# Patient Record
Sex: FEMALE | Race: OTHER | HISPANIC OR LATINO
[De-identification: names, ages, dates, MRNs, and addresses within clinical notes are randomized per-mention and may not be internally consistent; named-entity substitution may affect disease eponyms.]

---

## 2018-03-13 ENCOUNTER — RESULT REVIEW (OUTPATIENT)
Age: 33
End: 2018-03-13

## 2018-04-04 ENCOUNTER — INPATIENT (INPATIENT)
Facility: HOSPITAL | Age: 33
LOS: 2 days | Discharge: HOME | DRG: 639 | End: 2018-04-07
Attending: INTERNAL MEDICINE | Admitting: INTERNAL MEDICINE
Payer: MEDICAID

## 2018-04-04 VITALS
TEMPERATURE: 98 F | HEIGHT: 69 IN | HEART RATE: 115 BPM | SYSTOLIC BLOOD PRESSURE: 133 MMHG | WEIGHT: 95.02 LBS | RESPIRATION RATE: 16 BRPM | OXYGEN SATURATION: 98 % | DIASTOLIC BLOOD PRESSURE: 93 MMHG

## 2018-04-04 DIAGNOSIS — E10.10 TYPE 1 DIABETES MELLITUS WITH KETOACIDOSIS WITHOUT COMA: ICD-10-CM

## 2018-04-04 DIAGNOSIS — Z98.89 OTHER SPECIFIED POSTPROCEDURAL STATES: Chronic | ICD-10-CM

## 2018-04-04 DIAGNOSIS — Z90.49 ACQUIRED ABSENCE OF OTHER SPECIFIED PARTS OF DIGESTIVE TRACT: Chronic | ICD-10-CM

## 2018-04-04 DIAGNOSIS — Z98.51 TUBAL LIGATION STATUS: Chronic | ICD-10-CM

## 2018-04-04 DIAGNOSIS — Z98.49 CATARACT EXTRACTION STATUS, UNSPECIFIED EYE: Chronic | ICD-10-CM

## 2018-04-04 DIAGNOSIS — J45.909 UNSPECIFIED ASTHMA, UNCOMPLICATED: ICD-10-CM

## 2018-04-04 DIAGNOSIS — G62.9 POLYNEUROPATHY, UNSPECIFIED: ICD-10-CM

## 2018-04-04 DIAGNOSIS — Z29.9 ENCOUNTER FOR PROPHYLACTIC MEASURES, UNSPECIFIED: ICD-10-CM

## 2018-04-04 DIAGNOSIS — K52.9 NONINFECTIVE GASTROENTERITIS AND COLITIS, UNSPECIFIED: ICD-10-CM

## 2018-04-04 LAB
ACETONE SERPL-MCNC: ABNORMAL
ACETONE SERPL-MCNC: ABNORMAL
ALBUMIN SERPL ELPH-MCNC: 2.9 G/DL — LOW (ref 3.5–5)
ALBUMIN SERPL ELPH-MCNC: 4 G/DL — SIGNIFICANT CHANGE UP (ref 3.5–5)
ALP SERPL-CCNC: 109 U/L — SIGNIFICANT CHANGE UP (ref 40–120)
ALP SERPL-CCNC: 80 U/L — SIGNIFICANT CHANGE UP (ref 40–120)
ALT FLD-CCNC: 14 U/L DA — SIGNIFICANT CHANGE UP (ref 10–60)
ALT FLD-CCNC: 16 U/L DA — SIGNIFICANT CHANGE UP (ref 10–60)
ANION GAP SERPL CALC-SCNC: 10 MMOL/L — SIGNIFICANT CHANGE UP (ref 5–17)
ANION GAP SERPL CALC-SCNC: 16 MMOL/L — SIGNIFICANT CHANGE UP (ref 5–17)
ANION GAP SERPL CALC-SCNC: 23 MMOL/L — HIGH (ref 5–17)
APPEARANCE UR: CLEAR — SIGNIFICANT CHANGE UP
AST SERPL-CCNC: 10 U/L — SIGNIFICANT CHANGE UP (ref 10–40)
AST SERPL-CCNC: 12 U/L — SIGNIFICANT CHANGE UP (ref 10–40)
BASE EXCESS BLDV CALC-SCNC: -16.5 MMOL/L — LOW (ref -2–2)
BASOPHILS # BLD AUTO: 0 K/UL — SIGNIFICANT CHANGE UP (ref 0–0.2)
BASOPHILS # BLD AUTO: 0 K/UL — SIGNIFICANT CHANGE UP (ref 0–0.2)
BASOPHILS NFR BLD AUTO: 0.4 % — SIGNIFICANT CHANGE UP (ref 0–2)
BASOPHILS NFR BLD AUTO: 0.4 % — SIGNIFICANT CHANGE UP (ref 0–2)
BILIRUB SERPL-MCNC: 0.3 MG/DL — SIGNIFICANT CHANGE UP (ref 0.2–1.2)
BILIRUB SERPL-MCNC: 0.7 MG/DL — SIGNIFICANT CHANGE UP (ref 0.2–1.2)
BILIRUB UR-MCNC: NEGATIVE — SIGNIFICANT CHANGE UP
BUN SERPL-MCNC: 11 MG/DL — SIGNIFICANT CHANGE UP (ref 7–18)
BUN SERPL-MCNC: 14 MG/DL — SIGNIFICANT CHANGE UP (ref 7–18)
BUN SERPL-MCNC: 6 MG/DL — LOW (ref 7–18)
CALCIUM SERPL-MCNC: 6.1 MG/DL — CRITICAL LOW (ref 8.4–10.5)
CALCIUM SERPL-MCNC: 6.7 MG/DL — LOW (ref 8.4–10.5)
CALCIUM SERPL-MCNC: 9.1 MG/DL — SIGNIFICANT CHANGE UP (ref 8.4–10.5)
CHLORIDE SERPL-SCNC: 115 MMOL/L — HIGH (ref 96–108)
CHLORIDE SERPL-SCNC: 116 MMOL/L — HIGH (ref 96–108)
CHLORIDE SERPL-SCNC: 98 MMOL/L — SIGNIFICANT CHANGE UP (ref 96–108)
CHOLEST SERPL-MCNC: 195 MG/DL — SIGNIFICANT CHANGE UP (ref 10–199)
CO2 SERPL-SCNC: 10 MMOL/L — CRITICAL LOW (ref 22–31)
CO2 SERPL-SCNC: 10 MMOL/L — CRITICAL LOW (ref 22–31)
CO2 SERPL-SCNC: 13 MMOL/L — LOW (ref 22–31)
COLOR SPEC: YELLOW — SIGNIFICANT CHANGE UP
CREAT SERPL-MCNC: 0.7 MG/DL — SIGNIFICANT CHANGE UP (ref 0.5–1.3)
CREAT SERPL-MCNC: 0.74 MG/DL — SIGNIFICANT CHANGE UP (ref 0.5–1.3)
CREAT SERPL-MCNC: 1.17 MG/DL — SIGNIFICANT CHANGE UP (ref 0.5–1.3)
DIFF PNL FLD: NEGATIVE — SIGNIFICANT CHANGE UP
EOSINOPHIL # BLD AUTO: 0 K/UL — SIGNIFICANT CHANGE UP (ref 0–0.5)
EOSINOPHIL # BLD AUTO: 0 K/UL — SIGNIFICANT CHANGE UP (ref 0–0.5)
EOSINOPHIL NFR BLD AUTO: 0.1 % — SIGNIFICANT CHANGE UP (ref 0–6)
EOSINOPHIL NFR BLD AUTO: 0.1 % — SIGNIFICANT CHANGE UP (ref 0–6)
GLUCOSE SERPL-MCNC: 223 MG/DL — HIGH (ref 70–99)
GLUCOSE SERPL-MCNC: 262 MG/DL — HIGH (ref 70–99)
GLUCOSE SERPL-MCNC: 440 MG/DL — HIGH (ref 70–99)
GLUCOSE UR QL: 1000 MG/DL
HBA1C BLD-MCNC: 12.4 % — HIGH (ref 4–5.6)
HCG SERPL-ACNC: <1 MIU/ML — SIGNIFICANT CHANGE UP
HCO3 BLDV-SCNC: 11 MMOL/L — LOW (ref 21–29)
HCT VFR BLD CALC: 34.3 % — LOW (ref 34.5–45)
HCT VFR BLD CALC: 44.6 % — SIGNIFICANT CHANGE UP (ref 34.5–45)
HDLC SERPL-MCNC: 59 MG/DL — SIGNIFICANT CHANGE UP (ref 40–125)
HGB BLD-MCNC: 10.5 G/DL — LOW (ref 11.5–15.5)
HGB BLD-MCNC: 13.4 G/DL — SIGNIFICANT CHANGE UP (ref 11.5–15.5)
HOROWITZ INDEX BLDV+IHG-RTO: 21 — SIGNIFICANT CHANGE UP
KETONES UR-MCNC: ABNORMAL
LACTATE SERPL-SCNC: 1.9 MMOL/L — SIGNIFICANT CHANGE UP (ref 0.7–2)
LEUKOCYTE ESTERASE UR-ACNC: NEGATIVE — SIGNIFICANT CHANGE UP
LIPID PNL WITH DIRECT LDL SERPL: 64 MG/DL — SIGNIFICANT CHANGE UP
LYMPHOCYTES # BLD AUTO: 0.7 K/UL — LOW (ref 1–3.3)
LYMPHOCYTES # BLD AUTO: 1.4 K/UL — SIGNIFICANT CHANGE UP (ref 1–3.3)
LYMPHOCYTES # BLD AUTO: 14 % — SIGNIFICANT CHANGE UP (ref 13–44)
LYMPHOCYTES # BLD AUTO: 7.2 % — LOW (ref 13–44)
MAGNESIUM SERPL-MCNC: 1.4 MG/DL — LOW (ref 1.6–2.6)
MCHC RBC-ENTMCNC: 25.5 PG — LOW (ref 27–34)
MCHC RBC-ENTMCNC: 26.1 PG — LOW (ref 27–34)
MCHC RBC-ENTMCNC: 30.1 GM/DL — LOW (ref 32–36)
MCHC RBC-ENTMCNC: 30.6 GM/DL — LOW (ref 32–36)
MCV RBC AUTO: 84.7 FL — SIGNIFICANT CHANGE UP (ref 80–100)
MCV RBC AUTO: 85.2 FL — SIGNIFICANT CHANGE UP (ref 80–100)
MONOCYTES # BLD AUTO: 0.2 K/UL — SIGNIFICANT CHANGE UP (ref 0–0.9)
MONOCYTES # BLD AUTO: 0.4 K/UL — SIGNIFICANT CHANGE UP (ref 0–0.9)
MONOCYTES NFR BLD AUTO: 2.4 % — SIGNIFICANT CHANGE UP (ref 2–14)
MONOCYTES NFR BLD AUTO: 4.5 % — SIGNIFICANT CHANGE UP (ref 2–14)
NEUTROPHILS # BLD AUTO: 8 K/UL — HIGH (ref 1.8–7.4)
NEUTROPHILS # BLD AUTO: 8.1 K/UL — HIGH (ref 1.8–7.4)
NEUTROPHILS NFR BLD AUTO: 83 % — HIGH (ref 43–77)
NEUTROPHILS NFR BLD AUTO: 87.8 % — HIGH (ref 43–77)
NITRITE UR-MCNC: NEGATIVE — SIGNIFICANT CHANGE UP
OSMOLALITY SERPL: 315 MOS/KG — HIGH (ref 275–300)
PCO2 BLDV: 30 MMHG — LOW (ref 35–50)
PH BLDV: 7.18 — CRITICAL LOW (ref 7.35–7.45)
PH UR: 5 — SIGNIFICANT CHANGE UP (ref 5–8)
PHOSPHATE SERPL-MCNC: 1.5 MG/DL — LOW (ref 2.5–4.5)
PHOSPHATE SERPL-MCNC: 2.2 MG/DL — LOW (ref 2.5–4.5)
PLATELET # BLD AUTO: 348 K/UL — SIGNIFICANT CHANGE UP (ref 150–400)
PLATELET # BLD AUTO: 495 K/UL — HIGH (ref 150–400)
PO2 BLDV: SIGNIFICANT CHANGE UP MMHG (ref 25–45)
POTASSIUM SERPL-MCNC: 3.6 MMOL/L — SIGNIFICANT CHANGE UP (ref 3.5–5.3)
POTASSIUM SERPL-MCNC: 4.5 MMOL/L — SIGNIFICANT CHANGE UP (ref 3.5–5.3)
POTASSIUM SERPL-MCNC: 4.6 MMOL/L — SIGNIFICANT CHANGE UP (ref 3.5–5.3)
POTASSIUM SERPL-SCNC: 3.6 MMOL/L — SIGNIFICANT CHANGE UP (ref 3.5–5.3)
POTASSIUM SERPL-SCNC: 4.5 MMOL/L — SIGNIFICANT CHANGE UP (ref 3.5–5.3)
POTASSIUM SERPL-SCNC: 4.6 MMOL/L — SIGNIFICANT CHANGE UP (ref 3.5–5.3)
PROT SERPL-MCNC: 6.7 G/DL — SIGNIFICANT CHANGE UP (ref 6–8.3)
PROT SERPL-MCNC: 9.3 G/DL — HIGH (ref 6–8.3)
PROT UR-MCNC: 30 MG/DL
RBC # BLD: 4.02 M/UL — SIGNIFICANT CHANGE UP (ref 3.8–5.2)
RBC # BLD: 5.26 M/UL — HIGH (ref 3.8–5.2)
RBC # FLD: 13.8 % — SIGNIFICANT CHANGE UP (ref 10.3–14.5)
RBC # FLD: 13.9 % — SIGNIFICANT CHANGE UP (ref 10.3–14.5)
SAO2 % BLDV: 55 % — LOW (ref 67–88)
SODIUM SERPL-SCNC: 131 MMOL/L — LOW (ref 135–145)
SODIUM SERPL-SCNC: 139 MMOL/L — SIGNIFICANT CHANGE UP (ref 135–145)
SODIUM SERPL-SCNC: 141 MMOL/L — SIGNIFICANT CHANGE UP (ref 135–145)
SP GR SPEC: 1.02 — SIGNIFICANT CHANGE UP (ref 1.01–1.02)
TOTAL CHOLESTEROL/HDL RATIO MEASUREMENT: 3.3 RATIO — SIGNIFICANT CHANGE UP (ref 3.3–7.1)
TRIGL SERPL-MCNC: 358 MG/DL — HIGH (ref 10–149)
TROPONIN I SERPL-MCNC: <0.015 NG/ML — SIGNIFICANT CHANGE UP (ref 0–0.04)
TSH SERPL-MCNC: 2.01 UU/ML — SIGNIFICANT CHANGE UP (ref 0.34–4.82)
UROBILINOGEN FLD QL: NEGATIVE — SIGNIFICANT CHANGE UP
WBC # BLD: 9.2 K/UL — SIGNIFICANT CHANGE UP (ref 3.8–10.5)
WBC # BLD: 9.7 K/UL — SIGNIFICANT CHANGE UP (ref 3.8–10.5)
WBC # FLD AUTO: 9.2 K/UL — SIGNIFICANT CHANGE UP (ref 3.8–10.5)
WBC # FLD AUTO: 9.7 K/UL — SIGNIFICANT CHANGE UP (ref 3.8–10.5)

## 2018-04-04 PROCEDURE — 99285 EMERGENCY DEPT VISIT HI MDM: CPT

## 2018-04-04 PROCEDURE — 71045 X-RAY EXAM CHEST 1 VIEW: CPT | Mod: 26

## 2018-04-04 RX ORDER — POTASSIUM CHLORIDE 20 MEQ
10 PACKET (EA) ORAL
Qty: 0 | Refills: 0 | Status: COMPLETED | OUTPATIENT
Start: 2018-04-04 | End: 2018-04-05

## 2018-04-04 RX ORDER — CHLORHEXIDINE GLUCONATE 213 G/1000ML
1 SOLUTION TOPICAL
Qty: 0 | Refills: 0 | Status: DISCONTINUED | OUTPATIENT
Start: 2018-04-04 | End: 2018-04-05

## 2018-04-04 RX ORDER — GABAPENTIN 400 MG/1
1 CAPSULE ORAL
Qty: 0 | Refills: 0 | COMMUNITY

## 2018-04-04 RX ORDER — INSULIN HUMAN 100 [IU]/ML
4 INJECTION, SOLUTION SUBCUTANEOUS
Qty: 50 | Refills: 0 | Status: DISCONTINUED | OUTPATIENT
Start: 2018-04-04 | End: 2018-04-04

## 2018-04-04 RX ORDER — INSULIN HUMAN 100 [IU]/ML
4 INJECTION, SOLUTION SUBCUTANEOUS
Qty: 100 | Refills: 0 | Status: DISCONTINUED | OUTPATIENT
Start: 2018-04-04 | End: 2018-04-05

## 2018-04-04 RX ORDER — ACETAMINOPHEN 500 MG
650 TABLET ORAL EVERY 6 HOURS
Qty: 0 | Refills: 0 | Status: DISCONTINUED | OUTPATIENT
Start: 2018-04-04 | End: 2018-04-07

## 2018-04-04 RX ORDER — PANTOPRAZOLE SODIUM 20 MG/1
40 TABLET, DELAYED RELEASE ORAL DAILY
Qty: 0 | Refills: 0 | Status: DISCONTINUED | OUTPATIENT
Start: 2018-04-04 | End: 2018-04-05

## 2018-04-04 RX ORDER — SODIUM CHLORIDE 9 MG/ML
1000 INJECTION INTRAMUSCULAR; INTRAVENOUS; SUBCUTANEOUS
Qty: 0 | Refills: 0 | Status: DISCONTINUED | OUTPATIENT
Start: 2018-04-04 | End: 2018-04-04

## 2018-04-04 RX ORDER — ATORVASTATIN CALCIUM 80 MG/1
1 TABLET, FILM COATED ORAL
Qty: 0 | Refills: 0 | COMMUNITY

## 2018-04-04 RX ORDER — SODIUM CHLORIDE 9 MG/ML
1000 INJECTION, SOLUTION INTRAVENOUS
Qty: 0 | Refills: 0 | Status: DISCONTINUED | OUTPATIENT
Start: 2018-04-04 | End: 2018-04-05

## 2018-04-04 RX ORDER — SODIUM CHLORIDE 9 MG/ML
1000 INJECTION INTRAMUSCULAR; INTRAVENOUS; SUBCUTANEOUS ONCE
Qty: 0 | Refills: 0 | Status: COMPLETED | OUTPATIENT
Start: 2018-04-04 | End: 2018-04-04

## 2018-04-04 RX ORDER — ONDANSETRON 8 MG/1
4 TABLET, FILM COATED ORAL ONCE
Qty: 0 | Refills: 0 | Status: COMPLETED | OUTPATIENT
Start: 2018-04-04 | End: 2018-04-04

## 2018-04-04 RX ORDER — ONDANSETRON 8 MG/1
4 TABLET, FILM COATED ORAL EVERY 8 HOURS
Qty: 0 | Refills: 0 | Status: DISCONTINUED | OUTPATIENT
Start: 2018-04-04 | End: 2018-04-07

## 2018-04-04 RX ORDER — POTASSIUM PHOSPHATE, MONOBASIC POTASSIUM PHOSPHATE, DIBASIC 236; 224 MG/ML; MG/ML
30 INJECTION, SOLUTION INTRAVENOUS ONCE
Qty: 0 | Refills: 0 | Status: COMPLETED | OUTPATIENT
Start: 2018-04-04 | End: 2018-04-04

## 2018-04-04 RX ORDER — CALCIUM GLUCONATE 100 MG/ML
2 VIAL (ML) INTRAVENOUS ONCE
Qty: 0 | Refills: 0 | Status: COMPLETED | OUTPATIENT
Start: 2018-04-04 | End: 2018-04-04

## 2018-04-04 RX ORDER — INSULIN HUMAN 100 [IU]/ML
4 INJECTION, SOLUTION SUBCUTANEOUS ONCE
Qty: 0 | Refills: 0 | Status: COMPLETED | OUTPATIENT
Start: 2018-04-04 | End: 2018-04-04

## 2018-04-04 RX ORDER — ENOXAPARIN SODIUM 100 MG/ML
40 INJECTION SUBCUTANEOUS DAILY
Qty: 0 | Refills: 0 | Status: DISCONTINUED | OUTPATIENT
Start: 2018-04-04 | End: 2018-04-07

## 2018-04-04 RX ADMIN — CHLORHEXIDINE GLUCONATE 1 APPLICATION(S): 213 SOLUTION TOPICAL at 17:48

## 2018-04-04 RX ADMIN — Medication 200 GRAM(S): at 19:00

## 2018-04-04 RX ADMIN — SODIUM CHLORIDE 4000 MILLILITER(S): 9 INJECTION INTRAMUSCULAR; INTRAVENOUS; SUBCUTANEOUS at 12:32

## 2018-04-04 RX ADMIN — INSULIN HUMAN 4 UNIT(S)/HR: 100 INJECTION, SOLUTION SUBCUTANEOUS at 18:22

## 2018-04-04 RX ADMIN — PANTOPRAZOLE SODIUM 40 MILLIGRAM(S): 20 TABLET, DELAYED RELEASE ORAL at 20:58

## 2018-04-04 RX ADMIN — INSULIN HUMAN 4 UNIT(S)/HR: 100 INJECTION, SOLUTION SUBCUTANEOUS at 23:08

## 2018-04-04 RX ADMIN — INSULIN HUMAN 4 UNIT(S): 100 INJECTION, SOLUTION SUBCUTANEOUS at 14:13

## 2018-04-04 RX ADMIN — SODIUM CHLORIDE 200 MILLILITER(S): 9 INJECTION INTRAMUSCULAR; INTRAVENOUS; SUBCUTANEOUS at 14:49

## 2018-04-04 RX ADMIN — ENOXAPARIN SODIUM 40 MILLIGRAM(S): 100 INJECTION SUBCUTANEOUS at 20:58

## 2018-04-04 RX ADMIN — SODIUM CHLORIDE 4000 MILLILITER(S): 9 INJECTION INTRAMUSCULAR; INTRAVENOUS; SUBCUTANEOUS at 12:59

## 2018-04-04 RX ADMIN — SODIUM CHLORIDE 4000 MILLILITER(S): 9 INJECTION INTRAMUSCULAR; INTRAVENOUS; SUBCUTANEOUS at 17:24

## 2018-04-04 RX ADMIN — INSULIN HUMAN 4 UNIT(S)/HR: 100 INJECTION, SOLUTION SUBCUTANEOUS at 14:41

## 2018-04-04 RX ADMIN — Medication 100 MILLIEQUIVALENT(S): at 23:40

## 2018-04-04 RX ADMIN — SODIUM CHLORIDE 200 MILLILITER(S): 9 INJECTION, SOLUTION INTRAVENOUS at 17:47

## 2018-04-04 RX ADMIN — ONDANSETRON 4 MILLIGRAM(S): 8 TABLET, FILM COATED ORAL at 14:13

## 2018-04-04 RX ADMIN — POTASSIUM PHOSPHATE, MONOBASIC POTASSIUM PHOSPHATE, DIBASIC 85 MILLIMOLE(S): 236; 224 INJECTION, SOLUTION INTRAVENOUS at 22:41

## 2018-04-04 RX ADMIN — SODIUM CHLORIDE 150 MILLILITER(S): 9 INJECTION INTRAMUSCULAR; INTRAVENOUS; SUBCUTANEOUS at 12:59

## 2018-04-04 NOTE — H&P ADULT - PROBLEM SELECTOR PLAN 1
pt was admitted due to DKA AG 23    pt reported 3 days of N/V and diarrhea   pt takes Tresiba 90U daily and HSS at home, she reported not taking medications over the past 2 days and tool 25U of short acting insulin  pt was started on insulin drip at 4 cc/hr  will transfer pt to the ICU pt was admitted due to DKA AG 23    pt reported 3 days of N/V and diarrhea   pt takes Tresiba 90U daily and HSS at home, she reported not taking medications over the past 2 days and tool 25U of short acting insulin  pt was started on insulin drip at 4 cc/hr  will transfer pt to the ICU  monitor labs q/4 hrs  f/u HbA1C level

## 2018-04-04 NOTE — ED PROVIDER NOTE - PMH
Asthma, unspecified asthma severity, unspecified whether complicated, unspecified whether persistent    Depression, unspecified depression type    Diabetes mellitus type 1    GERD (gastroesophageal reflux disease)    HSV-1 infection    HSV-2 infection    Insulin dependent diabetes mellitus    Neuropathy

## 2018-04-04 NOTE — ED PROVIDER NOTE - PROGRESS NOTE DETAILS
Pt with evidence of DKA.  IVF in progress and regular insulin bolus and infusion ordered.    Dr. Mcmullen accepts Pt for ICU admission and he will inform ICU resident.

## 2018-04-04 NOTE — H&P ADULT - NEGATIVE MUSCULOSKELETAL SYMPTOMS
no arthralgia/no muscle weakness/no arthritis/no neck pain/no myalgia/no stiffness/no muscle cramps/no joint swelling

## 2018-04-04 NOTE — H&P ADULT - NEGATIVE SKIN SYMPTOMS
no itching/no brittle nails/no dryness/no change in size/color of mole/no tumor/no pitted nails/no rash/no hair loss

## 2018-04-04 NOTE — H&P ADULT - NEGATIVE ENMT SYMPTOMS
no hearing difficulty/no nasal congestion/no nasal obstruction/no ear pain/no tinnitus/no post-nasal discharge/no nose bleeds/no vertigo/no sinus symptoms/no recurrent cold sores/no nasal discharge/no abnormal taste sensation

## 2018-04-04 NOTE — ED PROVIDER NOTE - OBJECTIVE STATEMENT
31yo female from home, lives with 2 children, PMH Type I DM (x 14 years), asthma, HLD, IBS, gastritis, depression, s/p , tubal ligation, cholecystectomy, bilateral cataracts, BIBA for 3 days of vomiting, diarrhea, abdominal pain.  Patient is somnolent but able to provide some history; additional medical history obtained from her PMD, Dr Salvatore Louis, [phone].  Endorses 3 days of chills, NBNB vomiting, nonbloody diarrhea and generalized abdominal pain x 3 days.  Patient usually takes long acting insulin Tresiba 90U daily and sliding scale humalog but has not taken Tresiba x past 2 days.  Patient saw PMD yesterday, who told her to come to ED.  Currently, also endorses chest pain and SOB. Denies fever, headache, change in vision, sore throat, cough, dysuria, hematuria, back pain, vaginal discharge.  Last sexual encounter unprotected 3 days ago, smoked marijuana 3 days ago. 33yo female from home, lives with 2 children, PMH Type I DM (x 14 years), asthma, HLD, IBS, gastritis, depression, s/p , tubal ligation, cholecystectomy, bilateral cataracts, BIBA for 3 days of vomiting, diarrhea, abdominal pain.  Patient is somnolent but able to provide some history; additional medical history obtained from her PMD, Dr Salvatore Louis, [phone].  Endorses 3 days of chills, NBNB vomiting, nonbloody diarrhea and generalized abdominal pain x 3 days.  Patient usually takes long acting insulin Tresiba 90U daily and sliding scale humalog but has not taken Tresiba x past 2 days, states took 25U short acting insulin yesterday.  Patient saw PMD yesterday, who told her to come to ED.  Currently, also endorses chest pain and SOB. Denies fever, headache, change in vision, sore throat, cough, dysuria, hematuria, back pain, vaginal discharge.  Last sexual encounter unprotected 3 days ago, smoked marijuana 3 days ago. 33yo female from home, lives with 2 children, PMH Type I DM (x 14 years), asthma, HLD, IBS, gastritis, depression, s/p , tubal ligation, cholecystectomy, bilateral cataracts, BIBA for 3 days of vomiting, diarrhea, abdominal pain.  Patient is somnolent but able to provide some history; additional medical history obtained from her PMD, Dr Salvatore Louis, [phone].  Endorses 3 days of chills, NBNB vomiting, nonbloody diarrhea and generalized abdominal pain x 3 days.  Patient usually takes long acting insulin Tresiba 90U daily and sliding scale humalog but has not taken Tresiba x past 2 days, states took 25U short acting insulin yesterday.  Patient saw PMD yesterday, who told her to come to ED.  Currently, also endorses chest pain and SOB. Denies fever, headache, change in vision, sore throat, cough, dysuria, hematuria, back pain, vaginal discharge. Denies sick contacts at home.  Last sexual encounter unprotected 3 days ago, smoked marijuana 3 days ago.

## 2018-04-04 NOTE — H&P ADULT - RS GEN PE MLT RESP DETAILS PC
clear to auscultation bilaterally/no chest wall tenderness/no rhonchi/no wheezes/no rales/respirations non-labored/good air movement/no subcutaneous emphysema/normal/breath sounds equal/airway patent/no intercostal retractions

## 2018-04-04 NOTE — ED PROVIDER NOTE - PSH
H/O colonoscopy    H/O tubal ligation    History of     Insulin pump in place    S/P cataract surgery    S/P cholecystectomy    S/P endoscopy

## 2018-04-04 NOTE — H&P ADULT - ASSESSMENT
31 yo female from home lives with 2 children. PMH Type 1 DM (for the past 14 years), asthma, HLD, IBS, gastritis, depression, s/p , tubal ligation, cholecystectomy, acne (on abx) and bilateral cataracts. Pt was  BIBA for 3 days of vomiting, diarrhea, abdominal pain.  Patient is somnolent but able to provide some history; additional medical history obtained from her PMD, Dr Salvatore Louis, [phone].  Endorses 3 days of chills, NBNB vomiting, nonbloody diarrhea and generalized abdominal pain x 3 days.  Patient usually takes long acting insulin Tresiba 90U daily and sliding scale Humalog but has not taken Tresiba x past 2 days, states took 25U short acting insulin yesterday.  Patient saw PMD yesterday, who told her to come to ED.  Currently, also endorses chest pain and SOB.

## 2018-04-04 NOTE — H&P ADULT - NEGATIVE CARDIOVASCULAR SYMPTOMS
no palpitations/no dyspnea on exertion/no peripheral edema/no orthopnea/no paroxysmal nocturnal dyspnea/no claudication/no chest pain

## 2018-04-04 NOTE — H&P ADULT - GASTROINTESTINAL DETAILS
no rigidity/no distention/no masses palpable/bowel sounds normal/no bruit/no rebound tenderness/soft/nontender/no guarding/no organomegaly/normal

## 2018-04-04 NOTE — ED ADULT NURSE NOTE - OBJECTIVE STATEMENT
pt is a 31 y/o female with c/o abdominal pain with N/V /Diarrhea x 2 days . pt abdomen soft non distended + BS noted. feeling nauseaus .

## 2018-04-04 NOTE — ED PROVIDER NOTE - MEDICAL DECISION MAKING DETAILS
Pt with DM type 1, vomiting and diarrhea without fever, FS BG over 400 on arrival--will check labs, UA, CXR, EKG, give IVF, concern for DKA.

## 2018-04-04 NOTE — ED ADULT TRIAGE NOTE - CHIEF COMPLAINT QUOTE
as per the pt " I had meat and salmon started getting diarrhoea  vomiting for 2 days " pt had h/o DM . LMP 4/21

## 2018-04-04 NOTE — H&P ADULT - NEGATIVE NEUROLOGICAL SYMPTOMS
no weakness/no paresthesias/no vertigo/no loss of sensation/no headache/no hemiparesis/no difficulty walking/no focal seizures/no syncope/no loss of consciousness/no confusion/no generalized seizures/no transient paralysis/no facial palsy/no tremors

## 2018-04-04 NOTE — H&P ADULT - ATTENDING COMMENTS
32 year old female with DM1, asthma, IBS, H/o Gastritis, depresion who p/w abd pain, n/v. noted to be in DKA. she has not been taking insulin for past couple days due to feeling ill.  no s&s of infection noted.   + social issues  and stresses at this time.    Assessment  DKA  dm 1 with neuropathy  complex social issues    Plan  Admit to ICU  IVF  Insulin drip  check a1c  endo eval  monitor and supplement electrolyte

## 2018-04-04 NOTE — H&P ADULT - NEUROLOGICAL DETAILS
superficial reflexes intact/alert and oriented x 3/responds to verbal commands/responds to pain/sensation intact/deep reflexes intact/cranial nerves intact/no spontaneous movement

## 2018-04-04 NOTE — H&P ADULT - HISTORY OF PRESENT ILLNESS
33 yo female from home lives with 2 children. PMH Type 1 DM (for the past 14 years), asthma, HLD, IBS, gastritis, depression, s/p , tubal ligation, cholecystectomy, acne (on abx) and bilateral cataracts. Pt was  BIBA for 3 days of vomiting, diarrhea, abdominal pain.  Patient is somnolent but able to provide some history; additional medical history obtained from her PMD, Dr Salvatore Louis, [phone].  Endorses 3 days of chills, NBNB vomiting, nonbloody diarrhea and generalized abdominal pain x 3 days.  Patient usually takes long acting insulin Tresiba 90U daily and sliding scale Humalog but has not taken Tresiba x past 2 days, states took 25U short acting insulin yesterday.  Patient saw PMD yesterday, who told her to come to ED.  Currently, also endorses chest pain and SOB.     Denies fever, headache, change in vision, sore throat, cough, dysuria, hematuria, back pain, vaginal discharge. Denies sick contacts at home.  Last sexual encounter unprotected 3 days ago, smoked marijuana 3 days ago. 33 yo female from home lives with 2 children. PMH Type 1 DM (for the past 14 years), asthma, HLD, IBS, gastritis, depression, s/p , tubal ligation, cholecystectomy, acne (on abx) and bilateral cataracts. Pt was BIBA for 3 days of vomiting, diarrhea, abdominal pain.  Patient is somnolent but able to provide some history; additional medical history obtained from her PMD, Dr Salvatore Louis, [phone].  Endorses 3 days of chills, NBNB vomiting, nonbloody diarrhea and generalized abdominal pain x 3 days. Patient usually takes long acting insulin Tresiba 90U daily and sliding scale Humalog but has not taken Tresiba x past 2 days, states took 25U short acting insulin yesterday. Patient saw PMD yesterday, who told her to come to ED. Currently, also endorses chest pain from vomiting and SOB.     Denies fever, headache, change in vision, sore throat, cough, dysuria, hematuria, back pain, vaginal discharge. Denies sick contacts at home.  Last sexual encounter unprotected 3 days ago, smoked marijuana 3 days ago. 31 yo female from home lives with 2 children. PMH Type 1 DM (for the past 14 years), asthma, HLD, IBS, gastritis, depression, s/p , tubal ligation, cholecystectomy, acne (on abx) and bilateral cataracts. Pt was BIBA for 3 days of vomiting, diarrhea, abdominal pain.  Patient is somnolent but able to provide some history; additional medical history obtained from her PMD Dr. Louis. Endorses 3 days of chills, NBNB vomiting, nonbloody diarrhea and generalized abdominal pain x 3 days. Patient usually takes long acting insulin Tresiba 90U daily and sliding scale Humalog but has not taken Tresiba x past 2 days, states took 25U short acting insulin yesterday. Patient saw PMD yesterday, who told her to come to ED. Currently, also endorses chest pain from vomiting and SOB.     Denies fever, headache, change in vision, sore throat, cough, dysuria, hematuria, back pain, vaginal discharge. Denies sick contacts at home.  Last sexual encounter unprotected 3 days ago, smoked marijuana 3 days ago.

## 2018-04-04 NOTE — ED ADULT NURSE NOTE - PMH
Diabetes mellitus type 1    GERD (gastroesophageal reflux disease)    HSV-1 infection    HSV-2 infection    Insulin dependent diabetes mellitus    Neuropathy

## 2018-04-04 NOTE — H&P ADULT - PROBLEM SELECTOR PLAN 2
pt presented with nausea, vomiting, diarrhea and chills over the past 3 days  No WBC or fever   c/w IVF   c/w Zofran PRN   f/u stool analysis

## 2018-04-04 NOTE — H&P ADULT - NEGATIVE GASTROINTESTINAL SYMPTOMS
no jaundice/no change in bowel habits/no flatulence/no constipation/no steatorrhea/no melena/no hematochezia/no hiccoughs

## 2018-04-04 NOTE — ED PROVIDER NOTE - ATTENDING CONTRIBUTION TO CARE
Pt with h/o DM type 1 on insulin, c/o vomiting, diarrhea, abdominal discomfort, found to be in DKA, normal mental status on my exam--will admit to ICU on insulin drip.

## 2018-04-04 NOTE — ED PROVIDER NOTE - CARE PLAN
Principal Discharge DX:	Diabetic ketoacidosis without coma associated with type 1 diabetes mellitus  Secondary Diagnosis:	Gastroenteritis

## 2018-04-05 LAB
ACETONE SERPL-MCNC: NEGATIVE — SIGNIFICANT CHANGE UP
ANION GAP SERPL CALC-SCNC: 10 MMOL/L — SIGNIFICANT CHANGE UP (ref 5–17)
ANION GAP SERPL CALC-SCNC: 8 MMOL/L — SIGNIFICANT CHANGE UP (ref 5–17)
ANION GAP SERPL CALC-SCNC: 8 MMOL/L — SIGNIFICANT CHANGE UP (ref 5–17)
ANION GAP SERPL CALC-SCNC: 9 MMOL/L — SIGNIFICANT CHANGE UP (ref 5–17)
B-OH-BUTYR SERPL-SCNC: 9 MMOL/L — HIGH
BASE EXCESS BLDV CALC-SCNC: -11 MMOL/L — LOW (ref -2–2)
BASE EXCESS BLDV CALC-SCNC: -11 MMOL/L — LOW (ref -2–2)
BASE EXCESS BLDV CALC-SCNC: -12.4 MMOL/L — LOW (ref -2–2)
BLOOD GAS COMMENTS, VENOUS: SIGNIFICANT CHANGE UP
BLOOD GAS COMMENTS, VENOUS: SIGNIFICANT CHANGE UP
BUN SERPL-MCNC: 2 MG/DL — LOW (ref 7–18)
BUN SERPL-MCNC: 2 MG/DL — LOW (ref 7–18)
BUN SERPL-MCNC: 3 MG/DL — LOW (ref 7–18)
BUN SERPL-MCNC: 4 MG/DL — LOW (ref 7–18)
CALCIUM SERPL-MCNC: 6.4 MG/DL — CRITICAL LOW (ref 8.4–10.5)
CALCIUM SERPL-MCNC: 6.5 MG/DL — CRITICAL LOW (ref 8.4–10.5)
CALCIUM SERPL-MCNC: 6.7 MG/DL — LOW (ref 8.4–10.5)
CALCIUM SERPL-MCNC: 7.2 MG/DL — LOW (ref 8.4–10.5)
CHLORIDE SERPL-SCNC: 115 MMOL/L — HIGH (ref 96–108)
CHLORIDE SERPL-SCNC: 116 MMOL/L — HIGH (ref 96–108)
CHLORIDE SERPL-SCNC: 118 MMOL/L — HIGH (ref 96–108)
CHLORIDE SERPL-SCNC: 119 MMOL/L — HIGH (ref 96–108)
CO2 SERPL-SCNC: 14 MMOL/L — LOW (ref 22–31)
CO2 SERPL-SCNC: 14 MMOL/L — LOW (ref 22–31)
CO2 SERPL-SCNC: 16 MMOL/L — LOW (ref 22–31)
CO2 SERPL-SCNC: 17 MMOL/L — LOW (ref 22–31)
CREAT SERPL-MCNC: 0.51 MG/DL — SIGNIFICANT CHANGE UP (ref 0.5–1.3)
CREAT SERPL-MCNC: 0.56 MG/DL — SIGNIFICANT CHANGE UP (ref 0.5–1.3)
CREAT SERPL-MCNC: 0.63 MG/DL — SIGNIFICANT CHANGE UP (ref 0.5–1.3)
CREAT SERPL-MCNC: 0.74 MG/DL — SIGNIFICANT CHANGE UP (ref 0.5–1.3)
GLUCOSE SERPL-MCNC: 111 MG/DL — HIGH (ref 70–99)
GLUCOSE SERPL-MCNC: 157 MG/DL — HIGH (ref 70–99)
GLUCOSE SERPL-MCNC: 162 MG/DL — HIGH (ref 70–99)
GLUCOSE SERPL-MCNC: 221 MG/DL — HIGH (ref 70–99)
HCO3 BLDV-SCNC: 13 MMOL/L — LOW (ref 21–29)
HCO3 BLDV-SCNC: 14 MMOL/L — LOW (ref 21–29)
HCO3 BLDV-SCNC: 15 MMOL/L — LOW (ref 21–29)
HCT VFR BLD CALC: 29.8 % — LOW (ref 34.5–45)
HGB BLD-MCNC: 9.1 G/DL — LOW (ref 11.5–15.5)
HOROWITZ INDEX BLDV+IHG-RTO: 21 — SIGNIFICANT CHANGE UP
MAGNESIUM SERPL-MCNC: 1.3 MG/DL — LOW (ref 1.6–2.6)
MAGNESIUM SERPL-MCNC: 1.9 MG/DL — SIGNIFICANT CHANGE UP (ref 1.6–2.6)
MAGNESIUM SERPL-MCNC: 2.3 MG/DL — SIGNIFICANT CHANGE UP (ref 1.6–2.6)
MCHC RBC-ENTMCNC: 25.6 PG — LOW (ref 27–34)
MCHC RBC-ENTMCNC: 30.5 GM/DL — LOW (ref 32–36)
MCV RBC AUTO: 84 FL — SIGNIFICANT CHANGE UP (ref 80–100)
PCO2 BLDV: 29 MMHG — LOW (ref 35–50)
PCO2 BLDV: 32 MMHG — LOW (ref 35–50)
PCO2 BLDV: 33 MMHG — LOW (ref 35–50)
PH BLDV: 7.27 — LOW (ref 7.35–7.45)
PH BLDV: 7.28 — LOW (ref 7.35–7.45)
PH BLDV: 7.28 — LOW (ref 7.35–7.45)
PHOSPHATE SERPL-MCNC: 1.7 MG/DL — LOW (ref 2.5–4.5)
PHOSPHATE SERPL-MCNC: 2.3 MG/DL — LOW (ref 2.5–4.5)
PHOSPHATE SERPL-MCNC: 3.1 MG/DL — SIGNIFICANT CHANGE UP (ref 2.5–4.5)
PLATELET # BLD AUTO: 312 K/UL — SIGNIFICANT CHANGE UP (ref 150–400)
PO2 BLDV: 52 MMHG — HIGH (ref 25–45)
PO2 BLDV: 57 MMHG — HIGH (ref 25–45)
PO2 BLDV: <43 MMHG — SIGNIFICANT CHANGE UP (ref 25–45)
POTASSIUM SERPL-MCNC: 3.5 MMOL/L — SIGNIFICANT CHANGE UP (ref 3.5–5.3)
POTASSIUM SERPL-MCNC: 3.6 MMOL/L — SIGNIFICANT CHANGE UP (ref 3.5–5.3)
POTASSIUM SERPL-MCNC: 3.8 MMOL/L — SIGNIFICANT CHANGE UP (ref 3.5–5.3)
POTASSIUM SERPL-MCNC: 4.1 MMOL/L — SIGNIFICANT CHANGE UP (ref 3.5–5.3)
POTASSIUM SERPL-SCNC: 3.5 MMOL/L — SIGNIFICANT CHANGE UP (ref 3.5–5.3)
POTASSIUM SERPL-SCNC: 3.6 MMOL/L — SIGNIFICANT CHANGE UP (ref 3.5–5.3)
POTASSIUM SERPL-SCNC: 3.8 MMOL/L — SIGNIFICANT CHANGE UP (ref 3.5–5.3)
POTASSIUM SERPL-SCNC: 4.1 MMOL/L — SIGNIFICANT CHANGE UP (ref 3.5–5.3)
RBC # BLD: 3.55 M/UL — LOW (ref 3.8–5.2)
RBC # FLD: 13.9 % — SIGNIFICANT CHANGE UP (ref 10.3–14.5)
SAO2 % BLDV: 65 % — LOW (ref 67–88)
SAO2 % BLDV: 85 % — SIGNIFICANT CHANGE UP (ref 67–88)
SAO2 % BLDV: 89 % — HIGH (ref 67–88)
SODIUM SERPL-SCNC: 140 MMOL/L — SIGNIFICANT CHANGE UP (ref 135–145)
SODIUM SERPL-SCNC: 141 MMOL/L — SIGNIFICANT CHANGE UP (ref 135–145)
SODIUM SERPL-SCNC: 141 MMOL/L — SIGNIFICANT CHANGE UP (ref 135–145)
SODIUM SERPL-SCNC: 142 MMOL/L — SIGNIFICANT CHANGE UP (ref 135–145)
WBC # BLD: 11.6 K/UL — HIGH (ref 3.8–10.5)
WBC # FLD AUTO: 11.6 K/UL — HIGH (ref 3.8–10.5)

## 2018-04-05 RX ORDER — GABAPENTIN 400 MG/1
100 CAPSULE ORAL THREE TIMES A DAY
Qty: 0 | Refills: 0 | Status: DISCONTINUED | OUTPATIENT
Start: 2018-04-05 | End: 2018-04-07

## 2018-04-05 RX ORDER — SODIUM CHLORIDE 9 MG/ML
1000 INJECTION INTRAMUSCULAR; INTRAVENOUS; SUBCUTANEOUS
Qty: 0 | Refills: 0 | Status: DISCONTINUED | OUTPATIENT
Start: 2018-04-05 | End: 2018-04-05

## 2018-04-05 RX ORDER — INSULIN GLARGINE 100 [IU]/ML
30 INJECTION, SOLUTION SUBCUTANEOUS AT BEDTIME
Qty: 0 | Refills: 0 | Status: DISCONTINUED | OUTPATIENT
Start: 2018-04-06 | End: 2018-04-06

## 2018-04-05 RX ORDER — CALCIUM GLUCONATE 100 MG/ML
1 VIAL (ML) INTRAVENOUS ONCE
Qty: 0 | Refills: 0 | Status: COMPLETED | OUTPATIENT
Start: 2018-04-05 | End: 2018-04-05

## 2018-04-05 RX ORDER — POTASSIUM PHOSPHATE, MONOBASIC POTASSIUM PHOSPHATE, DIBASIC 236; 224 MG/ML; MG/ML
30 INJECTION, SOLUTION INTRAVENOUS ONCE
Qty: 0 | Refills: 0 | Status: COMPLETED | OUTPATIENT
Start: 2018-04-05 | End: 2018-04-05

## 2018-04-05 RX ORDER — SODIUM CHLORIDE 9 MG/ML
500 INJECTION INTRAMUSCULAR; INTRAVENOUS; SUBCUTANEOUS ONCE
Qty: 0 | Refills: 0 | Status: COMPLETED | OUTPATIENT
Start: 2018-04-05 | End: 2018-04-05

## 2018-04-05 RX ORDER — CALCIUM GLUCONATE 100 MG/ML
1 VIAL (ML) INTRAVENOUS
Qty: 0 | Refills: 0 | Status: COMPLETED | OUTPATIENT
Start: 2018-04-05 | End: 2018-04-05

## 2018-04-05 RX ORDER — INSULIN GLARGINE 100 [IU]/ML
30 INJECTION, SOLUTION SUBCUTANEOUS ONCE
Qty: 0 | Refills: 0 | Status: COMPLETED | OUTPATIENT
Start: 2018-04-05 | End: 2018-04-05

## 2018-04-05 RX ORDER — INSULIN LISPRO 100/ML
VIAL (ML) SUBCUTANEOUS AT BEDTIME
Qty: 0 | Refills: 0 | Status: DISCONTINUED | OUTPATIENT
Start: 2018-04-05 | End: 2018-04-07

## 2018-04-05 RX ORDER — MAGNESIUM SULFATE 500 MG/ML
1 VIAL (ML) INJECTION
Qty: 0 | Refills: 0 | Status: COMPLETED | OUTPATIENT
Start: 2018-04-05 | End: 2018-04-05

## 2018-04-05 RX ORDER — INSULIN LISPRO 100/ML
VIAL (ML) SUBCUTANEOUS
Qty: 0 | Refills: 0 | Status: DISCONTINUED | OUTPATIENT
Start: 2018-04-05 | End: 2018-04-07

## 2018-04-05 RX ORDER — POTASSIUM PHOSPHATE, MONOBASIC POTASSIUM PHOSPHATE, DIBASIC 236; 224 MG/ML; MG/ML
15 INJECTION, SOLUTION INTRAVENOUS ONCE
Qty: 0 | Refills: 0 | Status: DISCONTINUED | OUTPATIENT
Start: 2018-04-05 | End: 2018-04-05

## 2018-04-05 RX ORDER — POTASSIUM PHOSPHATE, MONOBASIC POTASSIUM PHOSPHATE, DIBASIC 236; 224 MG/ML; MG/ML
15 INJECTION, SOLUTION INTRAVENOUS ONCE
Qty: 0 | Refills: 0 | Status: COMPLETED | OUTPATIENT
Start: 2018-04-05 | End: 2018-04-05

## 2018-04-05 RX ORDER — CALCIUM GLUCONATE 100 MG/ML
1 VIAL (ML) INTRAVENOUS
Qty: 0 | Refills: 0 | Status: DISCONTINUED | OUTPATIENT
Start: 2018-04-05 | End: 2018-04-05

## 2018-04-05 RX ORDER — INSULIN LISPRO 100/ML
4 VIAL (ML) SUBCUTANEOUS
Qty: 0 | Refills: 0 | Status: DISCONTINUED | OUTPATIENT
Start: 2018-04-05 | End: 2018-04-07

## 2018-04-05 RX ADMIN — Medication 200 GRAM(S): at 14:27

## 2018-04-05 RX ADMIN — SODIUM CHLORIDE 100 MILLILITER(S): 9 INJECTION INTRAMUSCULAR; INTRAVENOUS; SUBCUTANEOUS at 17:04

## 2018-04-05 RX ADMIN — SODIUM CHLORIDE 1500 MILLILITER(S): 9 INJECTION INTRAMUSCULAR; INTRAVENOUS; SUBCUTANEOUS at 04:41

## 2018-04-05 RX ADMIN — PANTOPRAZOLE SODIUM 40 MILLIGRAM(S): 20 TABLET, DELAYED RELEASE ORAL at 12:10

## 2018-04-05 RX ADMIN — Medication 100 MILLIEQUIVALENT(S): at 01:37

## 2018-04-05 RX ADMIN — CHLORHEXIDINE GLUCONATE 1 APPLICATION(S): 213 SOLUTION TOPICAL at 05:13

## 2018-04-05 RX ADMIN — ENOXAPARIN SODIUM 40 MILLIGRAM(S): 100 INJECTION SUBCUTANEOUS at 12:11

## 2018-04-05 RX ADMIN — Medication 100 GRAM(S): at 07:28

## 2018-04-05 RX ADMIN — Medication 100 GRAM(S): at 10:07

## 2018-04-05 RX ADMIN — POTASSIUM PHOSPHATE, MONOBASIC POTASSIUM PHOSPHATE, DIBASIC 62.5 MILLIMOLE(S): 236; 224 INJECTION, SOLUTION INTRAVENOUS at 08:34

## 2018-04-05 RX ADMIN — POTASSIUM PHOSPHATE, MONOBASIC POTASSIUM PHOSPHATE, DIBASIC 85 MILLIMOLE(S): 236; 224 INJECTION, SOLUTION INTRAVENOUS at 14:08

## 2018-04-05 RX ADMIN — Medication 100 GRAM(S): at 01:14

## 2018-04-05 RX ADMIN — Medication 100 MILLIEQUIVALENT(S): at 00:35

## 2018-04-05 RX ADMIN — SODIUM CHLORIDE 200 MILLILITER(S): 9 INJECTION, SOLUTION INTRAVENOUS at 11:37

## 2018-04-05 RX ADMIN — Medication 100 GRAM(S): at 08:34

## 2018-04-05 RX ADMIN — CHLORHEXIDINE GLUCONATE 1 APPLICATION(S): 213 SOLUTION TOPICAL at 17:00

## 2018-04-05 RX ADMIN — INSULIN GLARGINE 30 UNIT(S): 100 INJECTION, SOLUTION SUBCUTANEOUS at 16:29

## 2018-04-05 RX ADMIN — Medication 200 GRAM(S): at 13:42

## 2018-04-05 NOTE — PHYSICAL THERAPY INITIAL EVALUATION ADULT - ADDITIONAL COMMENTS
Patient ind with ambulation, however states her balance is off and has had episodes of falls and near falls in the past. Also reports chronic neck, back and bilateral shoulder pain for which she is getting outpatient PT services.

## 2018-04-05 NOTE — PROGRESS NOTE ADULT - PROBLEM SELECTOR PROBLEM 3
Neuropathy Asthma, unspecified asthma severity, unspecified whether complicated, unspecified whether persistent

## 2018-04-05 NOTE — PROGRESS NOTE ADULT - PROBLEM SELECTOR PROBLEM 4
Asthma, unspecified asthma severity, unspecified whether complicated, unspecified whether persistent Prophylactic measure

## 2018-04-05 NOTE — PROGRESS NOTE ADULT - ASSESSMENT
33 yo female from home lives with 2 children. PMH Type 1 DM (for the past 14 years), asthma, HLD, IBS, gastritis, depression, s/p , tubal ligation, cholecystectomy, acne (on abx) and bilateral cataracts admitted to ICU for DKA.

## 2018-04-05 NOTE — PHYSICAL THERAPY INITIAL EVALUATION ADULT - CRITERIA FOR SKILLED THERAPEUTIC INTERVENTIONS
functional limitations in following categories/risk reduction/prevention/predicted duration of therapy intervention/anticipated discharge recommendation/anticipated equipment needs at discharge/rehab potential/impairments found/therapy frequency

## 2018-04-05 NOTE — CONSULT NOTE ADULT - ASSESSMENT
DKA  Patient presented with  with AG 23 with ph 7.18.  Admitted for DKA, reports non compliance.  Patient is still on Insulin drip with anion gap 10 this AM.   Patient is on Trasiba 90U once a day. DKA  Patient presented with  with AG 23 with ph 7.18.  Admitted for DKA, reports non compliance.  Patient is still on Insulin drip with anion gap 10 this AM.   Patient reports taking Trasiba 90U once a day but also suggests non compliance. DKA  Patient presented with  with AG 23 with ph 7.18.  Admitted for DKA, reports non compliance.  Patient reports taking Trasiba 90U once a day but also suggests non compliance.    Patient is still on Insulin drip with anion gap 10 this AM.   clinically improving  rec starting lantus 30 units   advance diet  add humalog 4u ac tid  d/w hs

## 2018-04-05 NOTE — PHYSICAL THERAPY INITIAL EVALUATION ADULT - REFERRAL TO ANOTHER SERVICE NEEDED, PT EVAL
social work/Patient recommended home with home PT however pt currently resides in shelter with two children.  may benefit from  for proper discharge disposition.

## 2018-04-05 NOTE — PHYSICAL THERAPY INITIAL EVALUATION ADULT - ACTIVE RANGE OF MOTION EXAMINATION, REHAB EVAL
albert. upper extremity Active ROM was WNL (within normal limits)/bilateral  lower extremity Active ROM was WFL (within functional limits)

## 2018-04-05 NOTE — PROGRESS NOTE ADULT - PROBLEM SELECTOR PLAN 2
pt presented with nausea, vomiting, diarrhea and chills over the past 3 days also likely from DKA  No WBC or fever   -c/w IVF and Zofran PRN -will resume gabapentin today

## 2018-04-05 NOTE — CONSULT NOTE ADULT - SUBJECTIVE AND OBJECTIVE BOX
31 yo female from home lives with 2 children. PMH Type 1 DM (for the past 14 years), asthma, HLD, IBS, gastritis, depression, s/p , tubal ligation, cholecystectomy, acne (on abx) and bilateral cataracts. Pt was BIBA for 3 days of vomiting, diarrhea, abdominal pain.  Patient is somnolent but able to provide some history; additional medical history obtained from her PMD Dr. Louis. Endorses 3 days of chills, NBNB vomiting, nonbloody diarrhea and generalized abdominal pain x 3 days. Patient usually takes long acting insulin Tresiba 90U daily and sliding scale Humalog but has not taken Tresiba x past 2 days, states took 25U short acting insulin yesterday. Patient saw PMD yesterday, who told her to come to ED. Currently, also endorses chest pain from vomiting and SOB.     Denies fever, headache, change in vision, sore throat, cough, dysuria, hematuria, back pain, vaginal discharge. Denies sick contacts at home.  Last sexual encounter unprotected 3 days ago, smoked marijuana 3 days ago. (2018 14:42)      PAST MEDICAL & SURGICAL HISTORY:  Depression, unspecified depression type  Asthma, unspecified asthma severity, unspecified whether complicated, unspecified whether persistent  HSV-2 infection  HSV-1 infection  GERD (gastroesophageal reflux disease)  Neuropathy  Insulin dependent diabetes mellitus  Diabetes mellitus type 1  S/P endoscopy  H/O colonoscopy  S/P cataract surgery  H/O tubal ligation  History of   S/P cholecystectomy  Insulin pump in place         MEDICATIONS  (STANDING):  chlorhexidine 4% Liquid 1 Application(s) Topical two times a day  dextrose 5% + sodium chloride 0.9%. 1000 milliLiter(s) (200 mL/Hr) IV Continuous <Continuous>  enoxaparin Injectable 40 milliGRAM(s) SubCutaneous daily  insulin Infusion 4 Unit(s)/Hr (4 mL/Hr) IV Continuous <Continuous>  pantoprazole  Injectable 40 milliGRAM(s) IV Push daily    MEDICATIONS  (PRN):  acetaminophen   Tablet. 650 milliGRAM(s) Oral every 6 hours PRN Severe Pain (7 - 10)  ondansetron Injectable 4 milliGRAM(s) IV Push every 8 hours PRN Nausea and/or Vomiting      FAMILY HISTORY:  No pertinent family history in first degree relatives      SOCIAL HISTORY:      REVIEW OF SYSTEMS:  CONSTITUTIONAL: No fever, weight loss, or fatigue  NECK: No pain or stiffness  RESPIRATORY: No cough, wheezing, chills or hemoptysis; No Shortness of Breath  CARDIOVASCULAR: No chest pain, palpitations, passing out, dizziness, or leg swelling  GASTROINTESTINAL: No abdominal pain. No nausea, vomiting,  No diarrhea or constipation.   GENITOURINARY: No dysuria, frequency, hematuria, or incontinence  NEUROLOGICAL: No headaches, memory loss, loss of strength, numbness, or tremors  ENDOCRINE: No heat or cold intolerance;       Vital Signs Last 24 Hrs  T(C): 36.8 (2018 06:00), Max: 37.3 (2018 00:00)  T(F): 98.2 (2018 06:00), Max: 99.1 (2018 00:00)  HR: 93 (2018 11:00) (91 - 129)  BP: 84/64 (2018 11:00) (84/48 - 133/93)  BP(mean): 69 (:00) (55 - 90)  RR: 14 (2018 11:00) (0 - 23)  SpO2: 100% (2018 11:00) (98% - 100%)      Constitutional:    NC/AT:  HEENT:  Neck:  No JVD, bruits or thyromegaly  Respiratory:  Clear without rales or rhonchi  Cardiovascular:  RR without murmur, rub or gallop.  Gastrointestinal: Soft without hepatosplenomegaly.  Extremities: without cyanosis, clubbing or edema.  Neurological:  Oriented   x      . No gross sensory or motor defects.        LABS:                        9.1    11.6  )-----------( 312      ( 2018 06:16 )             29.8     04-05    140  |  116<H>  |  3<L>  ----------------------------<  157<H>  4.1   |  14<L>  |  0.63    Ca    6.7<L>      2018 06:16  Phos  2.3     04-05  Mg     1.3     04-05    TPro  6.7  /  Alb  2.9<L>  /  TBili  0.3  /  DBili  x   /  AST  10  /  ALT  14  /  AlkPhos  80  04-04    CARDIAC MARKERS ( 2018 13:16 )  <0.015 ng/mL / x     / x     / x     / x            Urinalysis Basic - ( 2018 15:21 )    Color: Yellow / Appearance: Clear / S.020 / pH: x  Gluc: x / Ketone: Large  / Bili: Negative / Urobili: Negative   Blood: x / Protein: 30 mg/dL / Nitrite: Negative   Leuk Esterase: Negative / RBC: x / WBC 0-2 /HPF   Sq Epi: x / Non Sq Epi: Few /HPF / Bacteria: x      CAPILLARY BLOOD GLUCOSE      POCT Blood Glucose.: 185 mg/dL (2018 10:01)  POCT Blood Glucose.: 220 mg/dL (2018 09:07)  POCT Blood Glucose.: 206 mg/dL (2018 08:03)  POCT Blood Glucose.: 188 mg/dL (2018 06:55)  POCT Blood Glucose.: 158 mg/dL (2018 05:07)  POCT Blood Glucose.: 167 mg/dL (2018 04:05)  POCT Blood Glucose.: 166 mg/dL (2018 03:04)  POCT Blood Glucose.: 233 mg/dL (2018 01:53)  POCT Blood Glucose.: 241 mg/dL (2018 01:07)  POCT Blood Glucose.: 244 mg/dL (2018 00:01)  POCT Blood Glucose.: 236 mg/dL (2018 23:01)  POCT Blood Glucose.: 275 mg/dL (2018 21:56)  POCT Blood Glucose.: 267 mg/dL (2018 21:00)  POCT Blood Glucose.: 319 mg/dL (2018 20:19)  POCT Blood Glucose.: 227 mg/dL (2018 18:59)  POCT Blood Glucose.: 241 mg/dL (2018 18:03)  POCT Blood Glucose.: 235 mg/dL (2018 17:05)  POCT Blood Glucose.: 302 mg/dL (2018 15:38)  POCT Blood Glucose.: 346 mg/dL (2018 14:37)  POCT Blood Glucose.: 458 mg/dL (2018 13:30)  POCT Blood Glucose.: 464 mg/dL (2018 11:54)      RADIOLOGY & ADDITIONAL STUDIES:

## 2018-04-05 NOTE — CHART NOTE - NSCHARTNOTEFT_GEN_A_CORE
33 yo female from home lives with 2 children. PMH Type 1 DM (for the past 14 years), asthma, HLD, IBS, gastritis, depression, s/p , tubal ligation, cholecystectomy, acne (on abx) and bilateral cataracts admitted to ICU for DKA.     Diabetic ketoacidosis without coma associated with type 1 diabetes mellitus.-   patient presented with  with AG 23 with ph 7.18.  patient was started on insulin drip  and s/p 3l ns bolus and adequate hydration   anion gap closed and started on 30 units lantus and humalog 4 units with meals   advanced diet   patient has no pcp lives in shelter non -compliance with medications    Patient reports taking Trasiba 90U once a day   will follow with ENDO :    Follow with social work     DM Neuropathy  : c/w gabapentin 100 TID     IMPROVE score of 0   will d/c dvt prophylaxis's  out of bed to chair       patient downgraded to   and report given to

## 2018-04-06 ENCOUNTER — TRANSCRIPTION ENCOUNTER (OUTPATIENT)
Age: 33
End: 2018-04-06

## 2018-04-06 LAB
ALBUMIN SERPL ELPH-MCNC: 2.2 G/DL — LOW (ref 3.5–5)
ALP SERPL-CCNC: 65 U/L — SIGNIFICANT CHANGE UP (ref 40–120)
ALT FLD-CCNC: 11 U/L DA — SIGNIFICANT CHANGE UP (ref 10–60)
ANION GAP SERPL CALC-SCNC: 8 MMOL/L — SIGNIFICANT CHANGE UP (ref 5–17)
AST SERPL-CCNC: 13 U/L — SIGNIFICANT CHANGE UP (ref 10–40)
BASOPHILS # BLD AUTO: 0.1 K/UL — SIGNIFICANT CHANGE UP (ref 0–0.2)
BASOPHILS NFR BLD AUTO: 1 % — SIGNIFICANT CHANGE UP (ref 0–2)
BILIRUB SERPL-MCNC: 0.3 MG/DL — SIGNIFICANT CHANGE UP (ref 0.2–1.2)
BUN SERPL-MCNC: 2 MG/DL — LOW (ref 7–18)
CALCIUM SERPL-MCNC: 7.4 MG/DL — LOW (ref 8.4–10.5)
CHLORIDE SERPL-SCNC: 116 MMOL/L — HIGH (ref 96–108)
CO2 SERPL-SCNC: 19 MMOL/L — LOW (ref 22–31)
CREAT SERPL-MCNC: 0.47 MG/DL — LOW (ref 0.5–1.3)
CULTURE RESULTS: SIGNIFICANT CHANGE UP
EOSINOPHIL # BLD AUTO: 0.1 K/UL — SIGNIFICANT CHANGE UP (ref 0–0.5)
EOSINOPHIL NFR BLD AUTO: 1.1 % — SIGNIFICANT CHANGE UP (ref 0–6)
GLUCOSE BLDC GLUCOMTR-MCNC: 126 MG/DL — HIGH (ref 70–99)
GLUCOSE BLDC GLUCOMTR-MCNC: 162 MG/DL — HIGH (ref 70–99)
GLUCOSE BLDC GLUCOMTR-MCNC: 186 MG/DL — HIGH (ref 70–99)
GLUCOSE BLDC GLUCOMTR-MCNC: 78 MG/DL — SIGNIFICANT CHANGE UP (ref 70–99)
GLUCOSE SERPL-MCNC: 83 MG/DL — SIGNIFICANT CHANGE UP (ref 70–99)
HCT VFR BLD CALC: 28.6 % — LOW (ref 34.5–45)
HGB BLD-MCNC: 8.9 G/DL — LOW (ref 11.5–15.5)
LYMPHOCYTES # BLD AUTO: 2.1 K/UL — SIGNIFICANT CHANGE UP (ref 1–3.3)
LYMPHOCYTES # BLD AUTO: 38.6 % — SIGNIFICANT CHANGE UP (ref 13–44)
MAGNESIUM SERPL-MCNC: 1.9 MG/DL — SIGNIFICANT CHANGE UP (ref 1.6–2.6)
MCHC RBC-ENTMCNC: 25.7 PG — LOW (ref 27–34)
MCHC RBC-ENTMCNC: 31.1 GM/DL — LOW (ref 32–36)
MCV RBC AUTO: 82.6 FL — SIGNIFICANT CHANGE UP (ref 80–100)
MONOCYTES # BLD AUTO: 0.4 K/UL — SIGNIFICANT CHANGE UP (ref 0–0.9)
MONOCYTES NFR BLD AUTO: 7.3 % — SIGNIFICANT CHANGE UP (ref 2–14)
NEUTROPHILS # BLD AUTO: 2.8 K/UL — SIGNIFICANT CHANGE UP (ref 1.8–7.4)
NEUTROPHILS NFR BLD AUTO: 52 % — SIGNIFICANT CHANGE UP (ref 43–77)
PHOSPHATE SERPL-MCNC: 2.4 MG/DL — LOW (ref 2.5–4.5)
PLATELET # BLD AUTO: 250 K/UL — SIGNIFICANT CHANGE UP (ref 150–400)
POTASSIUM SERPL-MCNC: 3.2 MMOL/L — LOW (ref 3.5–5.3)
POTASSIUM SERPL-SCNC: 3.2 MMOL/L — LOW (ref 3.5–5.3)
PROT SERPL-MCNC: 5.3 G/DL — LOW (ref 6–8.3)
RBC # BLD: 3.46 M/UL — LOW (ref 3.8–5.2)
RBC # FLD: 13.5 % — SIGNIFICANT CHANGE UP (ref 10.3–14.5)
SODIUM SERPL-SCNC: 143 MMOL/L — SIGNIFICANT CHANGE UP (ref 135–145)
SPECIMEN SOURCE: SIGNIFICANT CHANGE UP
WBC # BLD: 5.3 K/UL — SIGNIFICANT CHANGE UP (ref 3.8–10.5)
WBC # FLD AUTO: 5.3 K/UL — SIGNIFICANT CHANGE UP (ref 3.8–10.5)

## 2018-04-06 PROCEDURE — 99233 SBSQ HOSP IP/OBS HIGH 50: CPT | Mod: GC

## 2018-04-06 RX ORDER — SODIUM CHLORIDE 9 MG/ML
1 INJECTION, SOLUTION INTRAVENOUS ONCE
Qty: 0 | Refills: 0 | Status: DISCONTINUED | OUTPATIENT
Start: 2018-04-06 | End: 2018-04-06

## 2018-04-06 RX ORDER — GABAPENTIN 400 MG/1
1 CAPSULE ORAL
Qty: 0 | Refills: 0 | COMMUNITY
Start: 2018-04-06

## 2018-04-06 RX ORDER — ACETAMINOPHEN 500 MG
2 TABLET ORAL
Qty: 0 | Refills: 0 | COMMUNITY
Start: 2018-04-06

## 2018-04-06 RX ORDER — INSULIN LISPRO 100/ML
0 VIAL (ML) SUBCUTANEOUS
Qty: 0 | Refills: 0 | COMMUNITY

## 2018-04-06 RX ORDER — POTASSIUM CHLORIDE 20 MEQ
40 PACKET (EA) ORAL EVERY 4 HOURS
Qty: 0 | Refills: 0 | Status: COMPLETED | OUTPATIENT
Start: 2018-04-06 | End: 2018-04-06

## 2018-04-06 RX ORDER — SODIUM,POTASSIUM PHOSPHATES 278-250MG
1 POWDER IN PACKET (EA) ORAL
Qty: 0 | Refills: 0 | Status: COMPLETED | OUTPATIENT
Start: 2018-04-06 | End: 2018-04-07

## 2018-04-06 RX ORDER — INSULIN DEGLUDEC 100 U/ML
90 INJECTION, SOLUTION SUBCUTANEOUS
Qty: 0 | Refills: 0 | COMMUNITY

## 2018-04-06 RX ORDER — SODIUM CHLORIDE 9 MG/ML
1000 INJECTION, SOLUTION INTRAVENOUS ONCE
Qty: 0 | Refills: 0 | Status: COMPLETED | OUTPATIENT
Start: 2018-04-06 | End: 2018-04-06

## 2018-04-06 RX ORDER — GABAPENTIN 400 MG/1
1 CAPSULE ORAL
Qty: 0 | Refills: 0 | COMMUNITY

## 2018-04-06 RX ORDER — INSULIN GLARGINE 100 [IU]/ML
24 INJECTION, SOLUTION SUBCUTANEOUS AT BEDTIME
Qty: 0 | Refills: 0 | Status: DISCONTINUED | OUTPATIENT
Start: 2018-04-06 | End: 2018-04-07

## 2018-04-06 RX ADMIN — Medication 40 MILLIEQUIVALENT(S): at 13:25

## 2018-04-06 RX ADMIN — Medication 2: at 17:14

## 2018-04-06 RX ADMIN — ENOXAPARIN SODIUM 40 MILLIGRAM(S): 100 INJECTION SUBCUTANEOUS at 11:19

## 2018-04-06 RX ADMIN — SODIUM CHLORIDE 333.33 MILLILITER(S): 9 INJECTION, SOLUTION INTRAVENOUS at 11:18

## 2018-04-06 RX ADMIN — GABAPENTIN 100 MILLIGRAM(S): 400 CAPSULE ORAL at 13:25

## 2018-04-06 RX ADMIN — Medication 1 TABLET(S): at 12:42

## 2018-04-06 RX ADMIN — INSULIN GLARGINE 24 UNIT(S): 100 INJECTION, SOLUTION SUBCUTANEOUS at 22:37

## 2018-04-06 RX ADMIN — Medication 1 TABLET(S): at 22:37

## 2018-04-06 RX ADMIN — Medication 4 UNIT(S): at 17:13

## 2018-04-06 RX ADMIN — GABAPENTIN 100 MILLIGRAM(S): 400 CAPSULE ORAL at 22:37

## 2018-04-06 RX ADMIN — GABAPENTIN 100 MILLIGRAM(S): 400 CAPSULE ORAL at 06:06

## 2018-04-06 RX ADMIN — Medication 4 UNIT(S): at 12:15

## 2018-04-06 RX ADMIN — Medication 40 MILLIEQUIVALENT(S): at 11:18

## 2018-04-06 RX ADMIN — Medication 1 TABLET(S): at 17:15

## 2018-04-06 RX ADMIN — Medication 4 UNIT(S): at 09:05

## 2018-04-06 NOTE — PROGRESS NOTE ADULT - ATTENDING COMMENTS
Patient seen and examined earlier this morning ; Agree with PGY1 A/P above with editing as needed. d/w PGY1 Dr. Mendenhall    Patient is a 32y old  Female Hx Type I DM, who presents with a chief complaint of Nausea, vomiting and diarrhea found to have DKA. Admitted to ICU s/p Insulin drip and downgraded to the floor last night. sugars now in the lower side down to 60's. patient admits to Non compliance with Insulin; Lives in shelter; Under immense stress with 2 kids aged 14 and 9;     Vitals: reviewed; stable    P/E:  Neuro: AAO x3; No focal deficits  CVS: S1S2 present; regular  Resp: BLAE+, No wheeze or Rhonchi  GI: Soft, BS+, NT  Extr: No edema or calf tenderness    labs: Stable    D/D:  DKA due to Non compliance with Insulin  Psychosocial situational stress    Plan:  Decreased lantus to 24 units and continue with Humalog 4 units with meals  As per patient not eating much here as she has IBD and only eats selected foods  Also her sugar runs higher in 150 to 190 range due to getting served Pasta and Pizza alternating at shelter as per patient  Discussed with Endo Dr. wells; advised Lantus/ Tersiba plus Humalog  I have counseled patient at length for 15 mins on need for sticking to her regimen and prefer to take Lantus and Humalog which she has verbalized understanding; will get Diabetic teaching d/w RN and Nutritional consult prior to discharge.   If sugars remain stable and no low sugars next 24 hrs, D/C Home in AM  Discussed with  Srikanth, patient can return back to shelter; APS to follow.    Discussed with PGY1 Dr. Mendenhall  Discussed with RN
pt seen and examined  gap closed, but pH remains acidic  case d/w Endo- recommend continue insulin drip for now      Assessment  DKA  dm 1 with neuropathy    Plan  IVF  Insulin drip  check a1c  endo f/u to convert, ? with next set of blood work  monitor and supplement electrolyte .   monitor in ICU for now.

## 2018-04-06 NOTE — DISCHARGE NOTE ADULT - PATIENT PORTAL LINK FT
You can access the [facility], offered by [facility], by registering with the following website: [email or web address]

## 2018-04-06 NOTE — PROGRESS NOTE ADULT - ASSESSMENT
DKA  FG 78 this AM with Lantus 30 U yesterday  Recommend changing Lantus to and Humalog to  Continue diabetic diet. DKA  FG 78 this AM with Lantus 30 U yesterday  Recommend changing Lantus to 24 and cont Humalog 4 ac tid  Continue diabetic diet.    fsg ac and hs  d/c planning  d/w hs

## 2018-04-06 NOTE — PROGRESS NOTE ADULT - PROBLEM SELECTOR PROBLEM 1
Diabetic ketoacidosis without coma associated with type 1 diabetes mellitus
Diabetic ketoacidosis without coma associated with type 1 diabetes mellitus

## 2018-04-06 NOTE — DISCHARGE NOTE ADULT - PLAN OF CARE
HbA1c <7.0%, medication compliance Please take insulin as listed above. You will need 25 units at bedtime and 4 units of humalog with each of your 3 meals of the day. Continue to monitor your morning fingerstick glucose values. It is very important that you take all medications in a timely fashion to avoid another episode of diabetic ketoacidosis, as well as reduce your risk for diabetes-related complications, including cardiovascular events, infection, kidney disease, visual changes, and nerve pain / loss of sensation. Please follow up with your primary care doctor in 1-2 weeks with your blood glucose readings so that your insulin dosage can be adjusted accordingly. Please continue with gabapentin as above. Again, it is emphasized that you be compliant with all medications as better control of diabetes can prevent further progression of nerve pain and can prevent loss of sensation. Continue with all medications as above. Follow-up with your primary care doctor as routine. You was found to be in Diabetic Ketoacidosis due to non compliance with insulin. You was treated with Insulin drip in ICU and then downgraded to floors with long and short acting Insulin with very good control of sugars. You was seen by Endocrinologist Dr. Rabia Peralta during hospital stay. You have been counseled at length on Dietary and especially Medication compliance at length and verbalized understanding. Please take insulin as listed above. You will need long acting insulin Lantus 25 units at bedtime and 4 units of short acting Humalog with each of your 3 meals of the day. Continue to monitor your morning fingerstick glucose values. It is very important that you take all medications in a timely fashion to avoid another episode of diabetic ketoacidosis, as well as reduce your risk for diabetes-related complications, including cardiovascular events, infection, kidney disease, visual changes, and nerve pain / loss of sensation. Please follow up with your primary care doctor in 1-2 weeks with your blood glucose readings so that your insulin dosage can be adjusted accordingly. You have told that you have a new Endocrinologist to follow up with. Consider reinsertion of Insulin pump for better control of sugars. Prevent symptoms and complications. Stabilize mood and improve quality of life You admitted to significant Psychosocial stresses. Continue with Prozac as before, Follow up with Psychologist or Psychiatrist for continued care You admitted to significant Psychosocial stresses.  Follow up with Psychologist or Psychiatrist as outpatient You were found to be in Diabetic Ketoacidosis due to non compliance with insulin. You were treated with Insulin drip in ICU and then downgraded to floors with long and short acting Insulin with very good control of sugars. You were seen by Endocrinologist Dr. Rabia Peralta during hospital stay. You have been counseled at length on Dietary and especially Medication compliance at length and verbalized understanding. Please take insulin as listed above. You will need long acting insulin Lantus 25 units at bedtime and 4 units of short acting Humalog with each of your 3 meals of the day. Continue to monitor your morning fingerstick glucose values. It is very important that you take all medications in a timely fashion to avoid another episode of diabetic ketoacidosis, as well as reduce your risk for diabetes-related complications, including cardiovascular events, infection, kidney disease, visual changes, and nerve pain / loss of sensation. Please follow up with your primary care doctor in 1-2 weeks with your blood glucose readings so that your insulin dosage can be adjusted accordingly. You have told that you have a new Endocrinologist to follow up with. Consider reinsertion of Insulin pump for better control of sugars.

## 2018-04-06 NOTE — DISCHARGE NOTE ADULT - CARE PLAN
Principal Discharge DX:	Diabetic ketoacidosis without coma associated with type 1 diabetes mellitus  Secondary Diagnosis:	Gastroenteritis  Secondary Diagnosis:	Neuropathy  Secondary Diagnosis:	Asthma, unspecified asthma severity, unspecified whether complicated, unspecified whether persistent Principal Discharge DX:	Diabetic ketoacidosis without coma associated with type 1 diabetes mellitus  Secondary Diagnosis:	Neuropathy  Secondary Diagnosis:	Asthma, unspecified asthma severity, unspecified whether complicated, unspecified whether persistent Principal Discharge DX:	Diabetic ketoacidosis without coma associated with type 1 diabetes mellitus  Goal:	HbA1c <7.0%, medication compliance  Assessment and plan of treatment:	Please take insulin as listed above. You will need 25 units at bedtime and 4 units of humalog with each of your 3 meals of the day. Continue to monitor your morning fingerstick glucose values. It is very important that you take all medications in a timely fashion to avoid another episode of diabetic ketoacidosis, as well as reduce your risk for diabetes-related complications, including cardiovascular events, infection, kidney disease, visual changes, and nerve pain / loss of sensation. Please follow up with your primary care doctor in 1-2 weeks with your blood glucose readings so that your insulin dosage can be adjusted accordingly.  Secondary Diagnosis:	Neuropathy  Assessment and plan of treatment:	Please continue with gabapentin as above. Again, it is emphasized that you be compliant with all medications as better control of diabetes can prevent further progression of nerve pain and can prevent loss of sensation.  Secondary Diagnosis:	Asthma, unspecified asthma severity, unspecified whether complicated, unspecified whether persistent  Assessment and plan of treatment:	Continue with all medications as above. Follow-up with your primary care doctor as routine. Principal Discharge DX:	Diabetic ketoacidosis without coma associated with type 1 diabetes mellitus  Goal:	HbA1c <7.0%, medication compliance  Assessment and plan of treatment:	You was found to be in Diabetic Ketoacidosis due to non compliance with insulin. You was treated with Insulin drip in ICU and then downgraded to floors with long and short acting Insulin with very good control of sugars. You was seen by Endocrinologist Dr. Rabia Peralta during hospital stay. You have been counseled at length on Dietary and especially Medication compliance at length and verbalized understanding. Please take insulin as listed above. You will need long acting insulin Lantus 25 units at bedtime and 4 units of short acting Humalog with each of your 3 meals of the day. Continue to monitor your morning fingerstick glucose values. It is very important that you take all medications in a timely fashion to avoid another episode of diabetic ketoacidosis, as well as reduce your risk for diabetes-related complications, including cardiovascular events, infection, kidney disease, visual changes, and nerve pain / loss of sensation. Please follow up with your primary care doctor in 1-2 weeks with your blood glucose readings so that your insulin dosage can be adjusted accordingly. You have told that you have a new Endocrinologist to follow up with. Consider reinsertion of Insulin pump for better control of sugars.  Secondary Diagnosis:	Neuropathy  Goal:	Prevent symptoms and complications.  Assessment and plan of treatment:	Please continue with gabapentin as above. Again, it is emphasized that you be compliant with all medications as better control of diabetes can prevent further progression of nerve pain and can prevent loss of sensation.  Secondary Diagnosis:	Asthma, unspecified asthma severity, unspecified whether complicated, unspecified whether persistent  Assessment and plan of treatment:	Continue with all medications as above. Follow-up with your primary care doctor as routine.  Secondary Diagnosis:	Depression  Goal:	Stabilize mood and improve quality of life  Assessment and plan of treatment:	You admitted to significant Psychosocial stresses. Continue with Prozac as before, Follow up with Psychologist or Psychiatrist for continued care Principal Discharge DX:	Diabetic ketoacidosis without coma associated with type 1 diabetes mellitus  Goal:	HbA1c <7.0%, medication compliance  Assessment and plan of treatment:	You was found to be in Diabetic Ketoacidosis due to non compliance with insulin. You was treated with Insulin drip in ICU and then downgraded to floors with long and short acting Insulin with very good control of sugars. You was seen by Endocrinologist Dr. Rabia Peralta during hospital stay. You have been counseled at length on Dietary and especially Medication compliance at length and verbalized understanding. Please take insulin as listed above. You will need long acting insulin Lantus 25 units at bedtime and 4 units of short acting Humalog with each of your 3 meals of the day. Continue to monitor your morning fingerstick glucose values. It is very important that you take all medications in a timely fashion to avoid another episode of diabetic ketoacidosis, as well as reduce your risk for diabetes-related complications, including cardiovascular events, infection, kidney disease, visual changes, and nerve pain / loss of sensation. Please follow up with your primary care doctor in 1-2 weeks with your blood glucose readings so that your insulin dosage can be adjusted accordingly. You have told that you have a new Endocrinologist to follow up with. Consider reinsertion of Insulin pump for better control of sugars.  Secondary Diagnosis:	Neuropathy  Goal:	Prevent symptoms and complications.  Assessment and plan of treatment:	Please continue with gabapentin as above. Again, it is emphasized that you be compliant with all medications as better control of diabetes can prevent further progression of nerve pain and can prevent loss of sensation.  Secondary Diagnosis:	Asthma, unspecified asthma severity, unspecified whether complicated, unspecified whether persistent  Assessment and plan of treatment:	Continue with all medications as above. Follow-up with your primary care doctor as routine.  Secondary Diagnosis:	Depression  Goal:	Stabilize mood and improve quality of life  Assessment and plan of treatment:	You admitted to significant Psychosocial stresses.  Follow up with Psychologist or Psychiatrist as outpatient Principal Discharge DX:	Diabetic ketoacidosis without coma associated with type 1 diabetes mellitus  Goal:	HbA1c <7.0%, medication compliance  Assessment and plan of treatment:	You were found to be in Diabetic Ketoacidosis due to non compliance with insulin. You were treated with Insulin drip in ICU and then downgraded to floors with long and short acting Insulin with very good control of sugars. You were seen by Endocrinologist Dr. Rabia Peralta during hospital stay. You have been counseled at length on Dietary and especially Medication compliance at length and verbalized understanding. Please take insulin as listed above. You will need long acting insulin Lantus 25 units at bedtime and 4 units of short acting Humalog with each of your 3 meals of the day. Continue to monitor your morning fingerstick glucose values. It is very important that you take all medications in a timely fashion to avoid another episode of diabetic ketoacidosis, as well as reduce your risk for diabetes-related complications, including cardiovascular events, infection, kidney disease, visual changes, and nerve pain / loss of sensation. Please follow up with your primary care doctor in 1-2 weeks with your blood glucose readings so that your insulin dosage can be adjusted accordingly. You have told that you have a new Endocrinologist to follow up with. Consider reinsertion of Insulin pump for better control of sugars.  Secondary Diagnosis:	Neuropathy  Goal:	Prevent symptoms and complications.  Assessment and plan of treatment:	Please continue with gabapentin as above. Again, it is emphasized that you be compliant with all medications as better control of diabetes can prevent further progression of nerve pain and can prevent loss of sensation.  Secondary Diagnosis:	Asthma, unspecified asthma severity, unspecified whether complicated, unspecified whether persistent  Assessment and plan of treatment:	Continue with all medications as above. Follow-up with your primary care doctor as routine.  Secondary Diagnosis:	Depression  Goal:	Stabilize mood and improve quality of life  Assessment and plan of treatment:	You admitted to significant Psychosocial stresses.  Follow up with Psychologist or Psychiatrist as outpatient

## 2018-04-06 NOTE — DISCHARGE NOTE ADULT - MEDICATION SUMMARY - MEDICATIONS TO TAKE
I will START or STAY ON the medications listed below when I get home from the hospital:    acetaminophen 325 mg oral tablet  -- 2 tab(s) by mouth every 6 hours, As needed, Severe Pain (7 - 10)  -- Indication: For Pain    gabapentin 100 mg oral capsule  -- 1 cap(s) by mouth 3 times a day  -- Indication: For Pain    HumaLOG Cartridge 100 units/mL injectable solution  -- 4 unit(s) injectable 3 times a day with meals  -- Indication: For Diabetes type 1    Tresiba FlexTouch 200 units/mL subcutaneous solution  -- 25 unit(s) subcutaneous once a day  -- Indication: For Diabetes type 1    Breo Ellipta 200 mcg-25 mcg/inh inhalation powder  -- 1 puff(s) inhaled once a day  -- Indication: For Asthma    Ventolin HFA 90 mcg/inh inhalation aerosol  -- 2 puff(s) inhaled 4 times a day  -- Indication: For Asthma

## 2018-04-06 NOTE — PROGRESS NOTE ADULT - PROBLEM SELECTOR PLAN 1
RESOLVED  c/w lantus 20 units (morning glucose is significantly low  c/w humalog 4 units TID  f/u endocrinologist Dr. Peralta  HbA1c is 12.4, likely medication non-compliance  f/u social work for shelter placement, prescriptions from [facility]
-on 4U /hr insulin drip, anion closed with 10 and bicarb 14  -will overlapped with Lantus today and start diet

## 2018-04-06 NOTE — DISCHARGE NOTE ADULT - SECONDARY DIAGNOSIS.
Gastroenteritis Neuropathy Asthma, unspecified asthma severity, unspecified whether complicated, unspecified whether persistent Depression

## 2018-04-06 NOTE — DISCHARGE NOTE ADULT - MEDICATION SUMMARY - MEDICATIONS TO CHANGE
I will SWITCH the dose or number of times a day I take the medications listed below when I get home from the hospital:    gabapentin 300 mg oral capsule  -- 1 cap(s) by mouth 3 times a day    Tresiba FlexTouch 200 units/mL subcutaneous solution  -- 90 unit(s) subcutaneous once a day    HumaLOG Cartridge 100 units/mL injectable solution    gabapentin enacarbil 300 mg oral tablet, extended release  -- 1 tab(s) by mouth once a day    gabapentin 300 mg oral capsule  -- 1 cap(s) by mouth 3 times a day

## 2018-04-06 NOTE — DISCHARGE NOTE ADULT - MEDICATION SUMMARY - MEDICATIONS TO STOP TAKING
I will STOP taking the medications listed below when I get home from the hospital:    ferrous sulfate 325 mg (65 mg elemental iron) oral tablet  -- 1 tab(s) by mouth 2 times a day    omeprazole 40 mg oral delayed release capsule  -- 1 cap(s) by mouth once a day    dicyclomine 10 mg oral capsule  -- 1 cap(s) by mouth 3 times a day    atorvastatin 20 mg oral tablet  -- 1 tab(s) by mouth once a day    atorvastatin 20 mg oral tablet  -- 1 tab(s) by mouth once a day

## 2018-04-06 NOTE — PROGRESS NOTE ADULT - ASSESSMENT
33 yo female from home lives with 2 children. PMH Type 1 DM (for the past 14 years), asthma, HLD, IBS, gastritis, depression, s/p , tubal ligation, cholecystectomy, acne (on abx) and bilateral cataracts admitted to ICU for DKA, gap closed, and downgraded to general medical floor.

## 2018-04-07 VITALS
DIASTOLIC BLOOD PRESSURE: 65 MMHG | OXYGEN SATURATION: 100 % | RESPIRATION RATE: 16 BRPM | SYSTOLIC BLOOD PRESSURE: 127 MMHG | HEART RATE: 73 BPM | TEMPERATURE: 98 F

## 2018-04-07 LAB
ANION GAP SERPL CALC-SCNC: 10 MMOL/L — SIGNIFICANT CHANGE UP (ref 5–17)
BUN SERPL-MCNC: 9 MG/DL — SIGNIFICANT CHANGE UP (ref 7–18)
CALCIUM SERPL-MCNC: 8 MG/DL — LOW (ref 8.4–10.5)
CHLORIDE SERPL-SCNC: 106 MMOL/L — SIGNIFICANT CHANGE UP (ref 96–108)
CO2 SERPL-SCNC: 22 MMOL/L — SIGNIFICANT CHANGE UP (ref 22–31)
CREAT SERPL-MCNC: 0.58 MG/DL — SIGNIFICANT CHANGE UP (ref 0.5–1.3)
CULTURE RESULTS: SIGNIFICANT CHANGE UP
GLUCOSE BLDC GLUCOMTR-MCNC: 184 MG/DL — HIGH (ref 70–99)
GLUCOSE BLDC GLUCOMTR-MCNC: 270 MG/DL — HIGH (ref 70–99)
GLUCOSE SERPL-MCNC: 270 MG/DL — HIGH (ref 70–99)
POTASSIUM SERPL-MCNC: 3.8 MMOL/L — SIGNIFICANT CHANGE UP (ref 3.5–5.3)
POTASSIUM SERPL-SCNC: 3.8 MMOL/L — SIGNIFICANT CHANGE UP (ref 3.5–5.3)
SODIUM SERPL-SCNC: 138 MMOL/L — SIGNIFICANT CHANGE UP (ref 135–145)
SPECIMEN SOURCE: SIGNIFICANT CHANGE UP

## 2018-04-07 PROCEDURE — 87040 BLOOD CULTURE FOR BACTERIA: CPT

## 2018-04-07 PROCEDURE — 96375 TX/PRO/DX INJ NEW DRUG ADDON: CPT

## 2018-04-07 PROCEDURE — 85027 COMPLETE CBC AUTOMATED: CPT

## 2018-04-07 PROCEDURE — 82009 KETONE BODYS QUAL: CPT

## 2018-04-07 PROCEDURE — 82010 KETONE BODYS QUAN: CPT

## 2018-04-07 PROCEDURE — 84443 ASSAY THYROID STIM HORMONE: CPT

## 2018-04-07 PROCEDURE — 82570 ASSAY OF URINE CREATININE: CPT

## 2018-04-07 PROCEDURE — 80061 LIPID PANEL: CPT

## 2018-04-07 PROCEDURE — 81001 URINALYSIS AUTO W/SCOPE: CPT

## 2018-04-07 PROCEDURE — 87177 OVA AND PARASITES SMEARS: CPT

## 2018-04-07 PROCEDURE — 84484 ASSAY OF TROPONIN QUANT: CPT

## 2018-04-07 PROCEDURE — 83036 HEMOGLOBIN GLYCOSYLATED A1C: CPT

## 2018-04-07 PROCEDURE — 83605 ASSAY OF LACTIC ACID: CPT

## 2018-04-07 PROCEDURE — 84702 CHORIONIC GONADOTROPIN TEST: CPT

## 2018-04-07 PROCEDURE — 87046 STOOL CULTR AEROBIC BACT EA: CPT

## 2018-04-07 PROCEDURE — 83930 ASSAY OF BLOOD OSMOLALITY: CPT

## 2018-04-07 PROCEDURE — 96374 THER/PROPH/DIAG INJ IV PUSH: CPT | Mod: XU

## 2018-04-07 PROCEDURE — 83690 ASSAY OF LIPASE: CPT

## 2018-04-07 PROCEDURE — 82803 BLOOD GASES ANY COMBINATION: CPT

## 2018-04-07 PROCEDURE — 97161 PT EVAL LOW COMPLEX 20 MIN: CPT

## 2018-04-07 PROCEDURE — 84100 ASSAY OF PHOSPHORUS: CPT

## 2018-04-07 PROCEDURE — 93005 ELECTROCARDIOGRAM TRACING: CPT

## 2018-04-07 PROCEDURE — 82962 GLUCOSE BLOOD TEST: CPT

## 2018-04-07 PROCEDURE — 71045 X-RAY EXAM CHEST 1 VIEW: CPT

## 2018-04-07 PROCEDURE — 83735 ASSAY OF MAGNESIUM: CPT

## 2018-04-07 PROCEDURE — 80048 BASIC METABOLIC PNL TOTAL CA: CPT

## 2018-04-07 PROCEDURE — 84156 ASSAY OF PROTEIN URINE: CPT

## 2018-04-07 PROCEDURE — 87045 FECES CULTURE AEROBIC BACT: CPT

## 2018-04-07 PROCEDURE — 80053 COMPREHEN METABOLIC PANEL: CPT

## 2018-04-07 PROCEDURE — 99285 EMERGENCY DEPT VISIT HI MDM: CPT | Mod: 25

## 2018-04-07 RX ORDER — INSULIN LISPRO 100/ML
4 VIAL (ML) SUBCUTANEOUS
Qty: 0 | Refills: 0 | COMMUNITY

## 2018-04-07 RX ORDER — ALBUTEROL 90 UG/1
2 AEROSOL, METERED ORAL
Qty: 0 | Refills: 0 | COMMUNITY

## 2018-04-07 RX ORDER — ATORVASTATIN CALCIUM 80 MG/1
1 TABLET, FILM COATED ORAL
Qty: 0 | Refills: 0 | COMMUNITY

## 2018-04-07 RX ORDER — INSULIN DEGLUDEC 100 U/ML
40 INJECTION, SOLUTION SUBCUTANEOUS
Qty: 0 | Refills: 0 | COMMUNITY
Start: 2018-04-07 | End: 2018-05-06

## 2018-04-07 RX ORDER — ALBUTEROL 90 UG/1
2 AEROSOL, METERED ORAL
Qty: 21.6 | Refills: 0 | OUTPATIENT
Start: 2018-04-07 | End: 2018-05-06

## 2018-04-07 RX ORDER — INSULIN DEGLUDEC 100 U/ML
42 INJECTION, SOLUTION SUBCUTANEOUS
Qty: 0 | Refills: 0 | DISCHARGE
Start: 2018-04-07 | End: 2018-05-06

## 2018-04-07 RX ORDER — INSULIN DEGLUDEC 100 U/ML
25 INJECTION, SOLUTION SUBCUTANEOUS
Qty: 3.75 | Refills: 0 | OUTPATIENT
Start: 2018-04-07 | End: 2018-05-06

## 2018-04-07 RX ORDER — INSULIN DEGLUDEC 100 U/ML
25 INJECTION, SOLUTION SUBCUTANEOUS
Qty: 0 | Refills: 0 | COMMUNITY

## 2018-04-07 RX ORDER — INSULIN LISPRO 100/ML
15 VIAL (ML) SUBCUTANEOUS
Qty: 0 | Refills: 0 | DISCHARGE
Start: 2018-04-07 | End: 2018-05-06

## 2018-04-07 RX ORDER — INSULIN DEGLUDEC 100 U/ML
40 INJECTION, SOLUTION SUBCUTANEOUS
Qty: 0 | Refills: 0 | DISCHARGE
Start: 2018-04-07 | End: 2018-05-06

## 2018-04-07 RX ORDER — FLUTICASONE FUROATE AND VILANTEROL TRIFENATATE 100; 25 UG/1; UG/1
1 POWDER RESPIRATORY (INHALATION)
Qty: 0 | Refills: 0 | COMMUNITY

## 2018-04-07 RX ORDER — FERROUS SULFATE 325(65) MG
1 TABLET ORAL
Qty: 0 | Refills: 0 | COMMUNITY

## 2018-04-07 RX ORDER — OMEPRAZOLE 10 MG/1
1 CAPSULE, DELAYED RELEASE ORAL
Qty: 0 | Refills: 0 | COMMUNITY

## 2018-04-07 RX ORDER — ACETAMINOPHEN 500 MG
2 TABLET ORAL
Qty: 80 | Refills: 0 | OUTPATIENT
Start: 2018-04-07 | End: 2018-04-16

## 2018-04-07 RX ORDER — FLUTICASONE FUROATE AND VILANTEROL TRIFENATATE 100; 25 UG/1; UG/1
1 POWDER RESPIRATORY (INHALATION)
Qty: 6.75 | Refills: 0 | OUTPATIENT
Start: 2018-04-07 | End: 2018-05-06

## 2018-04-07 RX ORDER — GABAPENTIN 400 MG/1
1 CAPSULE ORAL
Qty: 90 | Refills: 0 | OUTPATIENT
Start: 2018-04-07 | End: 2018-05-06

## 2018-04-07 RX ORDER — INSULIN LISPRO 100/ML
4 VIAL (ML) SUBCUTANEOUS
Qty: 3.6 | Refills: 0 | OUTPATIENT
Start: 2018-04-07 | End: 2018-05-06

## 2018-04-07 RX ADMIN — Medication 4 UNIT(S): at 11:53

## 2018-04-07 RX ADMIN — Medication 4 UNIT(S): at 08:22

## 2018-04-07 RX ADMIN — GABAPENTIN 100 MILLIGRAM(S): 400 CAPSULE ORAL at 05:45

## 2018-04-07 RX ADMIN — Medication 6: at 08:21

## 2018-04-07 RX ADMIN — Medication 1 TABLET(S): at 08:23

## 2018-04-07 RX ADMIN — Medication 2: at 11:53

## 2018-04-07 NOTE — PROGRESS NOTE ADULT - SUBJECTIVE AND OBJECTIVE BOX
INTERVAL HPI/OVERNIGHT EVENTS: no major event overnight.     PRESSORS: no pressor needed       Hematal[employer]:  enoxaparin Injectable 40 milliGRAM(s) SubCutaneous daily    Other:  acetaminophen   Tablet. 650 milliGRAM(s) Oral every 6 hours PRN  chlorhexidine 4% Liquid 1 Application(s) Topical two times a day  dextrose 5% + sodium chloride 0.9%. 1000 milliLiter(s) IV Continuous <Continuous>  insulin Infusion 4 Unit(s)/Hr IV Continuous <Continuous>  magnesium sulfate  IVPB 1 Gram(s) IV Intermittent every 1 hour  ondansetron Injectable 4 milliGRAM(s) IV Push every 8 hours PRN  pantoprazole  Injectable 40 milliGRAM(s) IV Push daily  potassium phosphate IVPB 15 milliMole(s) IV Intermittent once    acetaminophen   Tablet. 650 milliGRAM(s) Oral every 6 hours PRN  chlorhexidine 4% Liquid 1 Application(s) Topical two times a day  dextrose 5% + sodium chloride 0.9%. 1000 milliLiter(s) IV Continuous <Continuous>  enoxaparin Injectable 40 milliGRAM(s) SubCutaneous daily  insulin Infusion 4 Unit(s)/Hr IV Continuous <Continuous>  magnesium sulfate  IVPB 1 Gram(s) IV Intermittent every 1 hour  ondansetron Injectable 4 milliGRAM(s) IV Push every 8 hours PRN  pantoprazole  Injectable 40 milliGRAM(s) IV Push daily  potassium phosphate IVPB 15 milliMole(s) IV Intermittent once    Drug Dosing Weight  Height (cm): 175.26 (2018 11:52)  Weight (kg): 47.2 (2018 16:26)  BMI (kg/m2): 15.4 (2018 16:26)  BSA (m2): 1.56 (2018 16:26)    CENTRAL LINE: [ ] YES [ ] NO  LOCATION:   DATE INSERTED:  REMOVE: [ ] YES [ ] NO  EXPLAIN:    GEORGE: [ ] YES [ ] NO    DATE INSERTED:  REMOVE:  [ ] YES [ ] NO  EXPLAIN:    A-LINE:  [ ] YES [ ] NO  LOCATION:   DATE INSERTED:  REMOVE:  [ ] YES [ ] NO  EXPLAIN:    PMH -reviewed admission note, no change since admission    ICU Vital Signs Last 24 Hrs  T(C): 36.8 (2018 06:00), Max: 37.3 (2018 00:00)  T(F): 98.2 (2018 06:00), Max: 99.1 (2018 00:00)  HR: 98 (2018 07:31) (94 - 129)  BP: 93/63 (2018 07:31) (84/48 - 133/93)  BP(mean): 69 (2018 07:31) (55 - 90)  ABP: --  ABP(mean): --  RR: 17 (2018 07:31) (0 - 23)  SpO2: 99% (2018 07:31) (98% - 100%)            - @ 07:01  -  - @ 07:00  --------------------------------------------------------  IN: 5456.1 mL / OUT: 2805 mL / NET: 2651.1 mL            PHYSICAL EXAM:    GENERAL: [x ]NAD, [ x]well-groomed,   HEAD:  [x ]Atraumatic, [x ]Normocephalic  EYES: [x ]EOMI, [ x]PERRLA, [x ]conjunctiva and sclera clear  ENMT: [x ]No tonsillar erythema, exudates, or enlargement; [ ]Moist mucous membranes, [ ]Good dentition, [ ]No lesions  NECK: [x ]Supple, normal appearance, [x ]No JVD; [ ]Normal thyroid; [x ]Trachea midline  NERVOUS SYSTEM:  [ x]Alert & Oriented X3, [x ]Good concentration; [x ]Motor Strength 5/5 B/L upper and lower extremities; [x ]DTRs 2+ intact and symmetric  CHEST/LUNG: [x ]No chest deformity; [x]Normal percussion bilaterally; [x ]No rales, rhonchi, wheezing   HEART: [x ]Regular rate and rhythm; [x ]No murmurs, rubs, or gallops  ABDOMEN: [x ]Soft, Nontender, Nondistended; [ ]Bowel sounds present  EXTREMITIES:  [x ]2+ Peripheral Pulses, [x ]No clubbing, cyanosis, or edema  LYMPH: [x ]No lymphadenopathy noted  SKIN: [x ]No rashes or lesions; [ x]Good capillary refill      LABS:  CBC Full  -  ( 2018 06:16 )  WBC Count : 11.6 K/uL  Hemoglobin : 9.1 g/dL  Hematocrit : 29.8 %  Platelet Count - Automated : 312 K/uL  Mean Cell Volume : 84.0 fl  Mean Cell Hemoglobin : 25.6 pg  Mean Cell Hemoglobin Concentration : 30.5 gm/dL      04-05    140  |  116<H>  |  3<L>  ----------------------------<  157<H>  4.1   |  14<L>  |  0.63    Ca    6.7<L>      2018 06:16  Phos  2.3     04-05  Mg     1.3     04-05    TPro  6.7  /  Alb  2.9<L>  /  TBili  0.3  /  DBili  x   /  AST  10  /  ALT  14  /  AlkPhos  80  04-04      Urinalysis Basic - ( 2018 15:21 )    Color: Yellow / Appearance: Clear / S.020 / pH: x  Gluc: x / Ketone: Large  / Bili: Negative / Urobili: Negative   Blood: x / Protein: 30 mg/dL / Nitrite: Negative   Leuk Esterase: Negative / RBC: x / WBC 0-2 /HPF   Sq Epi: x / Non Sq Epi: Few /HPF / Bacteria: x        [x]GOALS OF CARE DISCUSSION WITH PATIENT/FAMILY/PROXY:    CRITICAL CARE TIME SPENT: 35 minutes
Interval Events:  pt feels well    Allergies    amoxicillin (Rash)    Intolerances      Endocrine/Metabolic Medications:  insulin glargine Injectable (LANTUS) 24 Unit(s) SubCutaneous at bedtime  insulin lispro (HumaLOG) corrective regimen sliding scale   SubCutaneous three times a day before meals  insulin lispro (HumaLOG) corrective regimen sliding scale   SubCutaneous at bedtime  insulin lispro Injectable (HumaLOG) 4 Unit(s) SubCutaneous three times a day before meals      Vital Signs Last 24 Hrs  T(C): 36.2 (07 Apr 2018 05:02), Max: 37 (06 Apr 2018 13:59)  T(F): 97.2 (07 Apr 2018 05:02), Max: 98.6 (06 Apr 2018 13:59)  HR: 69 (07 Apr 2018 05:02) (69 - 92)  BP: 107/73 (07 Apr 2018 05:02) (107/73 - 129/87)  BP(mean): --  RR: 16 (07 Apr 2018 05:02) (16 - 16)  SpO2: 96% (07 Apr 2018 05:02) (96% - 100%)      PHYSICAL EXAM  All physical exam findings normal, except those marked:  General:	Alert, active, cooperative, NAD, well hydrated  .		[] Abnormal:  Neck		Normal: supple, no cervical adenopathy, no palpable thyroid  .		[] Abnormal:  Cardiovascular	Normal: regular rate, normal S1, S2, no murmurs  .		[] Abnormal:  Respiratory	Normal: no chest wall deformity, normal respiratory pattern, CTA B/L  .		[] Abnormal:  Abdominal	Normal: soft, ND, NT, bowel sounds present, no masses, no organomegaly  .		[] Abnormal:  		Normal normal genitalia, testes descended, circumcised/uncircumcised  .		Yady stage:			Breast yady:  .		Menstrual history:  .		[] Abnormal:  Extremities	Normal: FROM x4  .		[] Abnormal:  Skin		Normal: intact and not indurated, no rash, no acanthosis nigricans  .		[] Abnormal:  Neurologic	Normal: grossly intact  .		[] Abnormal:    LABS                              138    |  106    |  9                   Calcium: 8.0   / iCa: x      (04-07 @ 08:03)    ----------------------------<  270       Magnesium: x                                3.8     |  22     |  0.58             Phosphorous: x          CAPILLARY BLOOD GLUCOSE      POCT Blood Glucose.: 184 mg/dL (07 Apr 2018 11:14)  POCT Blood Glucose.: 270 mg/dL (07 Apr 2018 08:03)  POCT Blood Glucose.: 162 mg/dL (06 Apr 2018 21:57)  POCT Blood Glucose.: 186 mg/dL (06 Apr 2018 16:46)        Assesment/plan  Dm- s/p DKA- good control  cont lantus 24   and humalog 4 ac tid  fsg ac and hs  compliance d/w pt
Interval Events: Patient seen and examined at bedside.   Denies chest pain, nausea, vomiting, diarrhea, cheat pain, SOB.      Allergies    amoxicillin (Rash)    Intolerances      Endocrine/Metabolic Medications:  insulin glargine Injectable (LANTUS) 30 Unit(s) SubCutaneous at bedtime  insulin lispro (HumaLOG) corrective regimen sliding scale   SubCutaneous three times a day before meals  insulin lispro (HumaLOG) corrective regimen sliding scale   SubCutaneous at bedtime  insulin lispro Injectable (HumaLOG) 4 Unit(s) SubCutaneous three times a day before meals      Vital Signs Last 24 Hrs  T(C): 36.6 (06 Apr 2018 05:10), Max: 36.8 (05 Apr 2018 15:50)  T(F): 97.9 (06 Apr 2018 05:10), Max: 98.2 (05 Apr 2018 15:50)  HR: 92 (06 Apr 2018 05:10) (89 - 103)  BP: 109/73 (06 Apr 2018 05:10) (84/64 - 138/107)  BP(mean): 74 (05 Apr 2018 21:00) (58 - 115)  RR: 16 (06 Apr 2018 05:10) (14 - 28)  SpO2: 100% (06 Apr 2018 05:10) (98% - 100%)        REVIEW OF SYSTEMS:  CONSTITUTIONAL: No fever, weight loss, or fatigue  NECK: No pain or stiffness  RESPIRATORY: No cough, wheezing, chills or hemoptysis; No Shortness of Breath  CARDIOVASCULAR: No chest pain, palpitations, passing out, dizziness, or leg swelling  GASTROINTESTINAL: No abdominal pain. No nausea, vomiting,  No diarrhea or constipation.   GENITOURINARY: No dysuria, frequency, hematuria, or incontinence  NEUROLOGICAL: No headaches, memory loss, loss of strength, numbness, or tremors  ENDOCRINE: No heat or cold intolerance;       Constitutional:  NC/AT: WNL  HEENT: WNL  Neck:  No JVD, bruits or thyromegaly  Respiratory:  Clear without rales or rhonchi  Cardiovascular:  RR without murmur, rub or gallop.  Gastrointestinal: Soft without hepatosplenomegaly.  Extremities: without cyanosis, clubbing or edema.  Neurological:  Oriented   x  3   No gross sensory or motor defects.      LABS                        8.9    5.3   )-----------( 250      ( 06 Apr 2018 07:57 )             28.6                               143    |  116    |  2                   Calcium: 7.4   / iCa: x      (04-06 @ 07:57)    ----------------------------<  83        Magnesium: 1.9                              3.2     |  19     |  0.47             Phosphorous: 2.4      TPro  5.3    /  Alb  2.2    /  TBili  0.3    /  DBili  x      /  AST  13     /  ALT  11     /  AlkPhos  65     06 Apr 2018 07:57    CAPILLARY BLOOD GLUCOSE      POCT Blood Glucose.: 78 mg/dL (06 Apr 2018 08:27)  POCT Blood Glucose.: 92 mg/dL (06 Apr 2018 02:39)  POCT Blood Glucose.: 66 mg/dL (06 Apr 2018 01:59)  POCT Blood Glucose.: 102 mg/dL (05 Apr 2018 22:24)  POCT Blood Glucose.: 95 mg/dL (05 Apr 2018 19:11)  POCT Blood Glucose.: 99 mg/dL (05 Apr 2018 18:10)  POCT Blood Glucose.: 157 mg/dL (05 Apr 2018 16:15)  POCT Blood Glucose.: 187 mg/dL (05 Apr 2018 15:02)  POCT Blood Glucose.: 559 mg/dL (05 Apr 2018 15:00)  POCT Blood Glucose.: 136 mg/dL (05 Apr 2018 13:59)  POCT Blood Glucose.: 189 mg/dL (05 Apr 2018 11:50)  POCT Blood Glucose.: 185 mg/dL (05 Apr 2018 10:01)        Assesment/plan
PGY 1 Note discussed with supervising resident and primary attending    Patient is a 32y old  Female who presents with a chief complaint of Nausea, vomiting and diarrhea (2018 14:42)      INTERVAL HPI/OVERNIGHT EVENTS: no new complaints    MEDICATIONS  (STANDING):  enoxaparin Injectable 40 milliGRAM(s) SubCutaneous daily  gabapentin 100 milliGRAM(s) Oral three times a day  insulin glargine Injectable (LANTUS) 30 Unit(s) SubCutaneous at bedtime  insulin lispro (HumaLOG) corrective regimen sliding scale   SubCutaneous three times a day before meals  insulin lispro (HumaLOG) corrective regimen sliding scale   SubCutaneous at bedtime  insulin lispro Injectable (HumaLOG) 4 Unit(s) SubCutaneous three times a day before meals  potassium acid phosphate/sodium acid phosphate tablet (K-PHOS No. 2) 1 Tablet(s) Oral four times a day with meals  potassium chloride    Tablet ER 40 milliEquivalent(s) Oral every 4 hours    MEDICATIONS  (PRN):  acetaminophen   Tablet. 650 milliGRAM(s) Oral every 6 hours PRN Severe Pain (7 - 10)  ondansetron Injectable 4 milliGRAM(s) IV Push every 8 hours PRN Nausea and/or Vomiting      __________________________________________________  REVIEW OF SYSTEMS:    CONSTITUTIONAL: No fever,   EYES: no acute visual disturbances  NECK: No pain or stiffness  RESPIRATORY: No cough; No shortness of breath  CARDIOVASCULAR: No chest pain, no palpitations  GASTROINTESTINAL: No pain. No nausea or vomiting; No diarrhea   NEUROLOGICAL: No headache or numbness, no tremors  MUSCULOSKELETAL: No joint pain, no muscle pain  GENITOURINARY: no dysuria, no frequency, no hesitancy  PSYCHIATRY: no depression , no anxiety  ALL OTHER  ROS negative        Vital Signs Last 24 Hrs  T(C): 36.6 (2018 05:10), Max: 36.8 (2018 15:50)  T(F): 97.9 (2018 05:10), Max: 98.2 (2018 15:50)  HR: 92 (2018 05:10) (89 - 103)  BP: 109/73 (2018 05:10) (91/56 - 138/107)  BP(mean): 74 (2018 21:00) (63 - 115)  RR: 16 (2018 05:10) (14 - 28)  SpO2: 100% (2018 05:10) (98% - 100%)    ________________________________________________  PHYSICAL EXAM:  GENERAL: NAD  HEENT:Normocephalic;  conjunctivae and sclerae clear; moist mucous membranes;   NECK : supple  CHEST/LUNG: Clear to auscuitation bilaterally with good air entry   HEART: S1 S2  regular; no murmurs, gallops or rubs  ABDOMEN: Soft, Nontender, Nondistended; Bowel sounds present  EXTREMITIES: no cyanosis; no edema; no calf tenderness  NERVOUS SYSTEM:  Awake and alert; Oriented  to place, person and time     _________________________________________________  LABS:                        8.9    5.3   )-----------( 250      ( 2018 07:57 )             28.6     04-06    143  |  116<H>  |  2<L>  ----------------------------<  83  3.2<L>   |  19<L>  |  0.47<L>    Ca    7.4<L>      2018 07:57  Phos  2.4     04-06  Mg     1.9     -06    TPro  5.3<L>  /  Alb  2.2<L>  /  TBili  0.3  /  DBili  x   /  AST  13  /  ALT  11  /  AlkPhos  65  04-06      Urinalysis Basic - ( 2018 15:21 )    Color: Yellow / Appearance: Clear / S.020 / pH: x  Gluc: x / Ketone: Large  / Bili: Negative / Urobili: Negative   Blood: x / Protein: 30 mg/dL / Nitrite: Negative   Leuk Esterase: Negative / RBC: x / WBC 0-2 /HPF   Sq Epi: x / Non Sq Epi: Few /HPF / Bacteria: x      CAPILLARY BLOOD GLUCOSE      POCT Blood Glucose.: 78 mg/dL (2018 08:27)  POCT Blood Glucose.: 92 mg/dL (2018 02:39)  POCT Blood Glucose.: 66 mg/dL (2018 01:59)  POCT Blood Glucose.: 102 mg/dL (2018 22:24)  POCT Blood Glucose.: 95 mg/dL (2018 19:11)  POCT Blood Glucose.: 99 mg/dL (2018 18:10)  POCT Blood Glucose.: 157 mg/dL (2018 16:15)  POCT Blood Glucose.: 187 mg/dL (2018 15:02)  POCT Blood Glucose.: 559 mg/dL (2018 15:00)  POCT Blood Glucose.: 136 mg/dL (2018 13:59)          Plan of care was discussed with patient and /or primary care giver; all questions and concerns were addressed and care was aligned with patient's wishes.

## 2018-04-09 LAB
CULTURE RESULTS: SIGNIFICANT CHANGE UP
CULTURE RESULTS: SIGNIFICANT CHANGE UP
SPECIMEN SOURCE: SIGNIFICANT CHANGE UP
SPECIMEN SOURCE: SIGNIFICANT CHANGE UP

## 2018-11-12 NOTE — H&P ADULT - PROBLEM SELECTOR PROBLEM 1
Alert and oriented to person, place, time/situation. normal mood and affect. no apparent risk to self or others.
Diabetic ketoacidosis without coma associated with type 1 diabetes mellitus

## 2019-01-01 ENCOUNTER — EMERGENCY (EMERGENCY)
Facility: HOSPITAL | Age: 34
LOS: 1 days | Discharge: HOME | End: 2019-01-01
Attending: EMERGENCY MEDICINE
Payer: MEDICAID

## 2019-01-01 VITALS
TEMPERATURE: 97 F | SYSTOLIC BLOOD PRESSURE: 132 MMHG | DIASTOLIC BLOOD PRESSURE: 91 MMHG | OXYGEN SATURATION: 100 % | RESPIRATION RATE: 16 BRPM | HEART RATE: 118 BPM

## 2019-01-01 VITALS
HEART RATE: 112 BPM | TEMPERATURE: 99 F | OXYGEN SATURATION: 100 % | SYSTOLIC BLOOD PRESSURE: 118 MMHG | RESPIRATION RATE: 16 BRPM | DIASTOLIC BLOOD PRESSURE: 78 MMHG

## 2019-01-01 DIAGNOSIS — Z98.89 OTHER SPECIFIED POSTPROCEDURAL STATES: Chronic | ICD-10-CM

## 2019-01-01 DIAGNOSIS — Z90.49 ACQUIRED ABSENCE OF OTHER SPECIFIED PARTS OF DIGESTIVE TRACT: Chronic | ICD-10-CM

## 2019-01-01 DIAGNOSIS — Z98.51 TUBAL LIGATION STATUS: Chronic | ICD-10-CM

## 2019-01-01 DIAGNOSIS — Z98.49 CATARACT EXTRACTION STATUS, UNSPECIFIED EYE: Chronic | ICD-10-CM

## 2019-01-01 PROBLEM — F32.9 MAJOR DEPRESSIVE DISORDER, SINGLE EPISODE, UNSPECIFIED: Chronic | Status: ACTIVE | Noted: 2018-04-04

## 2019-01-01 PROBLEM — J45.909 UNSPECIFIED ASTHMA, UNCOMPLICATED: Chronic | Status: ACTIVE | Noted: 2018-04-04

## 2019-01-01 LAB
ALBUMIN SERPL ELPH-MCNC: 3.7 G/DL — SIGNIFICANT CHANGE UP (ref 3.5–5)
ALP SERPL-CCNC: 167 U/L — HIGH (ref 40–120)
ALT FLD-CCNC: 36 U/L DA — SIGNIFICANT CHANGE UP (ref 10–60)
ANION GAP SERPL CALC-SCNC: 15 MMOL/L — SIGNIFICANT CHANGE UP (ref 5–17)
APPEARANCE UR: CLEAR — SIGNIFICANT CHANGE UP
AST SERPL-CCNC: 35 U/L — SIGNIFICANT CHANGE UP (ref 10–40)
B-OH-BUTYR SERPL-SCNC: 4.2 MMOL/L — HIGH
BASE EXCESS BLDV CALC-SCNC: -1.2 MMOL/L — SIGNIFICANT CHANGE UP (ref -2–2)
BASOPHILS # BLD AUTO: 0 K/UL — SIGNIFICANT CHANGE UP (ref 0–0.2)
BASOPHILS NFR BLD AUTO: 0.4 % — SIGNIFICANT CHANGE UP (ref 0–2)
BILIRUB SERPL-MCNC: 0.5 MG/DL — SIGNIFICANT CHANGE UP (ref 0.2–1.2)
BILIRUB UR-MCNC: NEGATIVE — SIGNIFICANT CHANGE UP
BUN SERPL-MCNC: 14 MG/DL — SIGNIFICANT CHANGE UP (ref 7–18)
CALCIUM SERPL-MCNC: 8.6 MG/DL — SIGNIFICANT CHANGE UP (ref 8.4–10.5)
CHLORIDE SERPL-SCNC: 98 MMOL/L — SIGNIFICANT CHANGE UP (ref 96–108)
CO2 SERPL-SCNC: 23 MMOL/L — SIGNIFICANT CHANGE UP (ref 22–31)
COLOR SPEC: YELLOW — SIGNIFICANT CHANGE UP
CREAT SERPL-MCNC: 0.65 MG/DL — SIGNIFICANT CHANGE UP (ref 0.5–1.3)
DIFF PNL FLD: ABNORMAL
EOSINOPHIL # BLD AUTO: 0 K/UL — SIGNIFICANT CHANGE UP (ref 0–0.5)
EOSINOPHIL NFR BLD AUTO: 0 % — SIGNIFICANT CHANGE UP (ref 0–6)
GLUCOSE SERPL-MCNC: 165 MG/DL — HIGH (ref 70–99)
GLUCOSE UR QL: 1000 MG/DL
HCG UR QL: NEGATIVE — SIGNIFICANT CHANGE UP
HCO3 BLDV-SCNC: 24 MMOL/L — SIGNIFICANT CHANGE UP (ref 21–29)
HCT VFR BLD CALC: 38.4 % — SIGNIFICANT CHANGE UP (ref 34.5–45)
HGB BLD-MCNC: 12.8 G/DL — SIGNIFICANT CHANGE UP (ref 11.5–15.5)
HOROWITZ INDEX BLDV+IHG-RTO: 21 — SIGNIFICANT CHANGE UP
KETONES UR-MCNC: ABNORMAL
LEUKOCYTE ESTERASE UR-ACNC: ABNORMAL
LIDOCAIN IGE QN: 38 U/L — LOW (ref 73–393)
LYMPHOCYTES # BLD AUTO: 1 K/UL — SIGNIFICANT CHANGE UP (ref 1–3.3)
LYMPHOCYTES # BLD AUTO: 9.3 % — LOW (ref 13–44)
MCHC RBC-ENTMCNC: 29.5 PG — SIGNIFICANT CHANGE UP (ref 27–34)
MCHC RBC-ENTMCNC: 33.4 GM/DL — SIGNIFICANT CHANGE UP (ref 32–36)
MCV RBC AUTO: 88.4 FL — SIGNIFICANT CHANGE UP (ref 80–100)
MONOCYTES # BLD AUTO: 0.5 K/UL — SIGNIFICANT CHANGE UP (ref 0–0.9)
MONOCYTES NFR BLD AUTO: 4.2 % — SIGNIFICANT CHANGE UP (ref 2–14)
NEUTROPHILS # BLD AUTO: 9.6 K/UL — HIGH (ref 1.8–7.4)
NEUTROPHILS NFR BLD AUTO: 86 % — HIGH (ref 43–77)
NITRITE UR-MCNC: NEGATIVE — SIGNIFICANT CHANGE UP
PCO2 BLDV: 44 MMHG — SIGNIFICANT CHANGE UP (ref 35–50)
PH BLDV: 7.35 — SIGNIFICANT CHANGE UP (ref 7.35–7.45)
PH UR: 5 — SIGNIFICANT CHANGE UP (ref 5–8)
PLATELET # BLD AUTO: 357 K/UL — SIGNIFICANT CHANGE UP (ref 150–400)
PO2 BLDV: 37 MMHG — SIGNIFICANT CHANGE UP (ref 25–45)
POTASSIUM SERPL-MCNC: 3.8 MMOL/L — SIGNIFICANT CHANGE UP (ref 3.5–5.3)
POTASSIUM SERPL-SCNC: 3.8 MMOL/L — SIGNIFICANT CHANGE UP (ref 3.5–5.3)
PROT SERPL-MCNC: 7.9 G/DL — SIGNIFICANT CHANGE UP (ref 6–8.3)
PROT UR-MCNC: 100
RBC # BLD: 4.34 M/UL — SIGNIFICANT CHANGE UP (ref 3.8–5.2)
RBC # FLD: 12.5 % — SIGNIFICANT CHANGE UP (ref 10.3–14.5)
SAO2 % BLDV: 64 % — LOW (ref 67–88)
SODIUM SERPL-SCNC: 136 MMOL/L — SIGNIFICANT CHANGE UP (ref 135–145)
SP GR SPEC: 1.02 — SIGNIFICANT CHANGE UP (ref 1.01–1.02)
UROBILINOGEN FLD QL: NEGATIVE — SIGNIFICANT CHANGE UP
WBC # BLD: 11.1 K/UL — HIGH (ref 3.8–10.5)
WBC # FLD AUTO: 11.1 K/UL — HIGH (ref 3.8–10.5)

## 2019-01-01 PROCEDURE — 96374 THER/PROPH/DIAG INJ IV PUSH: CPT

## 2019-01-01 PROCEDURE — 82962 GLUCOSE BLOOD TEST: CPT

## 2019-01-01 PROCEDURE — 82803 BLOOD GASES ANY COMBINATION: CPT

## 2019-01-01 PROCEDURE — 85027 COMPLETE CBC AUTOMATED: CPT

## 2019-01-01 PROCEDURE — 99284 EMERGENCY DEPT VISIT MOD MDM: CPT | Mod: 25

## 2019-01-01 PROCEDURE — 83690 ASSAY OF LIPASE: CPT

## 2019-01-01 PROCEDURE — 82010 KETONE BODYS QUAN: CPT

## 2019-01-01 PROCEDURE — 36415 COLL VENOUS BLD VENIPUNCTURE: CPT

## 2019-01-01 PROCEDURE — 99284 EMERGENCY DEPT VISIT MOD MDM: CPT

## 2019-01-01 PROCEDURE — 80053 COMPREHEN METABOLIC PANEL: CPT

## 2019-01-01 PROCEDURE — 81025 URINE PREGNANCY TEST: CPT

## 2019-01-01 PROCEDURE — 81001 URINALYSIS AUTO W/SCOPE: CPT

## 2019-01-01 RX ORDER — SODIUM CHLORIDE 9 MG/ML
1000 INJECTION INTRAMUSCULAR; INTRAVENOUS; SUBCUTANEOUS ONCE
Qty: 0 | Refills: 0 | Status: COMPLETED | OUTPATIENT
Start: 2019-01-01 | End: 2019-01-01

## 2019-01-01 RX ORDER — ONDANSETRON 8 MG/1
4 TABLET, FILM COATED ORAL ONCE
Qty: 0 | Refills: 0 | Status: COMPLETED | OUTPATIENT
Start: 2019-01-01 | End: 2019-01-01

## 2019-01-01 RX ADMIN — SODIUM CHLORIDE 4000 MILLILITER(S): 9 INJECTION INTRAMUSCULAR; INTRAVENOUS; SUBCUTANEOUS at 14:05

## 2019-01-01 RX ADMIN — ONDANSETRON 4 MILLIGRAM(S): 8 TABLET, FILM COATED ORAL at 14:05

## 2019-01-01 NOTE — ED PROVIDER NOTE - OBJECTIVE STATEMENT
32 y/o F pt with PMHx of DM, compliant with insulin, presents to ED c/o multiple episodes of vomiting since last night. Pt reports that she is unable to keep down solid foods or liquids and feels dehydrated so presented to ED. Pt reports chills but denies fever or abdominal pain. LMP last week.

## 2019-01-01 NOTE — ED ADULT NURSE NOTE - NSIMPLEMENTINTERV_GEN_ALL_ED
Implemented All Universal Safety Interventions:  [place] to call system. Call bell, personal items and telephone within reach. Instruct patient to call for assistance. Room bathroom lighting operational. Non-slip footwear when patient is off stretcher. Physically safe environment: no spills, clutter or unnecessary equipment. Stretcher in lowest position, wheels locked, appropriate side rails in place.

## 2019-01-01 NOTE — ED PROVIDER NOTE - PROGRESS NOTE DETAILS
pt feels better. tolerating po. discussed result and hr. pt shares that she has had unexplained high HR  for several months. saw cardiologist, wore holter from dec 20-24th 2018. has cardiology follow up in 4 days. wants to go home. has no cp or sob or history of thyroid problems. gave copy of results. discussed strict return precautions. pt feels better. tolerating po. discussed result and hr. pt shares that she has had unexplained high HR  for several months. saw cardiologist, wore holter from dec 20-24th 2018. has cardiology follow up in 4 days. wants to go home. has no cp or sob or history of thyroid problems. pt known to be tachycardic. explained implications, offered admission, pt declined, really wants to go home.    gave copy of results. discussed strict return precautions. agrees to return if anything changes, develops trouble breathing or any concerning sx.

## 2019-04-28 ENCOUNTER — INPATIENT (INPATIENT)
Facility: HOSPITAL | Age: 34
LOS: 1 days | Discharge: HOME | DRG: 638 | End: 2019-04-30
Attending: HOSPITALIST | Admitting: HOSPITALIST
Payer: MEDICAID

## 2019-04-28 VITALS
TEMPERATURE: 98 F | RESPIRATION RATE: 16 BRPM | HEART RATE: 122 BPM | OXYGEN SATURATION: 100 % | WEIGHT: 102.96 LBS | HEIGHT: 59 IN | DIASTOLIC BLOOD PRESSURE: 80 MMHG | SYSTOLIC BLOOD PRESSURE: 130 MMHG

## 2019-04-28 DIAGNOSIS — Z98.89 OTHER SPECIFIED POSTPROCEDURAL STATES: Chronic | ICD-10-CM

## 2019-04-28 DIAGNOSIS — E10.11 TYPE 1 DIABETES MELLITUS WITH KETOACIDOSIS WITH COMA: ICD-10-CM

## 2019-04-28 DIAGNOSIS — J45.909 UNSPECIFIED ASTHMA, UNCOMPLICATED: ICD-10-CM

## 2019-04-28 DIAGNOSIS — Z98.49 CATARACT EXTRACTION STATUS, UNSPECIFIED EYE: Chronic | ICD-10-CM

## 2019-04-28 DIAGNOSIS — E11.10 TYPE 2 DIABETES MELLITUS WITH KETOACIDOSIS WITHOUT COMA: ICD-10-CM

## 2019-04-28 DIAGNOSIS — G62.9 POLYNEUROPATHY, UNSPECIFIED: ICD-10-CM

## 2019-04-28 DIAGNOSIS — Z90.49 ACQUIRED ABSENCE OF OTHER SPECIFIED PARTS OF DIGESTIVE TRACT: Chronic | ICD-10-CM

## 2019-04-28 DIAGNOSIS — Z29.9 ENCOUNTER FOR PROPHYLACTIC MEASURES, UNSPECIFIED: ICD-10-CM

## 2019-04-28 DIAGNOSIS — Z98.51 TUBAL LIGATION STATUS: Chronic | ICD-10-CM

## 2019-04-28 LAB
ACETONE SERPL-MCNC: ABNORMAL
ALBUMIN SERPL ELPH-MCNC: 4.2 G/DL — SIGNIFICANT CHANGE UP (ref 3.5–5)
ALP SERPL-CCNC: 106 U/L — SIGNIFICANT CHANGE UP (ref 40–120)
ALT FLD-CCNC: 32 U/L DA — SIGNIFICANT CHANGE UP (ref 10–60)
ANION GAP SERPL CALC-SCNC: 14 MMOL/L — SIGNIFICANT CHANGE UP (ref 5–17)
ANION GAP SERPL CALC-SCNC: 18 MMOL/L — HIGH (ref 5–17)
APPEARANCE UR: CLEAR — SIGNIFICANT CHANGE UP
AST SERPL-CCNC: 19 U/L — SIGNIFICANT CHANGE UP (ref 10–40)
BASE EXCESS BLDV CALC-SCNC: -6.9 MMOL/L — LOW (ref -2–2)
BASE EXCESS BLDV CALC-SCNC: -9.9 MMOL/L — LOW (ref -2–2)
BASOPHILS # BLD AUTO: 0.01 K/UL — SIGNIFICANT CHANGE UP (ref 0–0.2)
BASOPHILS NFR BLD AUTO: 0.1 % — SIGNIFICANT CHANGE UP (ref 0–2)
BILIRUB SERPL-MCNC: 1.1 MG/DL — SIGNIFICANT CHANGE UP (ref 0.2–1.2)
BILIRUB UR-MCNC: NEGATIVE — SIGNIFICANT CHANGE UP
BUN SERPL-MCNC: 13 MG/DL — SIGNIFICANT CHANGE UP (ref 7–18)
BUN SERPL-MCNC: 16 MG/DL — SIGNIFICANT CHANGE UP (ref 7–18)
CALCIUM SERPL-MCNC: 8.1 MG/DL — LOW (ref 8.4–10.5)
CALCIUM SERPL-MCNC: 9.5 MG/DL — SIGNIFICANT CHANGE UP (ref 8.4–10.5)
CHLORIDE SERPL-SCNC: 101 MMOL/L — SIGNIFICANT CHANGE UP (ref 96–108)
CHLORIDE SERPL-SCNC: 108 MMOL/L — SIGNIFICANT CHANGE UP (ref 96–108)
CO2 SERPL-SCNC: 17 MMOL/L — LOW (ref 22–31)
CO2 SERPL-SCNC: 18 MMOL/L — LOW (ref 22–31)
COLOR SPEC: YELLOW — SIGNIFICANT CHANGE UP
CREAT SERPL-MCNC: 0.83 MG/DL — SIGNIFICANT CHANGE UP (ref 0.5–1.3)
CREAT SERPL-MCNC: 1.2 MG/DL — SIGNIFICANT CHANGE UP (ref 0.5–1.3)
DIFF PNL FLD: ABNORMAL
EOSINOPHIL # BLD AUTO: 0 K/UL — SIGNIFICANT CHANGE UP (ref 0–0.5)
EOSINOPHIL NFR BLD AUTO: 0 % — SIGNIFICANT CHANGE UP (ref 0–6)
GLUCOSE BLDC GLUCOMTR-MCNC: 148 MG/DL — HIGH (ref 70–99)
GLUCOSE BLDC GLUCOMTR-MCNC: 225 MG/DL — HIGH (ref 70–99)
GLUCOSE BLDC GLUCOMTR-MCNC: 301 MG/DL — HIGH (ref 70–99)
GLUCOSE BLDC GLUCOMTR-MCNC: 377 MG/DL — HIGH (ref 70–99)
GLUCOSE SERPL-MCNC: 306 MG/DL — HIGH (ref 70–99)
GLUCOSE SERPL-MCNC: 481 MG/DL — CRITICAL HIGH (ref 70–99)
GLUCOSE UR QL: 1000 MG/DL
HCG SERPL-ACNC: <1 MIU/ML — SIGNIFICANT CHANGE UP
HCO3 BLDV-SCNC: 17 MMOL/L — LOW (ref 21–29)
HCO3 BLDV-SCNC: 19 MMOL/L — LOW (ref 21–29)
HCT VFR BLD CALC: 39.5 % — SIGNIFICANT CHANGE UP (ref 34.5–45)
HGB BLD-MCNC: 12.7 G/DL — SIGNIFICANT CHANGE UP (ref 11.5–15.5)
HOROWITZ INDEX BLDV+IHG-RTO: 21 — SIGNIFICANT CHANGE UP
HOROWITZ INDEX BLDV+IHG-RTO: 21 — SIGNIFICANT CHANGE UP
IMM GRANULOCYTES NFR BLD AUTO: 0.2 % — SIGNIFICANT CHANGE UP (ref 0–1.5)
KETONES UR-MCNC: ABNORMAL
LEUKOCYTE ESTERASE UR-ACNC: ABNORMAL
LYMPHOCYTES # BLD AUTO: 0.75 K/UL — LOW (ref 1–3.3)
LYMPHOCYTES # BLD AUTO: 8.1 % — LOW (ref 13–44)
MCHC RBC-ENTMCNC: 28.8 PG — SIGNIFICANT CHANGE UP (ref 27–34)
MCHC RBC-ENTMCNC: 32.2 GM/DL — SIGNIFICANT CHANGE UP (ref 32–36)
MCV RBC AUTO: 89.6 FL — SIGNIFICANT CHANGE UP (ref 80–100)
MONOCYTES # BLD AUTO: 0.16 K/UL — SIGNIFICANT CHANGE UP (ref 0–0.9)
MONOCYTES NFR BLD AUTO: 1.7 % — LOW (ref 2–14)
NEUTROPHILS # BLD AUTO: 8.33 K/UL — HIGH (ref 1.8–7.4)
NEUTROPHILS NFR BLD AUTO: 89.9 % — HIGH (ref 43–77)
NITRITE UR-MCNC: NEGATIVE — SIGNIFICANT CHANGE UP
NRBC # BLD: 0 /100 WBCS — SIGNIFICANT CHANGE UP (ref 0–0)
PCO2 BLDV: 40 MMHG — SIGNIFICANT CHANGE UP (ref 35–50)
PCO2 BLDV: 45 MMHG — SIGNIFICANT CHANGE UP (ref 35–50)
PH BLDV: 7.21 — LOW (ref 7.35–7.45)
PH BLDV: 7.29 — LOW (ref 7.35–7.45)
PH UR: 5 — SIGNIFICANT CHANGE UP (ref 5–8)
PLATELET # BLD AUTO: 318 K/UL — SIGNIFICANT CHANGE UP (ref 150–400)
PO2 BLDV: 23 MMHG — LOW (ref 25–45)
PO2 BLDV: 57 MMHG — HIGH (ref 25–45)
POTASSIUM SERPL-MCNC: 4 MMOL/L — SIGNIFICANT CHANGE UP (ref 3.5–5.3)
POTASSIUM SERPL-MCNC: 4.9 MMOL/L — SIGNIFICANT CHANGE UP (ref 3.5–5.3)
POTASSIUM SERPL-SCNC: 4 MMOL/L — SIGNIFICANT CHANGE UP (ref 3.5–5.3)
POTASSIUM SERPL-SCNC: 4.9 MMOL/L — SIGNIFICANT CHANGE UP (ref 3.5–5.3)
PROT SERPL-MCNC: 8.5 G/DL — HIGH (ref 6–8.3)
PROT UR-MCNC: 30 MG/DL
RBC # BLD: 4.41 M/UL — SIGNIFICANT CHANGE UP (ref 3.8–5.2)
RBC # FLD: 13 % — SIGNIFICANT CHANGE UP (ref 10.3–14.5)
SAO2 % BLDV: 27 % — LOW (ref 67–88)
SAO2 % BLDV: 86 % — SIGNIFICANT CHANGE UP (ref 67–88)
SODIUM SERPL-SCNC: 137 MMOL/L — SIGNIFICANT CHANGE UP (ref 135–145)
SODIUM SERPL-SCNC: 139 MMOL/L — SIGNIFICANT CHANGE UP (ref 135–145)
SP GR SPEC: 1.02 — SIGNIFICANT CHANGE UP (ref 1.01–1.02)
UROBILINOGEN FLD QL: NEGATIVE — SIGNIFICANT CHANGE UP
WBC # BLD: 9.27 K/UL — SIGNIFICANT CHANGE UP (ref 3.8–10.5)
WBC # FLD AUTO: 9.27 K/UL — SIGNIFICANT CHANGE UP (ref 3.8–10.5)

## 2019-04-28 PROCEDURE — 93010 ELECTROCARDIOGRAM REPORT: CPT

## 2019-04-28 PROCEDURE — 71045 X-RAY EXAM CHEST 1 VIEW: CPT | Mod: 26

## 2019-04-28 PROCEDURE — 99291 CRITICAL CARE FIRST HOUR: CPT

## 2019-04-28 PROCEDURE — 99223 1ST HOSP IP/OBS HIGH 75: CPT

## 2019-04-28 PROCEDURE — 74177 CT ABD & PELVIS W/CONTRAST: CPT | Mod: 26

## 2019-04-28 RX ORDER — SODIUM CHLORIDE 9 MG/ML
1000 INJECTION, SOLUTION INTRAVENOUS ONCE
Qty: 0 | Refills: 0 | Status: COMPLETED | OUTPATIENT
Start: 2019-04-28 | End: 2019-04-28

## 2019-04-28 RX ORDER — INSULIN LISPRO 100/ML
VIAL (ML) SUBCUTANEOUS
Qty: 0 | Refills: 0 | Status: DISCONTINUED | OUTPATIENT
Start: 2019-04-28 | End: 2019-04-30

## 2019-04-28 RX ORDER — SODIUM CHLORIDE 9 MG/ML
1000 INJECTION, SOLUTION INTRAVENOUS
Qty: 0 | Refills: 0 | Status: DISCONTINUED | OUTPATIENT
Start: 2019-04-28 | End: 2019-04-30

## 2019-04-28 RX ORDER — DEXTROSE 50 % IN WATER 50 %
12.5 SYRINGE (ML) INTRAVENOUS ONCE
Qty: 0 | Refills: 0 | Status: DISCONTINUED | OUTPATIENT
Start: 2019-04-28 | End: 2019-04-30

## 2019-04-28 RX ORDER — POTASSIUM CHLORIDE 20 MEQ
40 PACKET (EA) ORAL ONCE
Qty: 0 | Refills: 0 | Status: COMPLETED | OUTPATIENT
Start: 2019-04-28 | End: 2019-04-28

## 2019-04-28 RX ORDER — DEXTROSE 50 % IN WATER 50 %
15 SYRINGE (ML) INTRAVENOUS ONCE
Qty: 0 | Refills: 0 | Status: DISCONTINUED | OUTPATIENT
Start: 2019-04-28 | End: 2019-04-30

## 2019-04-28 RX ORDER — DEXTROSE 50 % IN WATER 50 %
25 SYRINGE (ML) INTRAVENOUS ONCE
Qty: 0 | Refills: 0 | Status: DISCONTINUED | OUTPATIENT
Start: 2019-04-28 | End: 2019-04-30

## 2019-04-28 RX ORDER — IPRATROPIUM/ALBUTEROL SULFATE 18-103MCG
3 AEROSOL WITH ADAPTER (GRAM) INHALATION EVERY 6 HOURS
Qty: 0 | Refills: 0 | Status: DISCONTINUED | OUTPATIENT
Start: 2019-04-28 | End: 2019-04-30

## 2019-04-28 RX ORDER — SODIUM CHLORIDE 9 MG/ML
1000 INJECTION, SOLUTION INTRAVENOUS
Qty: 0 | Refills: 0 | Status: DISCONTINUED | OUTPATIENT
Start: 2019-04-28 | End: 2019-04-28

## 2019-04-28 RX ORDER — SODIUM CHLORIDE 9 MG/ML
1000 INJECTION, SOLUTION INTRAVENOUS
Qty: 0 | Refills: 0 | Status: DISCONTINUED | OUTPATIENT
Start: 2019-04-28 | End: 2019-04-29

## 2019-04-28 RX ORDER — ONDANSETRON 8 MG/1
4 TABLET, FILM COATED ORAL EVERY 4 HOURS
Qty: 0 | Refills: 0 | Status: DISCONTINUED | OUTPATIENT
Start: 2019-04-28 | End: 2019-04-30

## 2019-04-28 RX ORDER — GLUCAGON INJECTION, SOLUTION 0.5 MG/.1ML
1 INJECTION, SOLUTION SUBCUTANEOUS ONCE
Qty: 0 | Refills: 0 | Status: DISCONTINUED | OUTPATIENT
Start: 2019-04-28 | End: 2019-04-30

## 2019-04-28 RX ORDER — INSULIN HUMAN 100 [IU]/ML
15 INJECTION, SOLUTION SUBCUTANEOUS ONCE
Qty: 0 | Refills: 0 | Status: COMPLETED | OUTPATIENT
Start: 2019-04-28 | End: 2019-04-28

## 2019-04-28 RX ORDER — PANTOPRAZOLE SODIUM 20 MG/1
40 TABLET, DELAYED RELEASE ORAL DAILY
Qty: 0 | Refills: 0 | Status: DISCONTINUED | OUTPATIENT
Start: 2019-04-28 | End: 2019-04-30

## 2019-04-28 RX ORDER — SODIUM CHLORIDE 9 MG/ML
1000 INJECTION INTRAMUSCULAR; INTRAVENOUS; SUBCUTANEOUS ONCE
Qty: 0 | Refills: 0 | Status: DISCONTINUED | OUTPATIENT
Start: 2019-04-28 | End: 2019-04-28

## 2019-04-28 RX ORDER — CEFTRIAXONE 500 MG/1
1 INJECTION, POWDER, FOR SOLUTION INTRAMUSCULAR; INTRAVENOUS ONCE
Qty: 0 | Refills: 0 | Status: COMPLETED | OUTPATIENT
Start: 2019-04-28 | End: 2019-04-28

## 2019-04-28 RX ORDER — INSULIN GLARGINE 100 [IU]/ML
40 INJECTION, SOLUTION SUBCUTANEOUS AT BEDTIME
Qty: 0 | Refills: 0 | Status: DISCONTINUED | OUTPATIENT
Start: 2019-04-28 | End: 2019-04-28

## 2019-04-28 RX ORDER — SODIUM CHLORIDE 9 MG/ML
2000 INJECTION, SOLUTION INTRAVENOUS ONCE
Qty: 0 | Refills: 0 | Status: COMPLETED | OUTPATIENT
Start: 2019-04-28 | End: 2019-04-28

## 2019-04-28 RX ORDER — INSULIN HUMAN 100 [IU]/ML
5 INJECTION, SOLUTION SUBCUTANEOUS
Qty: 100 | Refills: 0 | Status: DISCONTINUED | OUTPATIENT
Start: 2019-04-28 | End: 2019-04-28

## 2019-04-28 RX ADMIN — ONDANSETRON 4 MILLIGRAM(S): 8 TABLET, FILM COATED ORAL at 15:40

## 2019-04-28 RX ADMIN — ONDANSETRON 4 MILLIGRAM(S): 8 TABLET, FILM COATED ORAL at 22:42

## 2019-04-28 RX ADMIN — SODIUM CHLORIDE 3000 MILLILITER(S): 9 INJECTION, SOLUTION INTRAVENOUS at 16:00

## 2019-04-28 RX ADMIN — CEFTRIAXONE 100 GRAM(S): 500 INJECTION, POWDER, FOR SOLUTION INTRAMUSCULAR; INTRAVENOUS at 16:12

## 2019-04-28 RX ADMIN — SODIUM CHLORIDE 150 MILLILITER(S): 9 INJECTION, SOLUTION INTRAVENOUS at 16:12

## 2019-04-28 RX ADMIN — INSULIN HUMAN 5 UNIT(S)/HR: 100 INJECTION, SOLUTION SUBCUTANEOUS at 16:13

## 2019-04-28 RX ADMIN — PANTOPRAZOLE SODIUM 40 MILLIGRAM(S): 20 TABLET, DELAYED RELEASE ORAL at 16:11

## 2019-04-28 RX ADMIN — SODIUM CHLORIDE 4000 MILLILITER(S): 9 INJECTION, SOLUTION INTRAVENOUS at 14:53

## 2019-04-28 RX ADMIN — Medication 40 MILLIEQUIVALENT(S): at 16:11

## 2019-04-28 RX ADMIN — INSULIN HUMAN 15 UNIT(S): 100 INJECTION, SOLUTION SUBCUTANEOUS at 16:26

## 2019-04-28 NOTE — ED PROVIDER NOTE - CRITICAL CARE PROVIDED
additional history taking/interpretation of diagnostic studies/documentation/direct patient care (not related to procedure)/consultation with other physicians/consult w/ pt's family directly relating to pts condition

## 2019-04-28 NOTE — H&P ADULT - NSHPPHYSICALEXAM_GEN_ALL_CORE
Vital Signs Last 24 Hrs  T(C): 37.6 (28 Apr 2019 21:34), Max: 37.6 (28 Apr 2019 21:34)  T(F): 99.6 (28 Apr 2019 21:34), Max: 99.6 (28 Apr 2019 21:34)  HR: 120 (28 Apr 2019 21:34) (120 - 122)  BP: 95/58 (28 Apr 2019 21:34) (95/58 - 130/80)  RR: 17 (28 Apr 2019 21:34) (16 - 17)  SpO2: 99% (28 Apr 2019 21:34) (99% - 100%)\  .  GENERAL: Well developed, Young female, vomiting resolved   HEENT:  Normocephalic/Atraumatic, reactive light reflex, moist mucous membranes  NECK: Supple, no JVD  RESP: Symmetric movement of the chest, clear to auscultation bilaterally, no wheeze, no rhonchi, no crackles noted  CVS: Tachycardia, S1 and S2 audible, no murmur, rubs or gallops noted  GI: Normal active bowel sounds present, abdomen soft, non tender, scar marks   EXTREMITIES:  No edema, no clubbing, cyanosis  MSK: 4/5 strength bilateral upper and lower extremities, intact sensations to light & crude touch  PSYCH: Normal mood, depress    NEURO: Alert and oriented x 3

## 2019-04-28 NOTE — H&P ADULT - NSICDXPASTSURGICALHX_GEN_ALL_CORE_FT
PAST SURGICAL HISTORY:  H/O colonoscopy     H/O tubal ligation     History of      Insulin pump in place     S/P cataract surgery     S/P cholecystectomy     S/P endoscopy

## 2019-04-28 NOTE — CONSULT NOTE ADULT - PROBLEM SELECTOR PROBLEM 1
Type 1 diabetes mellitus with ketoacidotic coma Diabetes mellitus with ketoacidosis without coma, with long-term current use of insulin

## 2019-04-28 NOTE — CONSULT NOTE ADULT - SUBJECTIVE AND OBJECTIVE BOX
33 yo female from home lives with 2 children. PMH Type 1 DM (for the past 14 years), asthma, HLD, IBS, gastritis, depression, s/p , tubal ligation, cholecystectomy, acne (on abx) and bilateral cataracts. Pt not able to  speak  and actively vomiting . patient says that she was having diarrhea and poor po  intake for last  few days and but says she was complaint with insulin  . patient denies any  shortness of breath  but c/o  diffuse abdominal  pain and chest  pain from vomiting and denies any  other acute complaints {  limited HPI  as patient actively vomiting }    IN the E.D patient vitals are b.p  130/80  and hr-120  and rr - 16  and spo2- 100     MEDICATIONS  (STANDING):  cefTRIAXone   IVPB 1 Gram(s) IV Intermittent Once  insulin glargine Injectable (LANTUS) 40 Unit(s) SubCutaneous at bedtime  insulin Infusion 5 Unit(s)/Hr (5 mL/Hr) IV Continuous <Continuous>  lactated ringers 1000 milliLiter(s) (150 mL/Hr) IV Continuous <Continuous>  pantoprazole  Injectable 40 milliGRAM(s) IV Push daily  potassium chloride    Tablet ER 40 milliEquivalent(s) Oral once    MEDICATIONS  (PRN):  ondansetron Injectable 4 milliGRAM(s) IV Push every 4 hours PRN Nausea and/or Vomiting      Allergies    amoxicillin (Rash)    Intolerances        REVIEW OF SYSTEMS:  CONSTITUTIONAL: No fever, weight loss, or fatigue  RESPIRATORY: No cough, wheezing, chills or hemoptysis; No shortness of breath  CARDIOVASCULAR: No chest pain, palpitations, dizziness, or leg swelling  GASTROINTESTINAL: abdominal  pain+  nausea, vomiting, ++; diarrhea ++  NEUROLOGICAL: No headaches, memory loss, loss of strength, numbness, or tremors  SKIN: No itching, burning, rashes, or lesions     Vital Signs Last 24 Hrs  T(C): 36.7 (2019 12:42), Max: 36.7 (2019 12:42)  T(F): 98 (2019 12:42), Max: 98 (2019 12:42)  HR: 122 (2019 12:42) (122 - 122)  BP: 130/80 (2019 12:42) (130/80 - 130/80)  BP(mean): --  RR: 16 (2019 12:42) (16 - 16)  SpO2: 100% (2019 12:42) (100% - 100%)    PHYSICAL EXAM:  GENERAL: mild distress due to  abdominal  pain   HEAD:  Atraumatic, Normocephalic  EYES: EOMI, PERRLA, conjunctiva and sclera clear  NECK: Supple, No JVD, Normal thyroid  CHEST/LUNG: Clear to percussion bilaterally; No rales, rhonchi, wheezing, or rubs  HEART: Regular rate and rhythm; No murmurs, rubs, or gallops  ABDOMEN: Soft, diffuse tenderness + , Nondistended; Bowel sounds present  NERVOUS SYSTEM:  Alert & Oriented X3, Good concentration; Motor Strength 5/5 B/L   EXTREMITIES:  2+ Peripheral Pulses, No clubbing, cyanosis, or edema  SKIN;    LABS:                        12.7   9.27  )-----------( 318      ( 2019 13:19 )             39.5         137  |  101  |  16  ----------------------------<  481<HH>  4.9   |  18<L>  |  1.20    Ca    9.5      2019 13:19    TPro  8.5<H>  /  Alb  4.2  /  TBili  1.1  /  DBili  x   /  AST  19  /  ALT  32  /  AlkPhos  106        Urinalysis Basic - ( 2019 14:37 )    Color: Yellow / Appearance: Clear / S.020 / pH: x  Gluc: x / Ketone: Large  / Bili: Negative / Urobili: Negative   Blood: x / Protein: 30 mg/dL / Nitrite: Negative   Leuk Esterase: Moderate / RBC: 5-10 /HPF / WBC >50 /HPF   Sq Epi: x / Non Sq Epi: Many /HPF / Bacteria: Many /HPF      CAPILLARY BLOOD GLUCOSE      POCT Blood Glucose.: 417 mg/dL (2019 12:46)      RADIOLOGY & ADDITIONAL TESTS:    Imaging Personally Reviewed:  cxr  :  No acute finding. Clips in the right upper quadrant again   noted.

## 2019-04-28 NOTE — H&P ADULT - PROBLEM SELECTOR PLAN 1
Abdominal pain and Vomiting  Anion gap metabolic acidosis with Glu of 480  Likely due to UTI and dietary non-compliance   S/p Insulin drip and 3 Lit bolus in ED  C/w IV fluids @ 150cc  Ordered Lantus 20 and HSS Abdominal pain and Vomiting  Anion gap metabolic acidosis with Glu of 480  Likely due to UTI and dietary non-compliance   S/p Insulin drip and 3 Lit bolus in ED  C/w IV fluids @ 150cc  Ordered Lantus 20 and HSS  Advance diet to Clears Abdominal pain and Vomiting  Anion gap metabolic acidosis with Glu of 480  Likely due to UTI and dietary non-compliance   S/p Insulin drip and 3 Lit bolus in ED  C/w IV fluids @ 150cc  Ordered Lantus 20 and HSS  Advance diet to Clears  Consulted Dr Peralta

## 2019-04-28 NOTE — ED PROVIDER NOTE - OBJECTIVE STATEMENT
33 y/o F pt with PMHx of DM (type 1.5), asthma, depression, GERD, HSV, and neuropathy presents to ED c/o abd pain, nausea, and vomiting x today. Pt describes pain as constant, moderate and diffused. Pt reports taking her insulin yesterday, but did not take this morning's dose of long acting insulin. Pt takes 40 of long acting insulin daily and 15 at each meal. Patient denies fever, chills, urinary symptoms, or any other complaints.

## 2019-04-28 NOTE — ED PROVIDER NOTE - CLINICAL SUMMARY MEDICAL DECISION MAKING FREE TEXT BOX
33 y/o diabetic F pt with abd pain, nausea, and vomiting. Pt with benign abd exam; likely will not require a CT scan. Will do basic blood including acetone, pH, and determine if pt will require CT. Pt will likely have DKA and will require intensive care.

## 2019-04-28 NOTE — H&P ADULT - NSICDXPASTMEDICALHX_GEN_ALL_CORE_FT
PAST MEDICAL HISTORY:  Asthma, unspecified asthma severity, unspecified whether complicated, unspecified whether persistent     Depression, unspecified depression type     Diabetes mellitus type 1     GERD (gastroesophageal reflux disease)     HSV-1 infection     HSV-2 infection     Insulin dependent diabetes mellitus     Neuropathy

## 2019-04-28 NOTE — H&P ADULT - ASSESSMENT
34 Female, from shelter having PMH of Type 1 DM (for the past 15 years), Asthma, HLD, IBS, Gastritis, Depression, s/p , tubal ligation, cholecystectomy, acne and bilateral cataracts came to hospital for diffuse abdominal pain and diarrhea for past one day. Admitted to medicine for DKA and UTI.

## 2019-04-28 NOTE — CONSULT NOTE ADULT - ASSESSMENT
33 yo female from home lives with 2 children. PMH Type 1 DM (for the past 14 years), asthma, HLD, IBS, gastritis, depression, s/p , tubal ligation, cholecystectomy, acne (on abx) and bilateral cataracts. Pt not able to  speak  and actively vomiting . patient says that she was having diarrhea and poor po  intake for last  few days and but says she was complaint with insulin  . patient denies any  shortness of breath  but c/o  diffuse abdominal  pain and chest  pain from vomiting and denies any  other acute complaints {  limited HPI  as patient actively vomiting }    IN the E.D patient vitals are b.p  130/80  and hr-120  and rr - 16  and spo2- 100

## 2019-04-28 NOTE — H&P ADULT - HISTORY OF PRESENT ILLNESS
34 Female, from shelter having PMH of Type 1 DM (for the past 15 years), Asthma, HLD, IBS, Gastritis, Depression, s/p , tubal ligation, cholecystectomy, acne and bilateral cataracts came to hospital for diffuse abdominal pain and diarrhea for past one day.  Symptoms were associated with NBNB vomiting and unable to eat anything. Pt states she was last admitted in ICU four months ago for similar reason and chest pain. She seems complaint with her insulin regimen (40 Lantus and 15 pre-meals) but has not been able to eat properly because she lives in shelter. Last saw her PMD few days ago and her HbA1c was 10. One of her kid is pre-diabetic. No chest pain, shortness of breath, problems with urine or bowel.    SH: Lives in shelter, Has two kids. Smokes weed and drinks alcohol socially.     ED Course: Tachycardic and hypotensive to 95/58. Labs showed Anion gap metabolic acidosis, small acetone. UA was infected and CT abd showed cystitis and hepatic steatosis. Pt was started on Insulin drip and 2 lit bolus. Anion gap was closed and ICU downgraded the patient. 34 Female, from shelter having PMH of Type 1 DM (for the past 15 years), Asthma, HLD, IBS, Gastritis, Depression, s/p , tubal ligation, cholecystectomy, acne and bilateral cataracts came to hospital for diffuse abdominal pain and diarrhea for past one day.  Symptoms were associated with NBNB vomiting and unable to eat anything. Pt states she was last admitted in ICU four months ago for similar reason and chest pain. She seems complaint with her insulin regimen (40 Lantus and 15 pre-meals) but has not been able to eat properly because she lives in shelter. Last saw her PMD few days ago and her HbA1c was 10. One of her kid is pre-diabetic. No chest pain, shortness of breath, problems with urine or bowel.    SH: , lives in shelter, Has two kids. Smokes weed and drinks alcohol socially.  FH: Diabetes in mother.     ED Course: Tachycardic and hypotensive to 95/58. Labs showed Anion gap metabolic acidosis, small acetone. UA was infected and CT abd showed cystitis and hepatic steatosis. Pt was started on Insulin drip and 2 lit bolus. Anion gap was closed and ICU downgraded the patient. 34 Female, from shelter having PMH of Type 1 DM (for the past 15 years), Asthma, HLD, IBS, Gastritis, Depression, s/p , tubal ligation, cholecystectomy, acne and bilateral cataracts came to hospital for diffuse abdominal pain and diarrhea for past one day.  Symptoms were associated with NBNB vomiting and unable to eat anything. Pt states she was last admitted in ICU four months ago for similar reason and chest pain. She seems complaint with her insulin regimen (40 Lantus and 15 pre-meals) but has not been able to eat properly because she lives in shelter. Last saw her PMD few days ago and her HbA1c was 10. One of her kid is pre-diabetic. No chest pain, shortness of breath, problems with urine or bowel.    SH: , lives in shelter, Has two kids. Smokes weed and drinks alcohol socially.  FH: Diabetes in mother.     ED Course: Tachycardic and hypotensive to 95/58. Labs showed Anion gap metabolic acidosis, small acetone. UA was infected and CT abd showed cystitis and hepatic steatosis. Pt was started on Insulin drip and 3 lit bolus was given. Anion gap was closed and ICU downgraded the patient. EKG showed sinus tachycardia.

## 2019-04-28 NOTE — H&P ADULT - ATTENDING COMMENTS
ICU Vital Signs Last 24 Hrs  T(C): 37.6 (28 Apr 2019 21:34), Max: 37.6 (28 Apr 2019 21:34)  T(F): 99.6 (28 Apr 2019 21:34), Max: 99.6 (28 Apr 2019 21:34)  HR: 120 (28 Apr 2019 21:34) (120 - 122)  BP: 95/58 (28 Apr 2019 21:34) (95/58 - 130/80)  BP(mean): --  ABP: --  ABP(mean): --  RR: 17 (28 Apr 2019 21:34) (16 - 17)  SpO2: 99% (28 Apr 2019 21:34) (99% - 100%) Patient seen and examined ; case was discussed with the admitting resident    ROS: as in the HPI; all other ROS negative    SH and family history as above    Vital Signs Last 24 Hrs  T(C): 37 (2019 00:47), Max: 37.6 (2019 21:34)  T(F): 98.6 (2019 00:47), Max: 99.6 (2019 21:34)  HR: 111 (2019 00:47) (111 - 122)  BP: 107/65 (2019 00:47) (95/58 - 130/80)  BP(mean): --  RR: 18 (2019 00:47) (16 - 18)  SpO2: 100% (2019 00:47) (99% - 100%)    GEN: NAD  HEENT- normocephalic; mouth dry   CVS- S1S2+, tachycardia   LUNGS- clear to auscultation; no wheezing  ABD: Soft , nontender, nondistended, Bowel sounds are present  EXTREMITY: no calf tenderness, no cyanosis, no edema  NEURO: AAOx3; non focal neurologic exam; cranial nerves grossly intact  PSYCH: normal affect and behavior  VASCULAR: ++ distal peripheral pulses  SKIN: warm and dry.       Labs Reviewed:                         12.7   9.27  )-----------( 318      ( 2019 13:19 )             39.5     04-29    136  |  107  |  12  ----------------------------<  315<H>  4.8   |  20<L>  |  0.69    Ca    7.8<L>      2019 01:42    TPro  8.5<H>  /  Alb  4.2  /  TBili  1.1  /  DBili  x   /  AST  19  /  ALT  32  /  AlkPhos  106  04-28    CARDIAC MARKERS ( 2019 01:42 )  <0.015 ng/mL / x     / 99 U/L / x     / 1.2 ng/mL      Urinalysis Basic - ( 2019 14:37 )    Color: Yellow / Appearance: Clear / S.020 / pH: x  Gluc: x / Ketone: Large  / Bili: Negative / Urobili: Negative   Blood: x / Protein: 30 mg/dL / Nitrite: Negative   Leuk Esterase: Moderate / RBC: 5-10 /HPF / WBC >50 /HPF   Sq Epi: x / Non Sq Epi: Many /HPF / Bacteria: Many /HPF        BNP:   MEDICATIONS  (STANDING):  ALBUTerol/ipratropium for Nebulization 3 milliLiter(s) Nebulizer every 6 hours  cefTRIAXone   IVPB 1 Gram(s) IV Intermittent every 24 hours  dextrose 50% Injectable 12.5 Gram(s) IV Push once  dextrose 50% Injectable 25 Gram(s) IV Push once  dextrose 50% Injectable 25 Gram(s) IV Push once  insulin glargine Injectable (LANTUS) 20 Unit(s) SubCutaneous at bedtime  insulin lispro (HumaLOG) corrective regimen sliding scale   SubCutaneous Before meals and at bedtime  lactated ringers. 1000 milliLiter(s) (150 mL/Hr) IV Continuous <Continuous>  pantoprazole  Injectable 40 milliGRAM(s) IV Push daily    MEDICATIONS  (PRN):  dextrose 40% Gel 15 Gram(s) Oral once PRN Blood Glucose LESS THAN 70 milliGRAM(s)/deciLiter  glucagon  Injectable 1 milliGRAM(s) IntraMuscular once PRN Glucose <70 milliGRAM(s)/deciLiter  ondansetron Injectable 4 milliGRAM(s) IV Push every 4 hours PRN Nausea and/or Vomiting    EKG Reviewed    35 y/o F with DM1 admitted with DKA.     1. DKA- gap closed improvement of acidosis and patient was able to tolerate clear liquids although is still having nausea. Presumably triggered by UTI. Continue lantus, monitor SSI requirements. Diabetic education, will request endcrinology consult in AM   2. UTI- complicated cystitis in patient with DM1 and DKA  3. Hypotension- 2/2 hypovolemia, osmotic diuresis and GI losses   4. Dysphagia- esophageal with sensation of solids getting stuck in throat after vomiting, likely esophagitis from vomiting, ppi trial, CLD to advance as tolerated, consider gi consult if persistent or worsening.   5. Acidosis- 2/2 DKA     Plan of care discussed with patient ;  all questions and concerns were addressed.

## 2019-04-28 NOTE — CONSULT NOTE ADULT - ATTENDING COMMENTS
35 yo female from home lives with 2 children. PMH Type 1 DM (for the past 14 years), asthma, HLD, IBS, gastritis, depression, s/p , tubal ligation, cholecystectomy, acne (on abx) and bilateral cataracts. Pt not able to  speak  and actively vomiting . patient says that she was having diarrhea and poor po  intake for last  few days and but says she was complaint with insulin  . patient denies any  shortness of breath  but c/o  diffuse abdominal  pain and chest  pain from vomiting and denies any  other acute complaints

## 2019-04-28 NOTE — ED ADULT NURSE NOTE - OBJECTIVE STATEMENT
Pt came in for vomiting green emesis, diarrhea, back pain and abdominal pain since yesterday. Pt is diabetic with .

## 2019-04-28 NOTE — H&P ADULT - PROBLEM SELECTOR PLAN 6
Improve VTE score: 1 (No need of chem ppx)  [ ] Previous VTE                                               3  [ ] Thrombophilia                                             2  [ ] Lower limb paralysis                                   2    [ ] Current Cancer                                           2   [x] Immobilization > 24 hrs                              1  [ ] ICU/CCU stay > 24 hours                            1  [ ] Age > 60                                                       1

## 2019-04-29 DIAGNOSIS — J45.909 UNSPECIFIED ASTHMA, UNCOMPLICATED: ICD-10-CM

## 2019-04-29 DIAGNOSIS — N39.0 URINARY TRACT INFECTION, SITE NOT SPECIFIED: ICD-10-CM

## 2019-04-29 DIAGNOSIS — K21.9 GASTRO-ESOPHAGEAL REFLUX DISEASE WITHOUT ESOPHAGITIS: ICD-10-CM

## 2019-04-29 DIAGNOSIS — I95.9 HYPOTENSION, UNSPECIFIED: ICD-10-CM

## 2019-04-29 DIAGNOSIS — E13.10 OTHER SPECIFIED DIABETES MELLITUS WITH KETOACIDOSIS WITHOUT COMA: ICD-10-CM

## 2019-04-29 DIAGNOSIS — R71.0 PRECIPITOUS DROP IN HEMATOCRIT: ICD-10-CM

## 2019-04-29 LAB
ALBUMIN SERPL ELPH-MCNC: 2.7 G/DL — LOW (ref 3.5–5)
ALP SERPL-CCNC: 72 U/L — SIGNIFICANT CHANGE UP (ref 40–120)
ALT FLD-CCNC: 18 U/L DA — SIGNIFICANT CHANGE UP (ref 10–60)
ANION GAP SERPL CALC-SCNC: 8 MMOL/L — SIGNIFICANT CHANGE UP (ref 5–17)
ANION GAP SERPL CALC-SCNC: 9 MMOL/L — SIGNIFICANT CHANGE UP (ref 5–17)
AST SERPL-CCNC: 14 U/L — SIGNIFICANT CHANGE UP (ref 10–40)
BASOPHILS # BLD AUTO: 0.03 K/UL — SIGNIFICANT CHANGE UP (ref 0–0.2)
BASOPHILS NFR BLD AUTO: 0.3 % — SIGNIFICANT CHANGE UP (ref 0–2)
BILIRUB SERPL-MCNC: 0.9 MG/DL — SIGNIFICANT CHANGE UP (ref 0.2–1.2)
BUN SERPL-MCNC: 11 MG/DL — SIGNIFICANT CHANGE UP (ref 7–18)
BUN SERPL-MCNC: 12 MG/DL — SIGNIFICANT CHANGE UP (ref 7–18)
CALCIUM SERPL-MCNC: 7.5 MG/DL — LOW (ref 8.4–10.5)
CALCIUM SERPL-MCNC: 7.8 MG/DL — LOW (ref 8.4–10.5)
CHLORIDE SERPL-SCNC: 106 MMOL/L — SIGNIFICANT CHANGE UP (ref 96–108)
CHLORIDE SERPL-SCNC: 107 MMOL/L — SIGNIFICANT CHANGE UP (ref 96–108)
CHOLEST SERPL-MCNC: 93 MG/DL — SIGNIFICANT CHANGE UP (ref 10–199)
CK MB BLD-MCNC: 1.2 % — SIGNIFICANT CHANGE UP (ref 0–3.5)
CK MB CFR SERPL CALC: 1.2 NG/ML — SIGNIFICANT CHANGE UP (ref 0–3.6)
CK SERPL-CCNC: 99 U/L — SIGNIFICANT CHANGE UP (ref 21–215)
CO2 SERPL-SCNC: 20 MMOL/L — LOW (ref 22–31)
CO2 SERPL-SCNC: 24 MMOL/L — SIGNIFICANT CHANGE UP (ref 22–31)
CREAT SERPL-MCNC: 0.68 MG/DL — SIGNIFICANT CHANGE UP (ref 0.5–1.3)
CREAT SERPL-MCNC: 0.69 MG/DL — SIGNIFICANT CHANGE UP (ref 0.5–1.3)
CULTURE RESULTS: SIGNIFICANT CHANGE UP
EOSINOPHIL # BLD AUTO: 0.02 K/UL — SIGNIFICANT CHANGE UP (ref 0–0.5)
EOSINOPHIL NFR BLD AUTO: 0.2 % — SIGNIFICANT CHANGE UP (ref 0–6)
FLU A RESULT: SIGNIFICANT CHANGE UP
FLU A RESULT: SIGNIFICANT CHANGE UP
FLUAV AG NPH QL: SIGNIFICANT CHANGE UP
FLUBV AG NPH QL: SIGNIFICANT CHANGE UP
FOLATE SERPL-MCNC: 8.2 NG/ML — SIGNIFICANT CHANGE UP
GLUCOSE BLDC GLUCOMTR-MCNC: 260 MG/DL — HIGH (ref 70–99)
GLUCOSE BLDC GLUCOMTR-MCNC: 270 MG/DL — HIGH (ref 70–99)
GLUCOSE BLDC GLUCOMTR-MCNC: 355 MG/DL — HIGH (ref 70–99)
GLUCOSE SERPL-MCNC: 278 MG/DL — HIGH (ref 70–99)
GLUCOSE SERPL-MCNC: 315 MG/DL — HIGH (ref 70–99)
HBA1C BLD-MCNC: 9.4 % — HIGH (ref 4–5.6)
HCT VFR BLD CALC: 29.4 % — LOW (ref 34.5–45)
HDLC SERPL-MCNC: 41 MG/DL — LOW
HGB BLD-MCNC: 9.8 G/DL — LOW (ref 11.5–15.5)
IMM GRANULOCYTES NFR BLD AUTO: 0.5 % — SIGNIFICANT CHANGE UP (ref 0–1.5)
LIPID PNL WITH DIRECT LDL SERPL: 26 MG/DL — SIGNIFICANT CHANGE UP
LYMPHOCYTES # BLD AUTO: 29.3 % — SIGNIFICANT CHANGE UP (ref 13–44)
LYMPHOCYTES # BLD AUTO: 3.05 K/UL — SIGNIFICANT CHANGE UP (ref 1–3.3)
MAGNESIUM SERPL-MCNC: 1.6 MG/DL — SIGNIFICANT CHANGE UP (ref 1.6–2.6)
MCHC RBC-ENTMCNC: 30 PG — SIGNIFICANT CHANGE UP (ref 27–34)
MCHC RBC-ENTMCNC: 33.3 GM/DL — SIGNIFICANT CHANGE UP (ref 32–36)
MCV RBC AUTO: 89.9 FL — SIGNIFICANT CHANGE UP (ref 80–100)
MONOCYTES # BLD AUTO: 0.78 K/UL — SIGNIFICANT CHANGE UP (ref 0–0.9)
MONOCYTES NFR BLD AUTO: 7.5 % — SIGNIFICANT CHANGE UP (ref 2–14)
NEUTROPHILS # BLD AUTO: 6.48 K/UL — SIGNIFICANT CHANGE UP (ref 1.8–7.4)
NEUTROPHILS NFR BLD AUTO: 62.2 % — SIGNIFICANT CHANGE UP (ref 43–77)
NRBC # BLD: 0 /100 WBCS — SIGNIFICANT CHANGE UP (ref 0–0)
PCP SPEC-MCNC: SIGNIFICANT CHANGE UP
PHOSPHATE SERPL-MCNC: 1.7 MG/DL — LOW (ref 2.5–4.5)
PLATELET # BLD AUTO: 235 K/UL — SIGNIFICANT CHANGE UP (ref 150–400)
POTASSIUM SERPL-MCNC: 4.1 MMOL/L — SIGNIFICANT CHANGE UP (ref 3.5–5.3)
POTASSIUM SERPL-MCNC: 4.8 MMOL/L — SIGNIFICANT CHANGE UP (ref 3.5–5.3)
POTASSIUM SERPL-SCNC: 4.1 MMOL/L — SIGNIFICANT CHANGE UP (ref 3.5–5.3)
POTASSIUM SERPL-SCNC: 4.8 MMOL/L — SIGNIFICANT CHANGE UP (ref 3.5–5.3)
PROT SERPL-MCNC: 5.5 G/DL — LOW (ref 6–8.3)
RBC # BLD: 3.27 M/UL — LOW (ref 3.8–5.2)
RBC # FLD: 13.2 % — SIGNIFICANT CHANGE UP (ref 10.3–14.5)
RSV RESULT: SIGNIFICANT CHANGE UP
RSV RNA RESP QL NAA+PROBE: SIGNIFICANT CHANGE UP
SODIUM SERPL-SCNC: 136 MMOL/L — SIGNIFICANT CHANGE UP (ref 135–145)
SODIUM SERPL-SCNC: 138 MMOL/L — SIGNIFICANT CHANGE UP (ref 135–145)
SPECIMEN SOURCE: SIGNIFICANT CHANGE UP
T4 AB SER-ACNC: 4.1 UG/DL — LOW (ref 4.6–12)
TOTAL CHOLESTEROL/HDL RATIO MEASUREMENT: 2.3 RATIO — LOW (ref 3.3–7.1)
TRIGL SERPL-MCNC: 131 MG/DL — SIGNIFICANT CHANGE UP (ref 10–149)
TROPONIN I SERPL-MCNC: <0.015 NG/ML — SIGNIFICANT CHANGE UP (ref 0–0.04)
TSH SERPL-MCNC: 1.65 UU/ML — SIGNIFICANT CHANGE UP (ref 0.34–4.82)
VIT B12 SERPL-MCNC: 360 PG/ML — SIGNIFICANT CHANGE UP (ref 232–1245)
WBC # BLD: 10.41 K/UL — SIGNIFICANT CHANGE UP (ref 3.8–10.5)
WBC # FLD AUTO: 10.41 K/UL — SIGNIFICANT CHANGE UP (ref 3.8–10.5)

## 2019-04-29 PROCEDURE — 99233 SBSQ HOSP IP/OBS HIGH 50: CPT | Mod: GC

## 2019-04-29 RX ORDER — POTASSIUM PHOSPHATE, MONOBASIC POTASSIUM PHOSPHATE, DIBASIC 236; 224 MG/ML; MG/ML
15 INJECTION, SOLUTION INTRAVENOUS ONCE
Qty: 0 | Refills: 0 | Status: COMPLETED | OUTPATIENT
Start: 2019-04-29 | End: 2019-04-29

## 2019-04-29 RX ORDER — CEFTRIAXONE 500 MG/1
1 INJECTION, POWDER, FOR SOLUTION INTRAMUSCULAR; INTRAVENOUS EVERY 24 HOURS
Qty: 0 | Refills: 0 | Status: DISCONTINUED | OUTPATIENT
Start: 2019-04-29 | End: 2019-04-30

## 2019-04-29 RX ORDER — INSULIN GLARGINE 100 [IU]/ML
20 INJECTION, SOLUTION SUBCUTANEOUS AT BEDTIME
Qty: 0 | Refills: 0 | Status: DISCONTINUED | OUTPATIENT
Start: 2019-04-29 | End: 2019-04-30

## 2019-04-29 RX ORDER — INFLUENZA VIRUS VACCINE 15; 15; 15; 15 UG/.5ML; UG/.5ML; UG/.5ML; UG/.5ML
0.5 SUSPENSION INTRAMUSCULAR ONCE
Qty: 0 | Refills: 0 | Status: DISCONTINUED | OUTPATIENT
Start: 2019-04-29 | End: 2019-04-30

## 2019-04-29 RX ORDER — SIMETHICONE 80 MG/1
80 TABLET, CHEWABLE ORAL THREE TIMES A DAY
Qty: 0 | Refills: 0 | Status: DISCONTINUED | OUTPATIENT
Start: 2019-04-29 | End: 2019-04-30

## 2019-04-29 RX ORDER — INSULIN GLARGINE 100 [IU]/ML
20 INJECTION, SOLUTION SUBCUTANEOUS AT BEDTIME
Qty: 0 | Refills: 0 | Status: DISCONTINUED | OUTPATIENT
Start: 2019-04-29 | End: 2019-04-29

## 2019-04-29 RX ORDER — INSULIN GLARGINE 100 [IU]/ML
40 INJECTION, SOLUTION SUBCUTANEOUS AT BEDTIME
Qty: 0 | Refills: 0 | Status: DISCONTINUED | OUTPATIENT
Start: 2019-04-29 | End: 2019-04-29

## 2019-04-29 RX ORDER — INSULIN LISPRO 100/ML
6 VIAL (ML) SUBCUTANEOUS
Qty: 0 | Refills: 0 | Status: DISCONTINUED | OUTPATIENT
Start: 2019-04-29 | End: 2019-04-30

## 2019-04-29 RX ORDER — SODIUM CHLORIDE 9 MG/ML
1000 INJECTION INTRAMUSCULAR; INTRAVENOUS; SUBCUTANEOUS ONCE
Qty: 0 | Refills: 0 | Status: COMPLETED | OUTPATIENT
Start: 2019-04-29 | End: 2019-04-29

## 2019-04-29 RX ORDER — BENZOCAINE AND MENTHOL 5; 1 G/100ML; G/100ML
1 LIQUID ORAL
Qty: 0 | Refills: 0 | Status: DISCONTINUED | OUTPATIENT
Start: 2019-04-29 | End: 2019-04-30

## 2019-04-29 RX ORDER — LIDOCAINE 4 G/100G
5 CREAM TOPICAL ONCE
Qty: 0 | Refills: 0 | Status: COMPLETED | OUTPATIENT
Start: 2019-04-29 | End: 2019-04-29

## 2019-04-29 RX ADMIN — LIDOCAINE 5 MILLILITER(S): 4 CREAM TOPICAL at 14:59

## 2019-04-29 RX ADMIN — SODIUM CHLORIDE 2000 MILLILITER(S): 9 INJECTION INTRAMUSCULAR; INTRAVENOUS; SUBCUTANEOUS at 00:59

## 2019-04-29 RX ADMIN — SIMETHICONE 80 MILLIGRAM(S): 80 TABLET, CHEWABLE ORAL at 17:59

## 2019-04-29 RX ADMIN — INSULIN GLARGINE 20 UNIT(S): 100 INJECTION, SOLUTION SUBCUTANEOUS at 21:53

## 2019-04-29 RX ADMIN — Medication 6 UNIT(S): at 12:10

## 2019-04-29 RX ADMIN — PANTOPRAZOLE SODIUM 40 MILLIGRAM(S): 20 TABLET, DELAYED RELEASE ORAL at 12:09

## 2019-04-29 RX ADMIN — Medication 1: at 21:53

## 2019-04-29 RX ADMIN — CEFTRIAXONE 100 GRAM(S): 500 INJECTION, POWDER, FOR SOLUTION INTRAMUSCULAR; INTRAVENOUS at 17:58

## 2019-04-29 RX ADMIN — INSULIN GLARGINE 20 UNIT(S): 100 INJECTION, SOLUTION SUBCUTANEOUS at 01:55

## 2019-04-29 RX ADMIN — POTASSIUM PHOSPHATE, MONOBASIC POTASSIUM PHOSPHATE, DIBASIC 62.5 MILLIMOLE(S): 236; 224 INJECTION, SOLUTION INTRAVENOUS at 14:01

## 2019-04-29 RX ADMIN — Medication 2: at 12:10

## 2019-04-29 RX ADMIN — Medication 3: at 08:12

## 2019-04-29 NOTE — CONSULT NOTE ADULT - ASSESSMENT
34 Female, from shelter having PMH of Type 1 DM (for the past 15 years), Asthma, HLD, IBS, Gastritis, Depression, s/p , tubal ligation, cholecystectomy, acne and bilateral cataracts came to hospital for diffuse abdominal pain and diarrhea for past one day. Found to be in DKA.  Pt admits to compliance with insulin however eats predominantly carbs. No hypoglycemia on basal bolus tx.

## 2019-04-29 NOTE — CONSULT NOTE ADULT - SUBJECTIVE AND OBJECTIVE BOX
Patient is a 34y old  Female who presents with a chief complaint of Vomiting and abdominal pain (2019 23:33)      HPI:  34 Female, from shelter having PMH of Type 1 DM (for the past 15 years), Asthma, HLD, IBS, Gastritis, Depression, s/p , tubal ligation, cholecystectomy, acne and bilateral cataracts came to hospital for diffuse abdominal pain and diarrhea for past one day.  Symptoms were associated with NBNB vomiting and unable to eat anything. Pt states she was last admitted in ICU four months ago for similar reason and chest pain. She seems complaint with her insulin regimen (40 Lantus and 15 pre-meals) but has not been able to eat properly because she lives in shelter. Last saw her PMD few days ago and her HbA1c was 10. One of her kid is pre-diabetic. No chest pain, shortness of breath, problems with urine or bowel. Pt admits to compliance with insulin however eats predominantly carbs. No hypoglycemia on basal bolus tx.    SH: , lives in shelter, Has two kids. Smokes weed and drinks alcohol socially.  FH: Diabetes in mother.     ED Course: Tachycardic and hypotensive to 95/58. Labs showed Anion gap metabolic acidosis, small acetone. UA was infected and CT abd showed cystitis and hepatic steatosis. Pt was started on Insulin drip and 3 lit bolus was given. Anion gap was closed and ICU downgraded the patient. EKG showed sinus tachycardia. (2019 23:33)      PAST MEDICAL & SURGICAL HISTORY:  Depression, unspecified depression type  Asthma, unspecified asthma severity, unspecified whether complicated, unspecified whether persistent  HSV-2 infection  HSV-1 infection  GERD (gastroesophageal reflux disease)  Neuropathy  Insulin dependent diabetes mellitus  Diabetes mellitus type 1  S/P endoscopy  H/O colonoscopy  S/P cataract surgery  H/O tubal ligation  History of   S/P cholecystectomy  Insulin pump in place         MEDICATIONS  (STANDING):  ALBUTerol/ipratropium for Nebulization 3 milliLiter(s) Nebulizer every 6 hours  cefTRIAXone   IVPB 1 Gram(s) IV Intermittent every 24 hours  dextrose 50% Injectable 12.5 Gram(s) IV Push once  dextrose 50% Injectable 25 Gram(s) IV Push once  dextrose 50% Injectable 25 Gram(s) IV Push once  influenza   Vaccine 0.5 milliLiter(s) IntraMuscular once  insulin glargine Injectable (LANTUS) 20 Unit(s) SubCutaneous at bedtime  insulin lispro (HumaLOG) corrective regimen sliding scale   SubCutaneous Before meals and at bedtime  lactated ringers. 1000 milliLiter(s) (150 mL/Hr) IV Continuous <Continuous>  pantoprazole  Injectable 40 milliGRAM(s) IV Push daily    MEDICATIONS  (PRN):  benzocaine 15 mG/menthol 3.6 mG (Sugar-Free) Lozenge 1 Lozenge Oral every 3 hours PRN Sore Throat  dextrose 40% Gel 15 Gram(s) Oral once PRN Blood Glucose LESS THAN 70 milliGRAM(s)/deciLiter  glucagon  Injectable 1 milliGRAM(s) IntraMuscular once PRN Glucose <70 milliGRAM(s)/deciLiter  ondansetron Injectable 4 milliGRAM(s) IV Push every 4 hours PRN Nausea and/or Vomiting      FAMILY HISTORY:      SOCIAL HISTORY:      REVIEW OF SYSTEMS:  CONSTITUTIONAL: No fever, weight loss, or fatigue  EYES: No eye pain, visual disturbances, or discharge  ENT:  No difficulty hearing, tinnitus, vertigo; No sinus or throat pain  NECK: No pain or stiffness  RESPIRATORY: No cough, wheezing, chills or hemoptysis; No Shortness of Breath  CARDIOVASCULAR: No chest pain, palpitations, passing out, dizziness, or leg swelling  GASTROINTESTINAL: No abdominal or epigastric pain. No nausea, vomiting, or hematemesis; No diarrhea or constipation. No melena or hematochezia.  GENITOURINARY: No dysuria, frequency, hematuria, or incontinence  NEUROLOGICAL: No headaches, memory loss, loss of strength, numbness, or tremors  SKIN: No itching, burning, rashes, or lesions   LYMPH Nodes: No enlarged glands  ENDOCRINE: No heat or cold intolerance; No hair loss  MUSCULOSKELETAL: No joint pain or swelling; No muscle, back, or extremity pain  PSYCHIATRIC: No depression, anxiety, mood swings, or difficulty sleeping  HEME/LYMPH: No easy bruising, or bleeding gums  ALLERGY AND IMMUNOLOGIC: No hives or eczema	        Vital Signs Last 24 Hrs  T(C): 37.1 (2019 09:57), Max: 37.6 (2019 21:34)  T(F): 98.8 (2019 09:57), Max: 99.6 (2019 21:34)  HR: 99 (2019 09:57) (99 - 122)  BP: 95/63 (2019 09:57) (95/58 - 130/80)  BP(mean): --  RR: 16 (2019 09:57) (16 - 18)  SpO2: 98% (2019 09:57) (98% - 100%)      Constitutional:      HEENT: nad    Neck:  No JVD, bruits or thyromegaly    Respiratory:  Clear without rales or rhonchi    Cardiovascular:  RR without murmur, rub or gallop.    Gastrointestinal: Soft without hepatosplenomegaly.    Extremities: without cyanosis, clubbing or edema.    Neurological:  Oriented   x  3    . No gross sensory or motor defects.        LABS:                        9.8    10.41 )-----------( 235      ( 2019 08:25 )             29.4         138  |  106  |  11  ----------------------------<  278<H>  4.1   |  24  |  0.68    Ca    7.5<L>      2019 08:25  Phos  1.7       Mg     1.6         TPro  5.5<L>  /  Alb  2.7<L>  /  TBili  0.9  /  DBili  x   /  AST  14  /  ALT  18  /  AlkPhos  72      CARDIAC MARKERS ( 2019 01:42 )  <0.015 ng/mL / x     / 99 U/L / x     / 1.2 ng/mL        Urinalysis Basic - ( 2019 14:37 )    Color: Yellow / Appearance: Clear / S.020 / pH: x  Gluc: x / Ketone: Large  / Bili: Negative / Urobili: Negative   Blood: x / Protein: 30 mg/dL / Nitrite: Negative   Leuk Esterase: Moderate / RBC: 5-10 /HPF / WBC >50 /HPF   Sq Epi: x / Non Sq Epi: Many /HPF / Bacteria: Many /HPF                CAPILLARY BLOOD GLUCOSE      POCT Blood Glucose.: 260 mg/dL (2019 08:05)  POCT Blood Glucose.: 270 mg/dL (2019 03:38)  POCT Blood Glucose.: 355 mg/dL (2019 01:47)  POCT Blood Glucose.: 148 mg/dL (2019 21:21)  POCT Blood Glucose.: 225 mg/dL (2019 18:55)  POCT Blood Glucose.: 301 mg/dL (2019 17:57)  POCT Blood Glucose.: 377 mg/dL (2019 17:03)  POCT Blood Glucose.: 417 mg/dL (2019 12:46)      RADIOLOGY & ADDITIONAL STUDIES:

## 2019-04-29 NOTE — PROGRESS NOTE ADULT - ATTENDING COMMENTS
Patient was seen and examined by myself. Case was discussed with house staff in details. I have reviewed and agree with the plan as outlined above with edits where appropriate.    Vital Signs Last 24 Hrs  T(C): 36.5 (29 Apr 2019 14:21), Max: 37.6 (28 Apr 2019 21:34)  T(F): 97.7 (29 Apr 2019 14:21), Max: 99.6 (28 Apr 2019 21:34)  HR: 102 (29 Apr 2019 14:21) (99 - 120)  BP: 135/90 (29 Apr 2019 14:21) (95/58 - 135/90)  BP(mean): --  RR: 18 (29 Apr 2019 14:21) (16 - 18)  SpO2: 100% (29 Apr 2019 14:21) (98% - 100%)                          9.8    10.41 )-----------( 235      ( 29 Apr 2019 08:25 )             29.4       04-29    138  |  106  |  11  ----------------------------<  278<H>  4.1   |  24  |  0.68    Ca    7.5<L>      29 Apr 2019 08:25  Phos  1.7     04-29  Mg     1.6     04-29    TPro  5.5<L>  /  Alb  2.7<L>  /  TBili  0.9  /  DBili  x   /  AST  14  /  ALT  18  /  AlkPhos  72  04-29    33 y/o F with   1. DKA  2. Uncontrolled Type 1 DM  3. Electrolyte derangement with hypophosphatemia and hypomagnesemia  4. Anemia   5. Hypoalbuminemia    Plan as outlined above  Continue insulin; dose adjustments to optimize control  Replace electrolytes.  Encourage PO intake.

## 2019-04-29 NOTE — PROGRESS NOTE ADULT - SUBJECTIVE AND OBJECTIVE BOX
DERIC DARIUSZ   [id number]   34y/Female    Patient is a 34y old  Female who presents with a chief complaint of Vomiting and abdominal pain (2019 10:31)                                                                                     INTERVAL HPI/OVERNIGHT EVENTS:      Patient was seen and examined at bedside. feels better, able to tolerate clears, feels like a lump in her throat. denies vomiting, nausea is better            T(C): 37.1 (19 @ 09:57), Max: 37.6 (19 @ 21:34)  HR: 99 (19 @ 09:57) (99 - 122)  BP: 95/63 (19 @ 09:57) (95/58 - 130/80)  RR: 16 (19 @ 09:57) (16 - 18)  SpO2: 98% (19 @ 09:57) (98% - 100%)  Wt(kg): --  I&O's Summary        REVIEW OF SYSTEMS:  No fever,   No cough, SOB  No chest pain, palpitations  No Abd pain, nausea, vomiting, No diarrhea or constipation  Lump in her throat                                                                                                    PHYSICAL EXAM    GENERAL: Well developed, Young female, no acute distress at this time  HEENT:  Normocephalic/Atraumatic, reactive light reflex, moist mucous membranes  NECK: Supple, no JVD  RESP: Symmetric movement of the chest, clear to auscultation bilaterally, no wheeze, no rhonchi, no crackles noted  CVS: Tachycardia, S1 and S2 audible, no murmur, rubs or gallops noted  GI: Normal active bowel sounds present, NTND  EXTREMITIES:  No edema, no clubbing, cyanosis  MSK: 4/5 strength bilateral upper and lower extremities, sensations intact  PSYCH: Normal mood  NEURO: Alert and oriented x 3                                                                                                           LABS:                        9.8    10.41 )-----------( 235      ( 2019 08:25 )             29.4         138  |  106  |  11  ----------------------------<  278<H>  4.1   |  24  |  0.68    Ca    7.5<L>      2019 08:25  Phos  1.7       Mg     1.6         TPro  5.5<L>  /  Alb  2.7<L>  /  TBili  0.9  /  DBili  x   /  AST  14  /  ALT  18  /  AlkPhos  72  04-      Urinalysis Basic - ( 2019 14:37 )    Color: Yellow / Appearance: Clear / S.020 / pH: x  Gluc: x / Ketone: Large  / Bili: Negative / Urobili: Negative   Blood: x / Protein: 30 mg/dL / Nitrite: Negative   Leuk Esterase: Moderate / RBC: 5-10 /HPF / WBC >50 /HPF   Sq Epi: x / Non Sq Epi: Many /HPF / Bacteria: Many /HPF      CAPILLARY BLOOD GLUCOSE      POCT Blood Glucose.: 218 mg/dL (2019 11:34)  POCT Blood Glucose.: 260 mg/dL (2019 08:05)  POCT Blood Glucose.: 270 mg/dL (2019 03:38)  POCT Blood Glucose.: 355 mg/dL (2019 01:47)  POCT Blood Glucose.: 148 mg/dL (2019 21:21)  POCT Blood Glucose.: 225 mg/dL (2019 18:55)  POCT Blood Glucose.: 301 mg/dL (2019 17:57)  POCT Blood Glucose.: 377 mg/dL (2019 17:03)  POCT Blood Glucose.: 417 mg/dL (2019 12:46)          Urinalysis Basic - ( 2019 14:37 )    Color: Yellow / Appearance: Clear / S.020 / pH: x  Gluc: x / Ketone: Large  / Bili: Negative / Urobili: Negative   Blood: x / Protein: 30 mg/dL / Nitrite: Negative   Leuk Esterase: Moderate / RBC: 5-10 /HPF / WBC >50 /HPF   Sq Epi: x / Non Sq Epi: Many /HPF / Bacteria: Many /HPF      CARDIAC MARKERS ( 2019 01:42 )  <0.015 ng/mL / x     / 99 U/L / x     / 1.2 ng/mL        Radiology:      EKG:

## 2019-04-29 NOTE — CONSULT NOTE ADULT - PROBLEM SELECTOR PROBLEM 2
Asthma, unspecified asthma severity, unspecified whether complicated, unspecified whether persistent
UTI (urinary tract infection)

## 2019-04-29 NOTE — CONSULT NOTE ADULT - PROBLEM SELECTOR RECOMMENDATION 9
s/p dka  change lantus to 40 units qhs  add humalog 6 ac tid  advance diet as tolerated  hba1c level  fsg ac and hs  d/w hs
-  h/o  type 1 dm   -patient takes triesba 40  units at  night and humalog 5 units TID   - high  anion gap  metabolic acidosis , delta ratio of 1 and pure anion gap  -18   -s/p  I liter LR bolus in E.D and c/w 2 liter lr bolus and lr @ 150  ml/hr   -s/p  insulin 15 units regular i.v push  stat and patient was supposed to  be placed on insulin drip  in the E.D   -monitor bmp  q4 hours and fingerstick q1 if on insulin drip   -when patient off drip  and tolerating diet will give lantus 40 units at  bedtime

## 2019-04-30 ENCOUNTER — TRANSCRIPTION ENCOUNTER (OUTPATIENT)
Age: 34
End: 2019-04-30

## 2019-04-30 VITALS
RESPIRATION RATE: 18 BRPM | HEART RATE: 95 BPM | SYSTOLIC BLOOD PRESSURE: 118 MMHG | OXYGEN SATURATION: 100 % | TEMPERATURE: 99 F | DIASTOLIC BLOOD PRESSURE: 80 MMHG

## 2019-04-30 LAB
ANION GAP SERPL CALC-SCNC: 5 MMOL/L — SIGNIFICANT CHANGE UP (ref 5–17)
BUN SERPL-MCNC: 6 MG/DL — LOW (ref 7–18)
CALCIUM SERPL-MCNC: 7.6 MG/DL — LOW (ref 8.4–10.5)
CHLORIDE SERPL-SCNC: 107 MMOL/L — SIGNIFICANT CHANGE UP (ref 96–108)
CO2 SERPL-SCNC: 30 MMOL/L — SIGNIFICANT CHANGE UP (ref 22–31)
CREAT SERPL-MCNC: 0.49 MG/DL — LOW (ref 0.5–1.3)
FERRITIN SERPL-MCNC: 27 NG/ML — SIGNIFICANT CHANGE UP (ref 15–150)
GLUCOSE SERPL-MCNC: 71 MG/DL — SIGNIFICANT CHANGE UP (ref 70–99)
HCT VFR BLD CALC: 30.6 % — LOW (ref 34.5–45)
HGB BLD-MCNC: 10.1 G/DL — LOW (ref 11.5–15.5)
IRON SATN MFR SERPL: 15 % — SIGNIFICANT CHANGE UP (ref 15–50)
IRON SATN MFR SERPL: 46 UG/DL — SIGNIFICANT CHANGE UP (ref 40–150)
MAGNESIUM SERPL-MCNC: 1.8 MG/DL — SIGNIFICANT CHANGE UP (ref 1.6–2.6)
MCHC RBC-ENTMCNC: 29 PG — SIGNIFICANT CHANGE UP (ref 27–34)
MCHC RBC-ENTMCNC: 33 GM/DL — SIGNIFICANT CHANGE UP (ref 32–36)
MCV RBC AUTO: 87.9 FL — SIGNIFICANT CHANGE UP (ref 80–100)
NRBC # BLD: 0 /100 WBCS — SIGNIFICANT CHANGE UP (ref 0–0)
PHOSPHATE SERPL-MCNC: 3.1 MG/DL — SIGNIFICANT CHANGE UP (ref 2.5–4.5)
PLATELET # BLD AUTO: 227 K/UL — SIGNIFICANT CHANGE UP (ref 150–400)
POTASSIUM SERPL-MCNC: 3.6 MMOL/L — SIGNIFICANT CHANGE UP (ref 3.5–5.3)
POTASSIUM SERPL-SCNC: 3.6 MMOL/L — SIGNIFICANT CHANGE UP (ref 3.5–5.3)
RBC # BLD: 3.48 M/UL — LOW (ref 3.8–5.2)
RBC # FLD: 12.9 % — SIGNIFICANT CHANGE UP (ref 10.3–14.5)
SODIUM SERPL-SCNC: 142 MMOL/L — SIGNIFICANT CHANGE UP (ref 135–145)
TIBC SERPL-MCNC: 299 UG/DL — SIGNIFICANT CHANGE UP (ref 250–450)
TRANSFERRIN SERPL-MCNC: 238 MG/DL — SIGNIFICANT CHANGE UP (ref 200–360)
UIBC SERPL-MCNC: 253 UG/DL — SIGNIFICANT CHANGE UP (ref 110–370)
WBC # BLD: 5.79 K/UL — SIGNIFICANT CHANGE UP (ref 3.8–10.5)
WBC # FLD AUTO: 5.79 K/UL — SIGNIFICANT CHANGE UP (ref 3.8–10.5)

## 2019-04-30 PROCEDURE — 87631 RESP VIRUS 3-5 TARGETS: CPT

## 2019-04-30 PROCEDURE — 80053 COMPREHEN METABOLIC PANEL: CPT

## 2019-04-30 PROCEDURE — 96375 TX/PRO/DX INJ NEW DRUG ADDON: CPT

## 2019-04-30 PROCEDURE — 82746 ASSAY OF FOLIC ACID SERUM: CPT

## 2019-04-30 PROCEDURE — 82803 BLOOD GASES ANY COMBINATION: CPT

## 2019-04-30 PROCEDURE — 84484 ASSAY OF TROPONIN QUANT: CPT

## 2019-04-30 PROCEDURE — 87086 URINE CULTURE/COLONY COUNT: CPT

## 2019-04-30 PROCEDURE — 93005 ELECTROCARDIOGRAM TRACING: CPT

## 2019-04-30 PROCEDURE — 96374 THER/PROPH/DIAG INJ IV PUSH: CPT | Mod: XU

## 2019-04-30 PROCEDURE — 99291 CRITICAL CARE FIRST HOUR: CPT | Mod: 25

## 2019-04-30 PROCEDURE — 84702 CHORIONIC GONADOTROPIN TEST: CPT

## 2019-04-30 PROCEDURE — 83550 IRON BINDING TEST: CPT

## 2019-04-30 PROCEDURE — 82728 ASSAY OF FERRITIN: CPT

## 2019-04-30 PROCEDURE — 96372 THER/PROPH/DIAG INJ SC/IM: CPT | Mod: XU

## 2019-04-30 PROCEDURE — 80048 BASIC METABOLIC PNL TOTAL CA: CPT

## 2019-04-30 PROCEDURE — 82607 VITAMIN B-12: CPT

## 2019-04-30 PROCEDURE — 81001 URINALYSIS AUTO W/SCOPE: CPT

## 2019-04-30 PROCEDURE — 84436 ASSAY OF TOTAL THYROXINE: CPT

## 2019-04-30 PROCEDURE — 87040 BLOOD CULTURE FOR BACTERIA: CPT

## 2019-04-30 PROCEDURE — 82550 ASSAY OF CK (CPK): CPT

## 2019-04-30 PROCEDURE — 83036 HEMOGLOBIN GLYCOSYLATED A1C: CPT

## 2019-04-30 PROCEDURE — 82009 KETONE BODYS QUAL: CPT

## 2019-04-30 PROCEDURE — 36415 COLL VENOUS BLD VENIPUNCTURE: CPT

## 2019-04-30 PROCEDURE — 83540 ASSAY OF IRON: CPT

## 2019-04-30 PROCEDURE — 84100 ASSAY OF PHOSPHORUS: CPT

## 2019-04-30 PROCEDURE — 82962 GLUCOSE BLOOD TEST: CPT

## 2019-04-30 PROCEDURE — 84466 ASSAY OF TRANSFERRIN: CPT

## 2019-04-30 PROCEDURE — 80061 LIPID PANEL: CPT

## 2019-04-30 PROCEDURE — 71045 X-RAY EXAM CHEST 1 VIEW: CPT

## 2019-04-30 PROCEDURE — 84443 ASSAY THYROID STIM HORMONE: CPT

## 2019-04-30 PROCEDURE — 82553 CREATINE MB FRACTION: CPT

## 2019-04-30 PROCEDURE — 85027 COMPLETE CBC AUTOMATED: CPT

## 2019-04-30 PROCEDURE — 83735 ASSAY OF MAGNESIUM: CPT

## 2019-04-30 PROCEDURE — 80307 DRUG TEST PRSMV CHEM ANLYZR: CPT

## 2019-04-30 PROCEDURE — 74177 CT ABD & PELVIS W/CONTRAST: CPT

## 2019-04-30 RX ORDER — PANTOPRAZOLE SODIUM 20 MG/1
40 TABLET, DELAYED RELEASE ORAL
Qty: 0 | Refills: 0 | Status: DISCONTINUED | OUTPATIENT
Start: 2019-04-30 | End: 2019-04-30

## 2019-04-30 RX ADMIN — Medication 6 UNIT(S): at 11:51

## 2019-04-30 RX ADMIN — Medication 2: at 11:51

## 2019-04-30 NOTE — DIETITIAN INITIAL EVALUATION ADULT. - PERTINENT LABORATORY DATA
04-30 Na142 mmol/L Glu 71 mg/dL K+ 3.6 mmol/L Cr  0.49 mg/dL<L> BUN 6 mg/dL<L> 04-30 Phos 3.1 mg/dL 04-29 Alb 2.7 g/dL<L> 04-29 [id number] 9.4 %<H> 04-29 Chol 93 mg/dL LDL 26 mg/dL HDL 41 mg/dL<L> Trig 131 mg/dL

## 2019-04-30 NOTE — DISCHARGE NOTE NURSING/CASE MANAGEMENT/SOCIAL WORK - [email or web address]_GEN_ALL_CORE
You can access the [facility] Portal, offered by [facility], by registering with the following website: [email or web address]

## 2019-04-30 NOTE — DISCHARGE NOTE PROVIDER - HOSPITAL COURSE
34 Female, from shelter having PMH of Type 1 DM (for the past 15 years), Asthma, HLD, IBS, Gastritis, Depression, s/p , tubal ligation, cholecystectomy, acne and bilateral cataracts came to hospital for diffuse abdominal pain and diarrhea for past one day.  patient was admitted for acute DKA, likely 2/2 to UTI and dehydration, patient claimed complaince with her insulin, She took 40U lantus in AM and Humalog 15 U TID. Her Ag closed and acidosis resolved after aggressive fluid hydration and IV insulin. She was seen by endocrinologist Dr Peralta. A1C was 9.4    She was given rocephin for 3 days for cystitis, however she stayed afebrile and had no leucocytosis. UCx was contaminated        She will be discharged on the same dose 40 lantus and 15 U TID humalog, with close follow up with endocrinologist as an out patient    Patient is stable for discharge back to her shelter. Plan of care was d/w Dr Lo 34 Female, from shelter having PMH of Type 1 DM (for the past 15 years), Asthma, HLD, IBS, Gastritis, Depression, s/p , tubal ligation, cholecystectomy, acne and bilateral cataracts came to hospital for diffuse abdominal pain and diarrhea for past one day.  patient was admitted for acute DKA, likely 2/2 to UTI and dehydration, patient claimed complaince with her insulin, She took 40U lantus in AM and Humalog 15 U TID. Her Ag closed and acidosis resolved after aggressive fluid hydration and IV insulin. She was seen by endocrinologist Dr Peralta. A1C was 9.4    She was given rocephin for 3 days for cystitis, however she stayed afebrile and had no leucocytosis. UCx was contaminated        She will be discharged on the same dose 40 lantus and 15 U TID humalog, with close follow up with endocrinologist as an out patient    Patient is stable for discharge back to her shelter. Plan of care was d/w Dr Lo.    The hospital course of this patient was uncomplicated and the patient was discharged in stable medical condition.

## 2019-04-30 NOTE — PROGRESS NOTE ADULT - PROBLEM SELECTOR PLAN 4
BP is low however patient is asymptomatic from it  could be baseline BP in a young female
BP is better today

## 2019-04-30 NOTE — DIETITIAN INITIAL EVALUATION ADULT. - OTHER INFO
Nutrition consult requested for education & assessment. Patient currently living in shelter. Visited pt. alert, report eating "okay" & eating 2 meals mostly meals served at shelter such as pasta/pizza & eating snacks between meals(mixed nuts)? also has IBS & unable to tolerate  certain foods? stated  Lbs >3months & presently not on diabetic pump takes insulin injections (40 units Lantus & 15 units pre-meals), aware of consistent carbohydrate counting however due present circumstances unable to prepare meals as she should & looking into referral to neighborhood pantry, followed by . Pt. accepted nutrition education & copies on My Plate Planner with handout on type 1 diabetes mellitus nutrition therapy, reviewed & reinforced. In the am pt. diet advance to solids, consumed 40% of breakfast meal & tolerating, provided food preferences, encourage po intake, followed by Endo/team, skin intake, d/w RN.

## 2019-04-30 NOTE — PROGRESS NOTE ADULT - PROBLEM SELECTOR PLAN 3
CT abd showed cystitis  Started Rocephin  F/u Blood Cx and Urine Cx
CT abd showed cystitis  Started Rocephin day 3, UCx: contamination  Will consider discontinuing Abx after today as she does not have fever/leucocytosis

## 2019-04-30 NOTE — DIETITIAN INITIAL EVALUATION ADULT. - PROBLEM SELECTOR PLAN 1
Abdominal pain and Vomiting  Anion gap metabolic acidosis with Glu of 480  Likely due to UTI and dietary non-compliance   S/p Insulin drip and 3 Lit bolus in ED  C/w IV fluids @ 150cc  Ordered Lantus 20 and HSS  Advance diet to [facility]  Consulted Dr Peralta

## 2019-04-30 NOTE — DIETITIAN INITIAL EVALUATION ADULT. - NS AS NUTRI INTERV ED CONTENT
Nutrition relationship to health/disease/Priority modifications/Survival information/Recommended modifications

## 2019-04-30 NOTE — PROGRESS NOTE ADULT - ASSESSMENT
34 Female, from shelter having PMH of Type 1 DM (for the past 15 years), Asthma, HLD, IBS, Gastritis, Depression, s/p , tubal ligation, cholecystectomy, acne and bilateral cataracts came to hospital for diffuse abdominal pain and diarrhea for past one day.   Admitted to medicine for DKA and UTI.
34 Female, from shelter having PMH of Type 1 DM (for the past 15 years), Asthma, HLD, IBS, Gastritis, Depression, s/p , tubal ligation, cholecystectomy, acne and bilateral cataracts came to hospital for diffuse abdominal pain and diarrhea for past one day. Admitted to medicine for DKA and UTI.

## 2019-04-30 NOTE — PROGRESS NOTE ADULT - SUBJECTIVE AND OBJECTIVE BOX
Interval Events:      Allergies    amoxicillin (Rash)    Intolerances      Endocrine/Metabolic Medications:  dextrose 40% Gel 15 Gram(s) Oral once PRN  dextrose 50% Injectable 12.5 Gram(s) IV Push once  dextrose 50% Injectable 25 Gram(s) IV Push once  dextrose 50% Injectable 25 Gram(s) IV Push once  glucagon  Injectable 1 milliGRAM(s) IntraMuscular once PRN  insulin glargine Injectable (LANTUS) 20 Unit(s) SubCutaneous at bedtime  insulin lispro (HumaLOG) corrective regimen sliding scale   SubCutaneous Before meals and at bedtime  insulin lispro Injectable (HumaLOG) 6 Unit(s) SubCutaneous three times a day before meals      Vital Signs Last 24 Hrs  T(C): 36.5 (30 Apr 2019 04:47), Max: 37.6 (29 Apr 2019 20:44)  T(F): 97.7 (30 Apr 2019 04:47), Max: 99.6 (29 Apr 2019 20:44)  HR: 86 (30 Apr 2019 04:47) (86 - 102)  BP: 118/79 (30 Apr 2019 04:47) (116/75 - 135/90)  BP(mean): --  RR: 16 (30 Apr 2019 04:47) (16 - 18)  SpO2: 99% (30 Apr 2019 04:47) (99% - 100%)      PHYSICAL EXAM  All physical exam findings normal, except those marked:  General:	Alert, active, cooperative, NAD, well hydrated  .		[] Abnormal:  Neck		Normal: supple, no cervical adenopathy, no palpable thyroid  .		[] Abnormal:  Cardiovascular	Normal: regular rate, normal S1, S2, no murmurs  .		[] Abnormal:  Respiratory	Normal: no chest wall deformity, normal respiratory pattern, CTA B/L  .		[] Abnormal:  Abdominal	Normal: soft, ND, NT, bowel sounds present, no masses, no organomegaly  .		[] Abnormal:  		Normal normal genitalia, testes descended, circumcised/uncircumcised  .		Yady stage:			Breast yady:  .		Menstrual history:  .		[] Abnormal:  Extremities	Normal: FROM x4  .		[] Abnormal:  Skin		Normal: intact and not indurated, no rash, no acanthosis nigricans  .		[] Abnormal:  Neurologic	Normal: grossly intact  .		[] Abnormal:    LABS                        10.1   5.79  )-----------( 227      ( 30 Apr 2019 06:29 )             30.6                               142    |  107    |  6                   Calcium: 7.6   / iCa: x      (04-30 @ 06:29)    ----------------------------<  71        Magnesium: 1.8                              3.6     |  30     |  0.49             Phosphorous: 3.1        CAPILLARY BLOOD GLUCOSE      POCT Blood Glucose.: 81 mg/dL (30 Apr 2019 07:54)  POCT Blood Glucose.: 170 mg/dL (29 Apr 2019 20:56)  POCT Blood Glucose.: 105 mg/dL (29 Apr 2019 16:35)  POCT Blood Glucose.: 218 mg/dL (29 Apr 2019 11:34)        Assesment/plan Interval Events:  pt in nad    Allergies    amoxicillin (Rash)    Intolerances      Endocrine/Metabolic Medications:  dextrose 40% Gel 15 Gram(s) Oral once PRN  dextrose 50% Injectable 12.5 Gram(s) IV Push once  dextrose 50% Injectable 25 Gram(s) IV Push once  dextrose 50% Injectable 25 Gram(s) IV Push once  glucagon  Injectable 1 milliGRAM(s) IntraMuscular once PRN  insulin glargine Injectable (LANTUS) 20 Unit(s) SubCutaneous at bedtime  insulin lispro (HumaLOG) corrective regimen sliding scale   SubCutaneous Before meals and at bedtime  insulin lispro Injectable (HumaLOG) 6 Unit(s) SubCutaneous three times a day before meals      Vital Signs Last 24 Hrs  T(C): 36.5 (30 Apr 2019 04:47), Max: 37.6 (29 Apr 2019 20:44)  T(F): 97.7 (30 Apr 2019 04:47), Max: 99.6 (29 Apr 2019 20:44)  HR: 86 (30 Apr 2019 04:47) (86 - 102)  BP: 118/79 (30 Apr 2019 04:47) (116/75 - 135/90)  BP(mean): --  RR: 16 (30 Apr 2019 04:47) (16 - 18)  SpO2: 99% (30 Apr 2019 04:47) (99% - 100%)      PHYSICAL EXAM  All physical exam findings normal, except those marked:  General:	Alert, active, cooperative, NAD, well hydrated  .		[] Abnormal:  Neck		Normal: supple, no cervical adenopathy, no palpable thyroid  .		[] Abnormal:  Cardiovascular	Normal: regular rate, normal S1, S2, no murmurs  .		[] Abnormal:  Respiratory	Normal: no chest wall deformity, normal respiratory pattern, CTA B/L  .		[] Abnormal:  Abdominal	Normal: soft, ND, NT, bowel sounds present, no masses, no organomegaly  .		[] Abnormal:  		Normal normal genitalia, testes descended, circumcised/uncircumcised  .		Yady stage:			Breast yady:  .		Menstrual history:  .		[] Abnormal:  Extremities	Normal: FROM x4  .		[] Abnormal:  Skin		Normal: intact and not indurated, no rash, no acanthosis nigricans  .		[] Abnormal:  Neurologic	Normal: grossly intact  .		[] Abnormal:    LABS                        10.1   5.79  )-----------( 227      ( 30 Apr 2019 06:29 )             30.6                               142    |  107    |  6                   Calcium: 7.6   / iCa: x      (04-30 @ 06:29)    ----------------------------<  71        Magnesium: 1.8                              3.6     |  30     |  0.49             Phosphorous: 3.1        CAPILLARY BLOOD GLUCOSE      POCT Blood Glucose.: 81 mg/dL (30 Apr 2019 07:54)  POCT Blood Glucose.: 170 mg/dL (29 Apr 2019 20:56)  POCT Blood Glucose.: 105 mg/dL (29 Apr 2019 16:35)  POCT Blood Glucose.: 218 mg/dL (29 Apr 2019 11:34)        Assesment/plan  Dm- better controlled  cont current insulin  increase insulin as diet advances  fsg ac and hs  d/w hs

## 2019-04-30 NOTE — PROGRESS NOTE ADULT - PROBLEM SELECTOR PLAN 1
DKA in the setting of non compliance to diet and dehydration  home dose is 40U Lantus and Humalog 15U TID  however she was given 20 U Lantus yesterday night and AM BS was In 80's. She also has a very little requirement for pre-meal humalog  A1C: 9.4  Endo f/up. DC IVF
DKA in the setting of non compliance to diet and dehydration  She says she does not drink enough water and gets only carb rich foods at her shelter, claims compliance with insulin, 40U Lantus and Humalog 15U TID  Now, s/p insulin drip and 3L Bolus, Anion gap is closed and Bicarb is 24.  Able to tolerate diet, advanced to full liquids  Will add 6U Humalog TID as d/w Dr Peralta and Lantus 40U QHS, starting tonight  A1C results pending  nutritional consult and social work carole (patient lives in a shelter)  will c/w IVF for another 1 day

## 2019-04-30 NOTE — DIETITIAN INITIAL EVALUATION ADULT. - MD RECOMMEND
po supplement/Add 1 can Glucerna Shake 1 can (220 Kcal, Protein 10 grams) daily & MVI/minerals to consistent carbohydrate w/evening snack, as medically feasible

## 2019-04-30 NOTE — DISCHARGE NOTE PROVIDER - NSDCCPCAREPLAN_GEN_ALL_CORE_FT
PRINCIPAL DISCHARGE DIAGNOSIS  Diagnosis: Type 1 diabetes mellitus with ketoacidotic coma  Assessment and Plan of Treatment: you came in with abdominal discomfort, nausea and vomiting and were found to have diabetic keto acidosis. your acidosis got better with IV hydration and insulin. You are feeling much better now and able to tolerate a regular diet. Your HbA1C is 9.4.  You can take 15 U of short acting insulin tonight prior to your dinner. Take 30 U lantus tomorrow morning and watch your fingersticks and may then go up to 40 U of lantus the day after if fingersticks remain elevated. may continue with 15 u humalog thrice daily prior to meals. continue with carbohydrate consistent diet. drink upto 8 glasses of water a day. Please follow up with endocrinologist as an out patient      SECONDARY DISCHARGE DIAGNOSES  Diagnosis: Acute cystitis  Assessment and Plan of Treatment: you came in with cystitis however you have not had fever or elevated WBC count. you fiished 3 days of IV antibiotics, no more antibiotics are required

## 2019-04-30 NOTE — DIETITIAN INITIAL EVALUATION ADULT. - FACTORS AFF FOOD INTAKE
other (specify)/weakness, type 1 diabetes, asthma, depression, GERD, neuropathy/pain/persistent nausea/vomiting/difficulty with food procurement/preparation vomiting/other (specify)/difficulty with food procurement/preparation/persistent nausea/weakness, DKA, type 1 diabetes, asthma, depression, GERD, IBS, neuropathy/pain

## 2019-04-30 NOTE — PROGRESS NOTE ADULT - PROBLEM SELECTOR PLAN 2
?dilutional as patient was dehydrated at admission. Will trend H/H  check anemia profile tomorrow with AM Labs  normocytic normochromic on CBC
Anemia profile WN, baseline Hb should be around 9-10

## 2019-04-30 NOTE — DISCHARGE NOTE PROVIDER - CARE PROVIDER_API CALL
Rabia Peralta)  EndocrinologyMetabDiabetes  [address] [place] [address]  Phone: [phone]  Fax: [phone]  Follow Up Time:

## 2019-04-30 NOTE — PROGRESS NOTE ADULT - SUBJECTIVE AND OBJECTIVE BOX
DERIC SHELTONADOS   [id number]   34y/Female    Patient is a 34y old  Female who presents with a chief complaint of Vomiting and abdominal pain (29 Apr 2019 10:31)                                                                                     INTERVAL HPI/OVERNIGHT EVENTS:      Patient was seen and examined at bedside. feels better, tolerated full liquids, diet advanced to regular, no N/V. Feeling of lump in her throat is better        Vital Signs Last 24 Hrs  T(C): 36.5 (30 Apr 2019 04:47), Max: 37.6 (29 Apr 2019 20:44)  T(F): 97.7 (30 Apr 2019 04:47), Max: 99.6 (29 Apr 2019 20:44)  HR: 86 (30 Apr 2019 04:47) (86 - 102)  BP: 118/79 (30 Apr 2019 04:47) (116/75 - 135/90)  BP(mean): --  RR: 16 (30 Apr 2019 04:47) (16 - 18)  SpO2: 99% (30 Apr 2019 04:47) (99% - 100%)        REVIEW OF SYSTEMS:  No fever,   No cough, SOB  No chest pain, palpitations  No Abd pain, nausea, vomiting, No diarrhea or constipation                                                                                                      PHYSICAL EXAM    GENERAL: Well developed, Young female, NAD  HEENT:  Normocephalic/Atraumatic, reactive light reflex, moist mucous membranes  NECK: Supple, no JVD  RESP: Symmetric movement of the chest, clear to auscultation bilaterally, no wheeze, no rhonchi, no crackles noted  CVS: S1S2+  GI: Normal active bowel sounds present, NTND  EXTREMITIES:  No edema, no clubbing, cyanosis  MSK: 4/5 strength bilateral upper and lower extremities, sensations intact  PSYCH: Normal mood  NEURO: Alert and oriented x 3                                                                                                           LABS:                                   10.1   5.79  )-----------( 227      ( 30 Apr 2019 06:29 )             30.6   04-30    142  |  107  |  6<L>  ----------------------------<  71  3.6   |  30  |  0.49<L>    Ca    7.6<L>      30 Apr 2019 06:29  Phos  3.1     04-30  Mg     1.8     04-30    TPro  5.5<L>  /  Alb  2.7<L>  /  TBili  0.9  /  DBili  x   /  AST  14  /  ALT  18  /  AlkPhos  72  04-29    CAPILLARY BLOOD GLUCOSE      POCT Blood Glucose.: 81 mg/dL (30 Apr 2019 07:54)  POCT Blood Glucose.: 170 mg/dL (29 Apr 2019 20:56)  POCT Blood Glucose.: 105 mg/dL (29 Apr 2019 16:35)  POCT Blood Glucose.: 218 mg/dL (29 Apr 2019 11:34)

## 2019-04-30 NOTE — DIETITIAN INITIAL EVALUATION ADULT. - NUTRITION INTERVENTION

## 2019-12-01 ENCOUNTER — OUTPATIENT (OUTPATIENT)
Dept: OUTPATIENT SERVICES | Facility: HOSPITAL | Age: 34
LOS: 1 days | End: 2019-12-01
Payer: MEDICAID

## 2019-12-01 DIAGNOSIS — Z90.49 ACQUIRED ABSENCE OF OTHER SPECIFIED PARTS OF DIGESTIVE TRACT: Chronic | ICD-10-CM

## 2019-12-01 DIAGNOSIS — Z98.89 OTHER SPECIFIED POSTPROCEDURAL STATES: Chronic | ICD-10-CM

## 2019-12-01 DIAGNOSIS — Z98.49 CATARACT EXTRACTION STATUS, UNSPECIFIED EYE: Chronic | ICD-10-CM

## 2019-12-01 DIAGNOSIS — Z98.51 TUBAL LIGATION STATUS: Chronic | ICD-10-CM

## 2019-12-01 PROCEDURE — G9005: CPT

## 2019-12-29 ENCOUNTER — INPATIENT (INPATIENT)
Facility: HOSPITAL | Age: 34
LOS: 1 days | Discharge: OTHER INSTITUTION | DRG: 391 | End: 2019-12-31
Attending: STUDENT IN AN ORGANIZED HEALTH CARE EDUCATION/TRAINING PROGRAM | Admitting: STUDENT IN AN ORGANIZED HEALTH CARE EDUCATION/TRAINING PROGRAM
Payer: MEDICAID

## 2019-12-29 VITALS
HEIGHT: 57 IN | RESPIRATION RATE: 16 BRPM | TEMPERATURE: 98 F | HEART RATE: 131 BPM | SYSTOLIC BLOOD PRESSURE: 106 MMHG | DIASTOLIC BLOOD PRESSURE: 73 MMHG | WEIGHT: 98.11 LBS | OXYGEN SATURATION: 100 %

## 2019-12-29 DIAGNOSIS — E10.10 TYPE 1 DIABETES MELLITUS WITH KETOACIDOSIS WITHOUT COMA: ICD-10-CM

## 2019-12-29 DIAGNOSIS — Z98.89 OTHER SPECIFIED POSTPROCEDURAL STATES: Chronic | ICD-10-CM

## 2019-12-29 DIAGNOSIS — Z90.49 ACQUIRED ABSENCE OF OTHER SPECIFIED PARTS OF DIGESTIVE TRACT: Chronic | ICD-10-CM

## 2019-12-29 DIAGNOSIS — Z98.49 CATARACT EXTRACTION STATUS, UNSPECIFIED EYE: Chronic | ICD-10-CM

## 2019-12-29 DIAGNOSIS — Z98.51 TUBAL LIGATION STATUS: Chronic | ICD-10-CM

## 2019-12-29 LAB
ALBUMIN SERPL ELPH-MCNC: 4.6 G/DL — SIGNIFICANT CHANGE UP (ref 3.5–5)
ALP SERPL-CCNC: 140 U/L — HIGH (ref 40–120)
ALT FLD-CCNC: 28 U/L DA — SIGNIFICANT CHANGE UP (ref 10–60)
ANION GAP SERPL CALC-SCNC: 10 MMOL/L — SIGNIFICANT CHANGE UP (ref 5–17)
ANION GAP SERPL CALC-SCNC: 12 MMOL/L — SIGNIFICANT CHANGE UP (ref 5–17)
ANION GAP SERPL CALC-SCNC: 26 MMOL/L — HIGH (ref 5–17)
ANION GAP SERPL CALC-SCNC: 7 MMOL/L — SIGNIFICANT CHANGE UP (ref 5–17)
ANION GAP SERPL CALC-SCNC: 8 MMOL/L — SIGNIFICANT CHANGE UP (ref 5–17)
APPEARANCE UR: CLEAR — SIGNIFICANT CHANGE UP
AST SERPL-CCNC: 23 U/L — SIGNIFICANT CHANGE UP (ref 10–40)
B-OH-BUTYR SERPL-SCNC: 9 MMOL/L — HIGH
BASE EXCESS BLDV CALC-SCNC: -21.9 MMOL/L — LOW (ref -2–2)
BASOPHILS # BLD AUTO: 0 K/UL — SIGNIFICANT CHANGE UP (ref 0–0.2)
BASOPHILS # BLD AUTO: 0.02 K/UL — SIGNIFICANT CHANGE UP (ref 0–0.2)
BASOPHILS NFR BLD AUTO: 0 % — SIGNIFICANT CHANGE UP (ref 0–2)
BASOPHILS NFR BLD AUTO: 0.1 % — SIGNIFICANT CHANGE UP (ref 0–2)
BILIRUB SERPL-MCNC: 0.5 MG/DL — SIGNIFICANT CHANGE UP (ref 0.2–1.2)
BILIRUB UR-MCNC: NEGATIVE — SIGNIFICANT CHANGE UP
BLOOD GAS COMMENTS, VENOUS: SIGNIFICANT CHANGE UP
BUN SERPL-MCNC: 12 MG/DL — SIGNIFICANT CHANGE UP (ref 7–18)
BUN SERPL-MCNC: 13 MG/DL — SIGNIFICANT CHANGE UP (ref 7–18)
BUN SERPL-MCNC: 17 MG/DL — SIGNIFICANT CHANGE UP (ref 7–18)
BUN SERPL-MCNC: 5 MG/DL — LOW (ref 7–18)
BUN SERPL-MCNC: 9 MG/DL — SIGNIFICANT CHANGE UP (ref 7–18)
CALCIUM SERPL-MCNC: 7.4 MG/DL — LOW (ref 8.4–10.5)
CALCIUM SERPL-MCNC: 7.8 MG/DL — LOW (ref 8.4–10.5)
CALCIUM SERPL-MCNC: 8.3 MG/DL — LOW (ref 8.4–10.5)
CALCIUM SERPL-MCNC: 8.6 MG/DL — SIGNIFICANT CHANGE UP (ref 8.4–10.5)
CALCIUM SERPL-MCNC: 9.2 MG/DL — SIGNIFICANT CHANGE UP (ref 8.4–10.5)
CHLORIDE SERPL-SCNC: 105 MMOL/L — SIGNIFICANT CHANGE UP (ref 96–108)
CHLORIDE SERPL-SCNC: 115 MMOL/L — HIGH (ref 96–108)
CHLORIDE SERPL-SCNC: 115 MMOL/L — HIGH (ref 96–108)
CHLORIDE SERPL-SCNC: 118 MMOL/L — HIGH (ref 96–108)
CHLORIDE SERPL-SCNC: 118 MMOL/L — HIGH (ref 96–108)
CO2 SERPL-SCNC: 15 MMOL/L — LOW (ref 22–31)
CO2 SERPL-SCNC: 18 MMOL/L — LOW (ref 22–31)
CO2 SERPL-SCNC: 21 MMOL/L — LOW (ref 22–31)
CO2 SERPL-SCNC: 21 MMOL/L — LOW (ref 22–31)
CO2 SERPL-SCNC: 8 MMOL/L — CRITICAL LOW (ref 22–31)
COLOR SPEC: YELLOW — SIGNIFICANT CHANGE UP
CREAT SERPL-MCNC: 0.73 MG/DL — SIGNIFICANT CHANGE UP (ref 0.5–1.3)
CREAT SERPL-MCNC: 0.83 MG/DL — SIGNIFICANT CHANGE UP (ref 0.5–1.3)
CREAT SERPL-MCNC: 0.95 MG/DL — SIGNIFICANT CHANGE UP (ref 0.5–1.3)
CREAT SERPL-MCNC: 1.01 MG/DL — SIGNIFICANT CHANGE UP (ref 0.5–1.3)
CREAT SERPL-MCNC: 1.39 MG/DL — HIGH (ref 0.5–1.3)
DIFF PNL FLD: ABNORMAL
EOSINOPHIL # BLD AUTO: 0 K/UL — SIGNIFICANT CHANGE UP (ref 0–0.5)
EOSINOPHIL # BLD AUTO: 0 K/UL — SIGNIFICANT CHANGE UP (ref 0–0.5)
EOSINOPHIL NFR BLD AUTO: 0 % — SIGNIFICANT CHANGE UP (ref 0–6)
EOSINOPHIL NFR BLD AUTO: 0 % — SIGNIFICANT CHANGE UP (ref 0–6)
FLU A RESULT: SIGNIFICANT CHANGE UP
FLU A RESULT: SIGNIFICANT CHANGE UP
FLUAV AG NPH QL: SIGNIFICANT CHANGE UP
FLUBV AG NPH QL: SIGNIFICANT CHANGE UP
GLUCOSE BLDC GLUCOMTR-MCNC: 130 MG/DL — HIGH (ref 70–99)
GLUCOSE BLDC GLUCOMTR-MCNC: 142 MG/DL — HIGH (ref 70–99)
GLUCOSE BLDC GLUCOMTR-MCNC: 142 MG/DL — HIGH (ref 70–99)
GLUCOSE BLDC GLUCOMTR-MCNC: 160 MG/DL — HIGH (ref 70–99)
GLUCOSE BLDC GLUCOMTR-MCNC: 162 MG/DL — HIGH (ref 70–99)
GLUCOSE BLDC GLUCOMTR-MCNC: 175 MG/DL — HIGH (ref 70–99)
GLUCOSE BLDC GLUCOMTR-MCNC: 180 MG/DL — HIGH (ref 70–99)
GLUCOSE BLDC GLUCOMTR-MCNC: 181 MG/DL — HIGH (ref 70–99)
GLUCOSE BLDC GLUCOMTR-MCNC: 183 MG/DL — HIGH (ref 70–99)
GLUCOSE BLDC GLUCOMTR-MCNC: 192 MG/DL — HIGH (ref 70–99)
GLUCOSE BLDC GLUCOMTR-MCNC: 198 MG/DL — HIGH (ref 70–99)
GLUCOSE BLDC GLUCOMTR-MCNC: 200 MG/DL — HIGH (ref 70–99)
GLUCOSE BLDC GLUCOMTR-MCNC: 204 MG/DL — HIGH (ref 70–99)
GLUCOSE BLDC GLUCOMTR-MCNC: 207 MG/DL — HIGH (ref 70–99)
GLUCOSE BLDC GLUCOMTR-MCNC: 217 MG/DL — HIGH (ref 70–99)
GLUCOSE BLDC GLUCOMTR-MCNC: 222 MG/DL — HIGH (ref 70–99)
GLUCOSE BLDC GLUCOMTR-MCNC: 227 MG/DL — HIGH (ref 70–99)
GLUCOSE BLDC GLUCOMTR-MCNC: 230 MG/DL — HIGH (ref 70–99)
GLUCOSE SERPL-MCNC: 125 MG/DL — HIGH (ref 70–99)
GLUCOSE SERPL-MCNC: 173 MG/DL — HIGH (ref 70–99)
GLUCOSE SERPL-MCNC: 186 MG/DL — HIGH (ref 70–99)
GLUCOSE SERPL-MCNC: 230 MG/DL — HIGH (ref 70–99)
GLUCOSE SERPL-MCNC: 412 MG/DL — HIGH (ref 70–99)
GLUCOSE UR QL: 1000 MG/DL
HBA1C BLD-MCNC: 11.1 % — HIGH (ref 4–5.6)
HCG SERPL-ACNC: <1 MIU/ML — SIGNIFICANT CHANGE UP
HCO3 BLDV-SCNC: 7 MMOL/L — LOW (ref 21–29)
HCT VFR BLD CALC: 35.3 % — SIGNIFICANT CHANGE UP (ref 34.5–45)
HCT VFR BLD CALC: 42.9 % — SIGNIFICANT CHANGE UP (ref 34.5–45)
HGB BLD-MCNC: 11.8 G/DL — SIGNIFICANT CHANGE UP (ref 11.5–15.5)
HGB BLD-MCNC: 14 G/DL — SIGNIFICANT CHANGE UP (ref 11.5–15.5)
HOROWITZ INDEX BLDV+IHG-RTO: 21 — SIGNIFICANT CHANGE UP
IMM GRANULOCYTES NFR BLD AUTO: 0.8 % — SIGNIFICANT CHANGE UP (ref 0–1.5)
KETONES UR-MCNC: ABNORMAL
LEUKOCYTE ESTERASE UR-ACNC: NEGATIVE — SIGNIFICANT CHANGE UP
LIDOCAIN IGE QN: 89 U/L — SIGNIFICANT CHANGE UP (ref 73–393)
LYMPHOCYTES # BLD AUTO: 1.02 K/UL — SIGNIFICANT CHANGE UP (ref 1–3.3)
LYMPHOCYTES # BLD AUTO: 1.46 K/UL — SIGNIFICANT CHANGE UP (ref 1–3.3)
LYMPHOCYTES # BLD AUTO: 10 % — LOW (ref 13–44)
LYMPHOCYTES # BLD AUTO: 5.1 % — LOW (ref 13–44)
MAGNESIUM SERPL-MCNC: 1.7 MG/DL — SIGNIFICANT CHANGE UP (ref 1.6–2.6)
MAGNESIUM SERPL-MCNC: 2.1 MG/DL — SIGNIFICANT CHANGE UP (ref 1.6–2.6)
MCHC RBC-ENTMCNC: 29 PG — SIGNIFICANT CHANGE UP (ref 27–34)
MCHC RBC-ENTMCNC: 29.1 PG — SIGNIFICANT CHANGE UP (ref 27–34)
MCHC RBC-ENTMCNC: 32.6 GM/DL — SIGNIFICANT CHANGE UP (ref 32–36)
MCHC RBC-ENTMCNC: 33.4 GM/DL — SIGNIFICANT CHANGE UP (ref 32–36)
MCV RBC AUTO: 86.7 FL — SIGNIFICANT CHANGE UP (ref 80–100)
MCV RBC AUTO: 89.2 FL — SIGNIFICANT CHANGE UP (ref 80–100)
MONOCYTES # BLD AUTO: 0.58 K/UL — SIGNIFICANT CHANGE UP (ref 0–0.9)
MONOCYTES # BLD AUTO: 1.47 K/UL — HIGH (ref 0–0.9)
MONOCYTES NFR BLD AUTO: 4 % — SIGNIFICANT CHANGE UP (ref 2–14)
MONOCYTES NFR BLD AUTO: 7.3 % — SIGNIFICANT CHANGE UP (ref 2–14)
NEUTROPHILS # BLD AUTO: 12.53 K/UL — HIGH (ref 1.8–7.4)
NEUTROPHILS # BLD AUTO: 17.44 K/UL — HIGH (ref 1.8–7.4)
NEUTROPHILS NFR BLD AUTO: 86 % — HIGH (ref 43–77)
NEUTROPHILS NFR BLD AUTO: 86.7 % — HIGH (ref 43–77)
NITRITE UR-MCNC: NEGATIVE — SIGNIFICANT CHANGE UP
NRBC # BLD: 0 /100 WBCS — SIGNIFICANT CHANGE UP (ref 0–0)
PCO2 BLDV: 21 MMHG — LOW (ref 35–50)
PH BLDV: 7.12 — CRITICAL LOW (ref 7.35–7.45)
PH UR: 5 — SIGNIFICANT CHANGE UP (ref 5–8)
PHOSPHATE SERPL-MCNC: 1 MG/DL — CRITICAL LOW (ref 2.5–4.5)
PHOSPHATE SERPL-MCNC: 1.6 MG/DL — LOW (ref 2.5–4.5)
PHOSPHATE SERPL-MCNC: 1.7 MG/DL — LOW (ref 2.5–4.5)
PLATELET # BLD AUTO: 387 K/UL — SIGNIFICANT CHANGE UP (ref 150–400)
PLATELET # BLD AUTO: 458 K/UL — HIGH (ref 150–400)
PO2 BLDV: 41 MMHG — SIGNIFICANT CHANGE UP (ref 25–45)
POTASSIUM SERPL-MCNC: 3.6 MMOL/L — SIGNIFICANT CHANGE UP (ref 3.5–5.3)
POTASSIUM SERPL-MCNC: 3.9 MMOL/L — SIGNIFICANT CHANGE UP (ref 3.5–5.3)
POTASSIUM SERPL-MCNC: 3.9 MMOL/L — SIGNIFICANT CHANGE UP (ref 3.5–5.3)
POTASSIUM SERPL-MCNC: 4 MMOL/L — SIGNIFICANT CHANGE UP (ref 3.5–5.3)
POTASSIUM SERPL-MCNC: 4 MMOL/L — SIGNIFICANT CHANGE UP (ref 3.5–5.3)
POTASSIUM SERPL-SCNC: 3.6 MMOL/L — SIGNIFICANT CHANGE UP (ref 3.5–5.3)
POTASSIUM SERPL-SCNC: 3.9 MMOL/L — SIGNIFICANT CHANGE UP (ref 3.5–5.3)
POTASSIUM SERPL-SCNC: 3.9 MMOL/L — SIGNIFICANT CHANGE UP (ref 3.5–5.3)
POTASSIUM SERPL-SCNC: 4 MMOL/L — SIGNIFICANT CHANGE UP (ref 3.5–5.3)
POTASSIUM SERPL-SCNC: 4 MMOL/L — SIGNIFICANT CHANGE UP (ref 3.5–5.3)
PROT SERPL-MCNC: 9.5 G/DL — HIGH (ref 6–8.3)
PROT UR-MCNC: 100
RBC # BLD: 4.07 M/UL — SIGNIFICANT CHANGE UP (ref 3.8–5.2)
RBC # BLD: 4.81 M/UL — SIGNIFICANT CHANGE UP (ref 3.8–5.2)
RBC # FLD: 14.3 % — SIGNIFICANT CHANGE UP (ref 10.3–14.5)
RBC # FLD: 14.4 % — SIGNIFICANT CHANGE UP (ref 10.3–14.5)
RSV RESULT: SIGNIFICANT CHANGE UP
RSV RNA RESP QL NAA+PROBE: SIGNIFICANT CHANGE UP
SAO2 % BLDV: 65 % — LOW (ref 67–88)
SODIUM SERPL-SCNC: 139 MMOL/L — SIGNIFICANT CHANGE UP (ref 135–145)
SODIUM SERPL-SCNC: 142 MMOL/L — SIGNIFICANT CHANGE UP (ref 135–145)
SODIUM SERPL-SCNC: 143 MMOL/L — SIGNIFICANT CHANGE UP (ref 135–145)
SODIUM SERPL-SCNC: 146 MMOL/L — HIGH (ref 135–145)
SODIUM SERPL-SCNC: 147 MMOL/L — HIGH (ref 135–145)
SP GR SPEC: 1.02 — SIGNIFICANT CHANGE UP (ref 1.01–1.02)
UROBILINOGEN FLD QL: NEGATIVE — SIGNIFICANT CHANGE UP
WBC # BLD: 14.57 K/UL — HIGH (ref 3.8–10.5)
WBC # BLD: 20.11 K/UL — HIGH (ref 3.8–10.5)
WBC # FLD AUTO: 14.57 K/UL — HIGH (ref 3.8–10.5)
WBC # FLD AUTO: 20.11 K/UL — HIGH (ref 3.8–10.5)

## 2019-12-29 PROCEDURE — 71045 X-RAY EXAM CHEST 1 VIEW: CPT | Mod: 26

## 2019-12-29 PROCEDURE — 99285 EMERGENCY DEPT VISIT HI MDM: CPT

## 2019-12-29 PROCEDURE — 99222 1ST HOSP IP/OBS MODERATE 55: CPT | Mod: GC

## 2019-12-29 RX ORDER — POTASSIUM PHOSPHATE, MONOBASIC POTASSIUM PHOSPHATE, DIBASIC 236; 224 MG/ML; MG/ML
30 INJECTION, SOLUTION INTRAVENOUS ONCE
Refills: 0 | Status: COMPLETED | OUTPATIENT
Start: 2019-12-29 | End: 2019-12-29

## 2019-12-29 RX ORDER — SODIUM CHLORIDE 9 MG/ML
1000 INJECTION, SOLUTION INTRAVENOUS
Refills: 0 | Status: DISCONTINUED | OUTPATIENT
Start: 2019-12-29 | End: 2019-12-29

## 2019-12-29 RX ORDER — PANTOPRAZOLE SODIUM 20 MG/1
40 TABLET, DELAYED RELEASE ORAL
Refills: 0 | Status: DISCONTINUED | OUTPATIENT
Start: 2019-12-29 | End: 2019-12-31

## 2019-12-29 RX ORDER — PANTOPRAZOLE SODIUM 20 MG/1
40 TABLET, DELAYED RELEASE ORAL ONCE
Refills: 0 | Status: COMPLETED | OUTPATIENT
Start: 2019-12-29 | End: 2019-12-29

## 2019-12-29 RX ORDER — ONDANSETRON 8 MG/1
4 TABLET, FILM COATED ORAL EVERY 6 HOURS
Refills: 0 | Status: DISCONTINUED | OUTPATIENT
Start: 2019-12-29 | End: 2019-12-31

## 2019-12-29 RX ORDER — ACETAMINOPHEN 500 MG
650 TABLET ORAL EVERY 6 HOURS
Refills: 0 | Status: DISCONTINUED | OUTPATIENT
Start: 2019-12-29 | End: 2019-12-31

## 2019-12-29 RX ORDER — POTASSIUM PHOSPHATE, MONOBASIC POTASSIUM PHOSPHATE, DIBASIC 236; 224 MG/ML; MG/ML
15 INJECTION, SOLUTION INTRAVENOUS ONCE
Refills: 0 | Status: COMPLETED | OUTPATIENT
Start: 2019-12-29 | End: 2019-12-29

## 2019-12-29 RX ORDER — INSULIN HUMAN 100 [IU]/ML
4.5 INJECTION, SOLUTION SUBCUTANEOUS
Qty: 100 | Refills: 0 | Status: DISCONTINUED | OUTPATIENT
Start: 2019-12-29 | End: 2019-12-30

## 2019-12-29 RX ORDER — ONDANSETRON 8 MG/1
4 TABLET, FILM COATED ORAL ONCE
Refills: 0 | Status: COMPLETED | OUTPATIENT
Start: 2019-12-29 | End: 2019-12-29

## 2019-12-29 RX ORDER — HEPARIN SODIUM 5000 [USP'U]/ML
5000 INJECTION INTRAVENOUS; SUBCUTANEOUS EVERY 8 HOURS
Refills: 0 | Status: DISCONTINUED | OUTPATIENT
Start: 2019-12-29 | End: 2019-12-31

## 2019-12-29 RX ORDER — INSULIN HUMAN 100 [IU]/ML
4.5 INJECTION, SOLUTION SUBCUTANEOUS ONCE
Refills: 0 | Status: COMPLETED | OUTPATIENT
Start: 2019-12-29 | End: 2019-12-29

## 2019-12-29 RX ORDER — SODIUM CHLORIDE 9 MG/ML
2000 INJECTION INTRAMUSCULAR; INTRAVENOUS; SUBCUTANEOUS ONCE
Refills: 0 | Status: COMPLETED | OUTPATIENT
Start: 2019-12-29 | End: 2019-12-29

## 2019-12-29 RX ORDER — INFLUENZA VIRUS VACCINE 15; 15; 15; 15 UG/.5ML; UG/.5ML; UG/.5ML; UG/.5ML
0.5 SUSPENSION INTRAMUSCULAR ONCE
Refills: 0 | Status: COMPLETED | OUTPATIENT
Start: 2019-12-29 | End: 2019-12-29

## 2019-12-29 RX ORDER — CHLORHEXIDINE GLUCONATE 213 G/1000ML
1 SOLUTION TOPICAL
Refills: 0 | Status: DISCONTINUED | OUTPATIENT
Start: 2019-12-29 | End: 2019-12-31

## 2019-12-29 RX ORDER — DEXTROSE MONOHYDRATE, SODIUM CHLORIDE, AND POTASSIUM CHLORIDE 50; .745; 4.5 G/1000ML; G/1000ML; G/1000ML
1000 INJECTION, SOLUTION INTRAVENOUS
Refills: 0 | Status: DISCONTINUED | OUTPATIENT
Start: 2019-12-29 | End: 2019-12-30

## 2019-12-29 RX ADMIN — INSULIN HUMAN 4.5 UNIT(S): 100 INJECTION, SOLUTION SUBCUTANEOUS at 03:48

## 2019-12-29 RX ADMIN — Medication 30 MILLILITER(S): at 06:13

## 2019-12-29 RX ADMIN — DEXTROSE MONOHYDRATE, SODIUM CHLORIDE, AND POTASSIUM CHLORIDE 150 MILLILITER(S): 50; .745; 4.5 INJECTION, SOLUTION INTRAVENOUS at 10:38

## 2019-12-29 RX ADMIN — PANTOPRAZOLE SODIUM 40 MILLIGRAM(S): 20 TABLET, DELAYED RELEASE ORAL at 06:13

## 2019-12-29 RX ADMIN — HEPARIN SODIUM 5000 UNIT(S): 5000 INJECTION INTRAVENOUS; SUBCUTANEOUS at 15:00

## 2019-12-29 RX ADMIN — ONDANSETRON 4 MILLIGRAM(S): 8 TABLET, FILM COATED ORAL at 08:38

## 2019-12-29 RX ADMIN — POTASSIUM PHOSPHATE, MONOBASIC POTASSIUM PHOSPHATE, DIBASIC 83.33 MILLIMOLE(S): 236; 224 INJECTION, SOLUTION INTRAVENOUS at 20:05

## 2019-12-29 RX ADMIN — DEXTROSE MONOHYDRATE, SODIUM CHLORIDE, AND POTASSIUM CHLORIDE 150 MILLILITER(S): 50; .745; 4.5 INJECTION, SOLUTION INTRAVENOUS at 17:22

## 2019-12-29 RX ADMIN — Medication 30 MILLILITER(S): at 15:45

## 2019-12-29 RX ADMIN — SODIUM CHLORIDE 2000 MILLILITER(S): 9 INJECTION INTRAMUSCULAR; INTRAVENOUS; SUBCUTANEOUS at 02:07

## 2019-12-29 RX ADMIN — PANTOPRAZOLE SODIUM 40 MILLIGRAM(S): 20 TABLET, DELAYED RELEASE ORAL at 08:37

## 2019-12-29 RX ADMIN — CHLORHEXIDINE GLUCONATE 1 APPLICATION(S): 213 SOLUTION TOPICAL at 06:14

## 2019-12-29 RX ADMIN — DEXTROSE MONOHYDRATE, SODIUM CHLORIDE, AND POTASSIUM CHLORIDE 150 MILLILITER(S): 50; .745; 4.5 INJECTION, SOLUTION INTRAVENOUS at 23:28

## 2019-12-29 RX ADMIN — SODIUM CHLORIDE 150 MILLILITER(S): 9 INJECTION, SOLUTION INTRAVENOUS at 08:38

## 2019-12-29 RX ADMIN — HEPARIN SODIUM 5000 UNIT(S): 5000 INJECTION INTRAVENOUS; SUBCUTANEOUS at 21:06

## 2019-12-29 RX ADMIN — INSULIN HUMAN 4.5 UNIT(S)/HR: 100 INJECTION, SOLUTION SUBCUTANEOUS at 03:49

## 2019-12-29 RX ADMIN — SODIUM CHLORIDE 150 MILLILITER(S): 9 INJECTION, SOLUTION INTRAVENOUS at 05:10

## 2019-12-29 RX ADMIN — ONDANSETRON 4 MILLIGRAM(S): 8 TABLET, FILM COATED ORAL at 02:07

## 2019-12-29 RX ADMIN — POTASSIUM PHOSPHATE, MONOBASIC POTASSIUM PHOSPHATE, DIBASIC 62.5 MILLIMOLE(S): 236; 224 INJECTION, SOLUTION INTRAVENOUS at 10:20

## 2019-12-29 RX ADMIN — HEPARIN SODIUM 5000 UNIT(S): 5000 INJECTION INTRAVENOUS; SUBCUTANEOUS at 06:13

## 2019-12-29 NOTE — PROGRESS NOTE ADULT - SUBJECTIVE AND OBJECTIVE BOX
INTERVAL HPI/OVERNIGHT EVENTS:   patient seen and examined at bedside.  admitted with AG of 26 with DKA likely secondary to infection.  F/u blood cultures and stool studies as she is having diarrhea.  Will hold off on antibiotics for now.  On insulin drip.  Aggressive IV hydration with LR with serum potassium in fluid.  BMP Q 4 hourly    PRESSORS: [ ] YES [ ] NO  WHICH:    ANTIBIOTICS:                  DATE STARTED:  ANTIBIOTICS:                  DATE STARTED:  ANTIBIOTICS:                  DATE STARTED:    Antimicrobial:    Cardiovascular:    Pulmonary:    Hematalogic:  heparin  Injectable 5000 Unit(s) SubCutaneous every 8 hours    Other:  acetaminophen    Suspension .. 650 milliGRAM(s) Oral every 6 hours PRN  aluminum hydroxide/magnesium hydroxide/simethicone Suspension 30 milliLiter(s) Oral every 6 hours PRN  chlorhexidine 4% Liquid 1 Application(s) Topical <User Schedule>  influenza   Vaccine 0.5 milliLiter(s) IntraMuscular once  insulin regular Infusion 4.5 Unit(s)/Hr IV Continuous <Continuous>  lactated ringers 1000 milliLiter(s) IV Continuous <Continuous>  ondansetron Injectable 4 milliGRAM(s) IV Push every 6 hours PRN  pantoprazole    Tablet 40 milliGRAM(s) Oral before breakfast    acetaminophen    Suspension .. 650 milliGRAM(s) Oral every 6 hours PRN  aluminum hydroxide/magnesium hydroxide/simethicone Suspension 30 milliLiter(s) Oral every 6 hours PRN  chlorhexidine 4% Liquid 1 Application(s) Topical <User Schedule>  heparin  Injectable 5000 Unit(s) SubCutaneous every 8 hours  influenza   Vaccine 0.5 milliLiter(s) IntraMuscular once  insulin regular Infusion 4.5 Unit(s)/Hr IV Continuous <Continuous>  lactated ringers 1000 milliLiter(s) IV Continuous <Continuous>  ondansetron Injectable 4 milliGRAM(s) IV Push every 6 hours PRN  pantoprazole    Tablet 40 milliGRAM(s) Oral before breakfast    Drug Dosing Weight  Height (cm): 142.2 (29 Dec 2019 05:00)  Weight (kg): 43.6 (29 Dec 2019 05:00)  BMI (kg/m2): 21.6 (29 Dec 2019 05:00)  BSA (m2): 1.3 (29 Dec 2019 05:00)    CENTRAL LINE: [ ] YES [ ] NO  LOCATION:         GEORGE: [ ] YES [ ] NO          A-LINE:  [ ] YES [ ] NO  LOCATION:             ICU Vital Signs Last 24 Hrs  T(C): 36.2 (29 Dec 2019 05:00), Max: 36.7 (29 Dec 2019 00:57)  T(F): 97.2 (29 Dec 2019 05:00), Max: 98 (29 Dec 2019 00:57)  HR: 113 (29 Dec 2019 06:00) (113 - 131)  BP: 98/65 (29 Dec 2019 06:00) (98/65 - 107/71)  BP(mean): 72 (29 Dec 2019 06:00) (72 - 80)  ABP: --  ABP(mean): --  RR: 18 (29 Dec 2019 06:00) (15 - 18)  SpO2: 100% (29 Dec 2019 06:00) (100% - 100%)          Physical Exam: · Constitutional	Well-developed, well nourished  	· Eyes	conjuctivae clear  	· ENMT	No oral lesions; no gross abnormalities  	· Neck	No bruits; no thyromegaly or nodules   	· Back	No deformity or limitation of movement   	· Respiratory	Breath Sounds equal & clear to percussion & auscultation, no accessory muscle use  	· Cardiovascular	Regular rate & rhythm, normal S1, S2; no murmurs, gallops or rubs; no S3, S4  	· Gastrointestinal	Soft,  no hepatosplenomegaly, normal bowel sounds  	                              tenderness over epigastric and substernal area   	· Extremities	No cyanosis, clubbing or edema   	· Neurological	Alert & oriented; no sensory, motor or coordination deficits, normal reflexes  · Musculoskeletal	No joint pain, swelling or deformity; no limitation of movement        LABS:  CBC Full  -  ( 29 Dec 2019 02:15 )  WBC Count : 20.11 K/uL  RBC Count : 4.81 M/uL  Hemoglobin : 14.0 g/dL  Hematocrit : 42.9 %  Platelet Count - Automated : 458 K/uL  Mean Cell Volume : 89.2 fl  Mean Cell Hemoglobin : 29.1 pg  Mean Cell Hemoglobin Concentration : 32.6 gm/dL  Auto Neutrophil # : 17.44 K/uL  Auto Lymphocyte # : 1.02 K/uL  Auto Monocyte # : 1.47 K/uL  Auto Eosinophil # : 0.00 K/uL  Auto Basophil # : 0.02 K/uL  Auto Neutrophil % : 86.7 %  Auto Lymphocyte % : 5.1 %  Auto Monocyte % : 7.3 %  Auto Eosinophil % : 0.0 %  Auto Basophil % : 0.1 %    12-29    139  |  105  |  17  ----------------------------<  412<H>  4.0   |  8<LL>  |  1.39<H>    Ca    9.2      29 Dec 2019 02:15    TPro  9.5<H>  /  Alb  4.6  /  TBili  0.5  /  DBili  x   /  AST  23  /  ALT  28  /  AlkPhos  140<H>  12-29                [ ]GOALS OF CARE DISCUSSION WITH PATIENT/FAMILY/PROXY: full code    CRITICAL CARE TIME SPENT: 35 minutes INTERVAL HPI/OVERNIGHT EVENTS:   patient seen and examined at bedside.  admitted with AG of 26 with DKA likely secondary to infection, Anion gap closed in AM labs   F/u blood cultures and stool studies as she is having diarrhea.  Will hold off on antibiotics for now.  On insulin drip untill patient is able to tolerate PO   Aggressive IV hydration with LR with serum potassium in fluid on admission. FS: trended down, IV fluids changed to IV D5+ half NS   Phosphate IV   BMP Q 4 hourly    PRESSORS: [ ] YES [ x ] NO  WHICH:    Antimicrobial:    Cardiovascular:    Pulmonary:    Hematalogic:  heparin  Injectable 5000 Unit(s) SubCutaneous every 8 hours    Other:  acetaminophen    Suspension .. 650 milliGRAM(s) Oral every 6 hours PRN  aluminum hydroxide/magnesium hydroxide/simethicone Suspension 30 milliLiter(s) Oral every 6 hours PRN  chlorhexidine 4% Liquid 1 Application(s) Topical <User Schedule>  influenza   Vaccine 0.5 milliLiter(s) IntraMuscular once  insulin regular Infusion 4.5 Unit(s)/Hr IV Continuous <Continuous>  lactated ringers 1000 milliLiter(s) IV Continuous <Continuous>  ondansetron Injectable 4 milliGRAM(s) IV Push every 6 hours PRN  pantoprazole    Tablet 40 milliGRAM(s) Oral before breakfast    acetaminophen    Suspension .. 650 milliGRAM(s) Oral every 6 hours PRN  aluminum hydroxide/magnesium hydroxide/simethicone Suspension 30 milliLiter(s) Oral every 6 hours PRN  chlorhexidine 4% Liquid 1 Application(s) Topical <User Schedule>  heparin  Injectable 5000 Unit(s) SubCutaneous every 8 hours  influenza   Vaccine 0.5 milliLiter(s) IntraMuscular once  insulin regular Infusion 4.5 Unit(s)/Hr IV Continuous <Continuous>  lactated ringers 1000 milliLiter(s) IV Continuous <Continuous>  ondansetron Injectable 4 milliGRAM(s) IV Push every 6 hours PRN  pantoprazole    Tablet 40 milliGRAM(s) Oral before breakfast    Drug Dosing Weight  Height (cm): 142.2 (29 Dec 2019 05:00)  Weight (kg): 43.6 (29 Dec 2019 05:00)  BMI (kg/m2): 21.6 (29 Dec 2019 05:00)  BSA (m2): 1.3 (29 Dec 2019 05:00)    CENTRAL LINE: [ ] YES [ x] NO  LOCATION:         GEORGE: [ ] YES [x ] NO          A-LINE:  [ ] YES [x ] NO  LOCATION:             ICU Vital Signs Last 24 Hrs  T(C): 36.2 (29 Dec 2019 05:00), Max: 36.7 (29 Dec 2019 00:57)  T(F): 97.2 (29 Dec 2019 05:00), Max: 98 (29 Dec 2019 00:57)  HR: 113 (29 Dec 2019 06:00) (113 - 131)  BP: 98/65 (29 Dec 2019 06:00) (98/65 - 107/71)  BP(mean): 72 (29 Dec 2019 06:00) (72 - 80)  RR: 18 (29 Dec 2019 06:00) (15 - 18)  SpO2: 100% (29 Dec 2019 06:00) (100% - 100%)      Physical Exam: · Constitutional	Well-developed, well nourished  	· Eyes	conjuctivae clear  	· ENMT	No oral lesions; no gross abnormalities  	· Neck	No bruits; no thyromegaly or nodules   	· Back	No deformity or limitation of movement   	· Respiratory	Breath Sounds equal & clear to percussion & auscultation, no accessory muscle use  	· Cardiovascular	Regular rate & rhythm, normal S1, S2; no murmurs, gallops or rubs; no S3, S4  	· Gastrointestinal	Soft,  no hepatosplenomegaly, normal bowel sounds  	                              tenderness over epigastric and substernal area   	· Extremities	No cyanosis, clubbing or edema   	· Neurological	Alert & oriented; no sensory, motor or coordination deficits, normal reflexes  · Musculoskeletal	No joint pain, swelling or deformity; no limitation of movement        LABS:  CBC Full  -  ( 29 Dec 2019 02:15 )  WBC Count : 20.11 K/uL  RBC Count : 4.81 M/uL  Hemoglobin : 14.0 g/dL  Hematocrit : 42.9 %  Platelet Count - Automated : 458 K/uL  Mean Cell Volume : 89.2 fl  Mean Cell Hemoglobin : 29.1 pg  Mean Cell Hemoglobin Concentration : 32.6 gm/dL  Auto Neutrophil # : 17.44 K/uL  Auto Lymphocyte # : 1.02 K/uL  Auto Monocyte # : 1.47 K/uL  Auto Eosinophil # : 0.00 K/uL  Auto Basophil # : 0.02 K/uL  Auto Neutrophil % : 86.7 %  Auto Lymphocyte % : 5.1 %  Auto Monocyte % : 7.3 %  Auto Eosinophil % : 0.0 %  Auto Basophil % : 0.1 %    12-29    139  |  105  |  17  ----------------------------<  412<H>  4.0   |  8<LL>  |  1.39<H>    Ca    9.2      29 Dec 2019 02:15    TPro  9.5<H>  /  Alb  4.6  /  TBili  0.5  /  DBili  x   /  AST  23  /  ALT  28  /  AlkPhos  140<H>  12-29        [ ]GOALS OF CARE DISCUSSION WITH PATIENT/FAMILY/PROXY: full code    CRITICAL CARE TIME SPENT: 35 minutes

## 2019-12-29 NOTE — CONSULT NOTE ADULT - SUBJECTIVE AND OBJECTIVE BOX
Patient is a 34y old  Female who presents with a chief complaint of DKA (29 Dec 2019 06:21)      HPI:  34/F with DM type I (on tresiba 42 units in AM and novolog 15 units pre-meal), last DKA year ago presented to ED with abdominal pain, vomiting and diarrhea x 2 days. Patient reports having vomiting and abdominal pain starting friday after which she was taken to hospital and was discharge without any blood work. Since then her vomiting and diarrhea worsened and she was not able to tolerate PO so she came to ED today. She also reports epigastric and chest pain associated with cough and vomiting. Patient reports that her last HbA1c level was above 12 and sugars are always hard to control and her endocrinologist is in process of getting pump/ CGM.   On arrival to ed labs significant for WBC of 20.11, BUN/Cr of 17/1.39 (baseline of 6/0.49), AG of 26 and blood glucose level of 412. She was given zofran and started on insulin drip. (29 Dec 2019 03:41). found to be in DKA. Last insulin dose 2 days ago. Denies hypoglycemia. Last A1c about 11%.       PAST MEDICAL & SURGICAL HISTORY:  Depression, unspecified depression type  Asthma, unspecified asthma severity, unspecified whether complicated, unspecified whether persistent  HSV-2 infection  HSV-1 infection  GERD (gastroesophageal reflux disease)  Neuropathy  Insulin dependent diabetes mellitus  Diabetes mellitus type 1  S/P endoscopy  H/O colonoscopy  S/P cataract surgery  H/O tubal ligation  History of   S/P cholecystectomy  Insulin pump in place         MEDICATIONS  (STANDING):  chlorhexidine 4% Liquid 1 Application(s) Topical <User Schedule>  dextrose 5% + sodium chloride 0.45% with potassium chloride 20 mEq/L 1000 milliLiter(s) (150 mL/Hr) IV Continuous <Continuous>  heparin  Injectable 5000 Unit(s) SubCutaneous every 8 hours  insulin regular Infusion 4.5 Unit(s)/Hr (4.5 mL/Hr) IV Continuous <Continuous>  pantoprazole    Tablet 40 milliGRAM(s) Oral before breakfast    MEDICATIONS  (PRN):  acetaminophen    Suspension .. 650 milliGRAM(s) Oral every 6 hours PRN Temp greater or equal to 38C (100.4F), Mild Pain (1 - 3)  aluminum hydroxide/magnesium hydroxide/simethicone Suspension 30 milliLiter(s) Oral every 6 hours PRN Dyspepsia  ondansetron Injectable 4 milliGRAM(s) IV Push every 6 hours PRN Nausea and/or Vomiting      FAMILY HISTORY:  No pertinent family history in first degree relatives      SOCIAL HISTORY:      REVIEW OF SYSTEMS:  CONSTITUTIONAL: No fever, weight loss, or fatigue  EYES: No eye pain, visual disturbances, or discharge  ENT:  No difficulty hearing, tinnitus, vertigo; No sinus or throat pain  NECK: No pain or stiffness  RESPIRATORY: No cough, wheezing, chills or hemoptysis; No Shortness of Breath  CARDIOVASCULAR: No chest pain, palpitations, passing out, dizziness, or leg swelling  GASTROINTESTINAL: No abdominal or epigastric pain. No nausea, vomiting, or hematemesis; No diarrhea or constipation. No melena or hematochezia.  GENITOURINARY: No dysuria, frequency, hematuria, or incontinence  NEUROLOGICAL: No headaches, memory loss, loss of strength, numbness, or tremors  SKIN: No itching, burning, rashes, or lesions   LYMPH Nodes: No enlarged glands  ENDOCRINE: No heat or cold intolerance; No hair loss  MUSCULOSKELETAL: No joint pain or swelling; No muscle, back, or extremity pain  PSYCHIATRIC: No depression, anxiety, mood swings, or difficulty sleeping  HEME/LYMPH: No easy bruising, or bleeding gums  ALLERGY AND IMMUNOLOGIC: No hives or eczema	        Vital Signs Last 24 Hrs  T(C): 37.2 (29 Dec 2019 09:00), Max: 37.3 (29 Dec 2019 08:00)  T(F): 99 (29 Dec 2019 09:00), Max: 99.1 (29 Dec 2019 08:00)  HR: 109 (29 Dec 2019 11:00) (109 - 131)  BP: 96/67 (29 Dec 2019 11:00) (96/67 - 112/72)  BP(mean): 74 (29 Dec 2019 11:00) (72 - 81)  RR: 16 (29 Dec 2019 11:00) (14 - 18)  SpO2: 100% (29 Dec 2019 11:00) (99% - 100%)      Constitutional:    HEENT: nad    Neck:  No JVD, bruits or thyromegaly    Respiratory:  Clear without rales or rhonchi    Cardiovascular:  RR without murmur, rub or gallop.    Gastrointestinal: Soft without hepatosplenomegaly.    Extremities: without cyanosis, clubbing or edema.    Neurological:  Oriented   x    3  . No gross sensory or motor defects.        LABS:                        11.8   14.57 )-----------( 387      ( 29 Dec 2019 08:23 )             35.3         142  |  115<H>  |  13  ----------------------------<  173<H>  4.0   |  15<L>  |  1.01    Ca    8.6      29 Dec 2019 08:23  Phos  1.0       Mg     2.1         TPro  9.5<H>  /  Alb  4.6  /  TBili  0.5  /  DBili  x   /  AST  23  /  ALT  28  /  AlkPhos  140<H>      CARDIAC MARKERS ( 29 Dec 2019 02:15 )  <0.015 ng/mL / x     / 241 U/L / x     / 3.0 ng/mL        Urinalysis Basic - ( 29 Dec 2019 08:35 )    Color: Yellow / Appearance: Clear / S.020 / pH: x  Gluc: x / Ketone: Large  / Bili: Negative / Urobili: Negative   Blood: x / Protein: 100 / Nitrite: Negative   Leuk Esterase: Negative / RBC: 2-5 /HPF / WBC 0-2 /HPF   Sq Epi: x / Non Sq Epi: Moderate /HPF / Bacteria: Trace /HPF      CAPILLARY BLOOD GLUCOSE      POCT Blood Glucose.: 200 mg/dL (29 Dec 2019 11:06)  POCT Blood Glucose.: 204 mg/dL (29 Dec 2019 10:04)  POCT Blood Glucose.: 207 mg/dL (29 Dec 2019 09:10)  POCT Blood Glucose.: 162 mg/dL (29 Dec 2019 08:14)  POCT Blood Glucose.: 175 mg/dL (29 Dec 2019 06:58)  POCT Blood Glucose.: 180 mg/dL (29 Dec 2019 06:06)  POCT Blood Glucose.: 227 mg/dL (29 Dec 2019 05:04)  POCT Blood Glucose.: 319 mg/dL (29 Dec 2019 02:27)      RADIOLOGY & ADDITIONAL STUDIES:

## 2019-12-29 NOTE — H&P ADULT - HISTORY OF PRESENT ILLNESS
34/F with DM type I (on tresiba 42 units in AM and novolog 15 units pre-meal), last DKA year ago presented to ED with abdominal pain, vomiting and diarrhea x 2 days. Patient reports having vomiting and abdominal pain starting friday after which 34/F with DM type I (on tresiba 42 units in AM and novolog 15 units pre-meal), last DKA year ago presented to ED with abdominal pain, vomiting and diarrhea x 2 days. Patient reports having vomiting and abdominal pain starting friday after which she was taken to hospital and was discharge without any blood work. Since then her vomiting and diarrhea worsened and she was not able to tolerate PO so she came to ED today. She also reports epigastric and chest pain associated with cough and vomiting. Patient reports that her last HbA1c level was above 12 and sugars are always hard to control and her endocrinologist is in process of getting pump/ CGM.   On arrival to ed labs significant for WBC of 20.11, BUN/Cr of 17/1.39 (baseline of 6/0.49), AG of 26 and blood glucose level of 412. She was given zofran and started on insulin drip.

## 2019-12-29 NOTE — ED ADULT NURSE NOTE - OBJECTIVE STATEMENT
Pt stated she has stomach pain with nausea and vomiting since Friday and she to went to a hospital and they did not do any blood works.

## 2019-12-29 NOTE — CONSULT NOTE ADULT - PROBLEM SELECTOR RECOMMENDATION 9
improving  may d/c insulin drip when hco3 >15  start diet and advance as tolerated  start lantus 30 units  Humalog 6 ac tid when on diet  fsg ac and hs  consider pump tx as out pt  d/w hs

## 2019-12-29 NOTE — H&P ADULT - ASSESSMENT
34/F with DM type I (on tresiba 42 units in AM and novolog 15 units pre-meal), last DKA year ago presented to ED with abdominal pain, vomiting and diarrhea x 2 days. Patient reports having vomiting and abdominal pain starting friday after which she was taken to hospital and was discharge without any blood work. Since then her vomiting and diarrhea worsened and she was not able to tolerate PO so she came to ED today. She also reports epigastric and chest pain associated with cough and vomiting. Patient reports that her last HbA1c level was above 12 and sugars are always hard to control and her endocrinologist is in process of getting pump/ CGM.   On arrival to ed labs significant for WBC of 20.11, BUN/Cr of 17/1.39 (baseline of 6/0.49), AG of 26 and blood glucose level of 412. She was given zofran and started on insulin drip.      Patient will be admitted to ICU for DKA.    Plan:     Neuro:   baseline AAOx3   no active issues    Respi:   history on asthma   not on any medications   no acute issues at this time     CVS:   # chest pain   reports epigastric and substernal chest  pain - pain reproducible   likely form vomiting   EKG sinus tachy with T wave inversion in V3-V5  cardiac enzymes x1 negative - follow CE x2   follow chest xray      GI:   # Gastroenteritis:   presented with vomiting and diarrhea   follow stool ova/parasite, GI PCR and stool culture   continue with hydration   NPO for now     Heme:   Leukocytosis of 20.11  likely stress induced in setting of DKA   gastroenteritis vs flu  trend CBC     Endo:   # DKA:   presented with vomiting, diarrhea, abdominal pain and inability to tolerate PO  AG of 26, blood glucose of 412, pH of 7.12  s/p regular insulin 4.5 subq in ed and started on insulin drip   s/p 2 l normal saline bolus   start on LR+KCL at 150 cc/hour   continue with insulin drip - with DKA protocol   NPO until gap closes   BMP q4   Consult Endo Dr. Peralta in AM     ID:  Gastroenteritis vs flu   likely viral etiology   WBC of 20.11  will obtain UA, blood culture, flu panel, stool studies   will hold on antibiotics at this time  follow chest xray     Renal:   presented with BUN/Cr of 17/1.39 (baseline of 6/0.49)  pre-renal from dehydration   continue aggressive hydration   trend renal function     PPx:   heparin subq for DVT prophylaxis and protonix daily for GI  proph

## 2019-12-29 NOTE — ED PROVIDER NOTE - CLINICAL SUMMARY MEDICAL DECISION MAKING FREE TEXT BOX
33 y/o F patient with abdominal pain, nausea, vomiting since Friday night. Will do abdominal labs, UA, ketones, and reassess.

## 2019-12-29 NOTE — ED PROVIDER NOTE - OBJECTIVE STATEMENT
33 y/o F patient with a significant PMHx of DM and no significant PSHx presents to the ED with c/o abdominal pain, nausea, vomiting since Friday night. Patient states that the pain is close to the chest pain and epigastrium. Patient reports that she is unable to tolerate PO. Patient denies any fever, chills, confusion, urinary symptoms, vaginal bleeding, discharge or any other complains

## 2019-12-29 NOTE — CONSULT NOTE ADULT - ASSESSMENT
34/F with DM type I (on tresiba 42 units in AM and novolog 15 units pre-meal), last DKA year ago presented to ED with abdominal pain, vomiting and diarrhea x 2 days.  found to be in DKA. Last insulin dose 2 days ago. Denies hypoglycemia. Last A1c about 11%.

## 2019-12-29 NOTE — H&P ADULT - ATTENDING COMMENTS
MICU ATTENDING. I have personally seen and examined the pt I was physically present fro the key elements service provided. I agree with above history, physical and plan which I edited where appropriate. I total sp[ent 35 min critical care time.   25 with PMH of DM type I, Depression, GERD presented with abd/chest pain, vomiting, diarrhea. ER w/u revealed DKA with ARF. Admitted to ICU for managements   A/P DM, DKA   Dehydration   ARF  Gastroenteritis   MICU   NPO  Hydration  Iv insulin, FS q 1 hours  IC Protonix, ZOfran   ECG, Serial CE  Panculture   Stool studies  DVT/GI prophylaxis

## 2019-12-29 NOTE — H&P ADULT - NSHPPHYSICALEXAM_GEN_ALL_CORE
· Constitutional	Well-developed, well nourished  · Eyes	conjuctivae clear  · ENMT	No oral lesions; no gross abnormalities  · Neck	No bruits; no thyromegaly or nodules   · Back	No deformity or limitation of movement   · Respiratory	Breath Sounds equal & clear to percussion & auscultation, no accessory muscle use  · Cardiovascular	Regular rate & rhythm, normal S1, S2; no murmurs, gallops or rubs; no S3, S4  · Gastrointestinal	Soft,  no hepatosplenomegaly, normal bowel sounds                                tenderness over epigastric and substernal area   · Extremities	No cyanosis, clubbing or edema   · Neurological	Alert & oriented; no sensory, motor or coordination deficits, normal reflexes  · Musculoskeletal	No joint pain, swelling or deformity; no limitation of movement

## 2019-12-29 NOTE — ED PROVIDER NOTE - PROGRESS NOTE DETAILS
Evidence of DKA on laboratory workup. Will start insulin gtt and bolus. Patient accepted for admission by ICU team.

## 2019-12-29 NOTE — PROGRESS NOTE ADULT - ASSESSMENT
34/F with DM type I (on tresiba 42 units in AM and novolog 15 units pre-meal), last DKA year ago presented to ED with abdominal pain, vomiting and diarrhea x 2 days. Patient reports having vomiting and abdominal pain starting friday after which she was taken to hospital and was discharge without any blood work. Since then her vomiting and diarrhea worsened and she was not able to tolerate PO so she came to ED today. She also reports epigastric and chest pain associated with cough and vomiting. Patient reports that her last HbA1c level was above 12 and sugars are always hard to control and her endocrinologist is in process of getting pump/ CGM.   On arrival to ed labs significant for WBC of 20.11, BUN/Cr of 17/1.39 (baseline of 6/0.49), AG of 26 and blood glucose level of 412. She was given zofran and started on insulin drip.      Patient will be admitted to ICU for DKA.    Plan:     Neuro:   baseline AAOx3   no active issues    Respi:   history on asthma   not on any medications   no acute issues at this time     CVS:   # chest pain   reports epigastric and substernal chest  pain - pain reproducible   likely form vomiting   EKG sinus tachy with T wave inversion in V3-V5  cardiac enzymes x1 negative - follow CE x2   follow chest xray      GI:   # Gastroenteritis:   presented with vomiting and diarrhea   follow stool ova/parasite, GI PCR and stool culture   continue with hydration   NPO for now     Heme:   Leukocytosis of 20.11  likely stress induced in setting of DKA   gastroenteritis vs flu  trend CBC     Endo:   # DKA:   presented with vomiting, diarrhea, abdominal pain and inability to tolerate PO  AG of 26, blood glucose of 412, pH of 7.12  s/p regular insulin 4.5 subq in ed and started on insulin drip   s/p 2 l normal saline bolus   start on LR+KCL at 150 cc/hour   continue with insulin drip - with DKA protocol   NPO until gap closes   BMP q4   Consult Endo Dr. Peralta in AM     ID:  Gastroenteritis vs flu   likely viral etiology   WBC of 20.11  will obtain UA, blood culture, flu panel, stool studies   will hold on antibiotics at this time  follow chest xray     Renal:   presented with BUN/Cr of 17/1.39 (baseline of 6/0.49)  pre-renal from dehydration   continue aggressive hydration   trend renal function     PPx:   heparin subq for DVT prophylaxis and protonix daily for GI  proph 34/F with DM type I (on tresiba 42 units in AM and novolog 15 units pre-meal), last DKA year ago presented to ED with abdominal pain, vomiting and diarrhea x 2 days. Patient reports having vomiting and abdominal pain starting friday after which she was taken to hospital and was discharge without any blood work. Since then her vomiting and diarrhea worsened and she was not able to tolerate PO so she came to ED today. She also reports epigastric and chest pain associated with cough and vomiting. Patient reports that her last HbA1c level was above 12 and sugars are always hard to control and her endocrinologist is in process of getting pump/ CGM.   On arrival to ed labs significant for WBC of 20.11, BUN/Cr of 17/1.39 (baseline of 6/0.49), AG of 26 and blood glucose level of 412. She was given zofran and started on insulin drip.      Patient will be admitted to ICU for DKA.    Plan:     Neuro:   baseline AAOx3   no active issues    Respi:   history on asthma   not on any medications   no acute issues at this time     CVS:   # chest pain   reports epigastric and substernal chest  pain - pain reproducible   likely form vomiting   EKG sinus tachy with T wave inversion in V3-V5  cardiac enzymes x1 negative - follow CE x2   follow chest xray      GI:   # Gastroenteritis:   presented with vomiting and diarrhea   follow stool ova/parasite, GI PCR and stool culture   continue with hydration   NPO for now, start PO diet, once she is able to eat.     Heme:   Leukocytosis of 20.11  likely stress induced in setting of DKA   gastroenteritis vs flu  trend CBC     Endo:   # DKA:   presented with vomiting, diarrhea, abdominal pain and inability to tolerate PO  AG of 26, blood glucose of 412, pH of 7.12  s/p regular insulin 4.5 subq in ed and started on insulin drip   s/p 2 l normal saline bolus   Initially started on LR+KCL at 150 cc/hour   FS: trended down to 150s, IV fluids changed to half NS and D5 with K supplement,.  continue with insulin drip - with DKA protocol    Gap is closed now. Will start PO diet once she is able to tolerate.   BMP q4   Consult Endo Dr. Peralta consulted     ID:  Gastroenteritis vs flu   likely viral etiology   WBC of 20.11  will obtain UA, blood culture, flu panel, stool studies   will hold on antibiotics at this time  follow chest xray     Renal:   presented with BUN/Cr of 17/1.39 (baseline of 6/0.49)  pre-renal from dehydration   continue aggressive hydration   trend renal function     PPx:   heparin subq for DVT prophylaxis and protonix daily for GI  proph

## 2019-12-29 NOTE — H&P ADULT - NSHPLABSRESULTS_GEN_ALL_CORE
Vital Signs Last 24 Hrs  T(C): 36.7 (29 Dec 2019 00:57), Max: 36.7 (29 Dec 2019 00:57)  T(F): 98 (29 Dec 2019 00:57), Max: 98 (29 Dec 2019 00:57)  HR: 131 (29 Dec 2019 00:57) (131 - 131)  BP: 106/73 (29 Dec 2019 00:57) (106/73 - 106/73)  BP(mean): --  RR: 16 (29 Dec 2019 00:57) (16 - 16)  SpO2: 100% (29 Dec 2019 00:57) (100% - 100%)                            14.0   20.11 )-----------( 458      ( 29 Dec 2019 02:15 )             42.9       12-29    139  |  105  |  17  ----------------------------<  412<H>  4.0   |  8<LL>  |  1.39<H>    Ca    9.2      29 Dec 2019 02:15    TPro  9.5<H>  /  Alb  4.6  /  TBili  0.5  /  DBili  x   /  AST  23  /  ALT  28  /  AlkPhos  140<H>  12-29                      Lactate Trend            CAPILLARY BLOOD GLUCOSE      POCT Blood Glucose.: 319 mg/dL (29 Dec 2019 02:27)

## 2019-12-30 LAB
ALBUMIN SERPL ELPH-MCNC: 2.7 G/DL — LOW (ref 3.5–5)
ALP SERPL-CCNC: 86 U/L — SIGNIFICANT CHANGE UP (ref 40–120)
ALT FLD-CCNC: 19 U/L DA — SIGNIFICANT CHANGE UP (ref 10–60)
ANION GAP SERPL CALC-SCNC: 8 MMOL/L — SIGNIFICANT CHANGE UP (ref 5–17)
AST SERPL-CCNC: 20 U/L — SIGNIFICANT CHANGE UP (ref 10–40)
BASOPHILS # BLD AUTO: 0.02 K/UL — SIGNIFICANT CHANGE UP (ref 0–0.2)
BASOPHILS NFR BLD AUTO: 0.2 % — SIGNIFICANT CHANGE UP (ref 0–2)
BILIRUB SERPL-MCNC: 0.6 MG/DL — SIGNIFICANT CHANGE UP (ref 0.2–1.2)
BUN SERPL-MCNC: 3 MG/DL — LOW (ref 7–18)
CALCIUM SERPL-MCNC: 7.9 MG/DL — LOW (ref 8.4–10.5)
CHLORIDE SERPL-SCNC: 115 MMOL/L — HIGH (ref 96–108)
CO2 SERPL-SCNC: 19 MMOL/L — LOW (ref 22–31)
CREAT SERPL-MCNC: 0.53 MG/DL — SIGNIFICANT CHANGE UP (ref 0.5–1.3)
EOSINOPHIL # BLD AUTO: 0.01 K/UL — SIGNIFICANT CHANGE UP (ref 0–0.5)
EOSINOPHIL NFR BLD AUTO: 0.1 % — SIGNIFICANT CHANGE UP (ref 0–6)
GLUCOSE BLDC GLUCOMTR-MCNC: 123 MG/DL — HIGH (ref 70–99)
GLUCOSE BLDC GLUCOMTR-MCNC: 128 MG/DL — HIGH (ref 70–99)
GLUCOSE BLDC GLUCOMTR-MCNC: 135 MG/DL — HIGH (ref 70–99)
GLUCOSE BLDC GLUCOMTR-MCNC: 154 MG/DL — HIGH (ref 70–99)
GLUCOSE BLDC GLUCOMTR-MCNC: 156 MG/DL — HIGH (ref 70–99)
GLUCOSE BLDC GLUCOMTR-MCNC: 164 MG/DL — HIGH (ref 70–99)
GLUCOSE BLDC GLUCOMTR-MCNC: 168 MG/DL — HIGH (ref 70–99)
GLUCOSE BLDC GLUCOMTR-MCNC: 86 MG/DL — SIGNIFICANT CHANGE UP (ref 70–99)
GLUCOSE SERPL-MCNC: 163 MG/DL — HIGH (ref 70–99)
HCT VFR BLD CALC: 29.4 % — LOW (ref 34.5–45)
HGB BLD-MCNC: 9.7 G/DL — LOW (ref 11.5–15.5)
IMM GRANULOCYTES NFR BLD AUTO: 0.4 % — SIGNIFICANT CHANGE UP (ref 0–1.5)
LYMPHOCYTES # BLD AUTO: 2.59 K/UL — SIGNIFICANT CHANGE UP (ref 1–3.3)
LYMPHOCYTES # BLD AUTO: 31.3 % — SIGNIFICANT CHANGE UP (ref 13–44)
MAGNESIUM SERPL-MCNC: 1.8 MG/DL — SIGNIFICANT CHANGE UP (ref 1.6–2.6)
MAGNESIUM SERPL-MCNC: 1.9 MG/DL — SIGNIFICANT CHANGE UP (ref 1.6–2.6)
MAGNESIUM SERPL-MCNC: 2 MG/DL — SIGNIFICANT CHANGE UP (ref 1.6–2.6)
MCHC RBC-ENTMCNC: 28.8 PG — SIGNIFICANT CHANGE UP (ref 27–34)
MCHC RBC-ENTMCNC: 33 GM/DL — SIGNIFICANT CHANGE UP (ref 32–36)
MCV RBC AUTO: 87.2 FL — SIGNIFICANT CHANGE UP (ref 80–100)
MONOCYTES # BLD AUTO: 0.65 K/UL — SIGNIFICANT CHANGE UP (ref 0–0.9)
MONOCYTES NFR BLD AUTO: 7.9 % — SIGNIFICANT CHANGE UP (ref 2–14)
NEUTROPHILS # BLD AUTO: 4.98 K/UL — SIGNIFICANT CHANGE UP (ref 1.8–7.4)
NEUTROPHILS NFR BLD AUTO: 60.1 % — SIGNIFICANT CHANGE UP (ref 43–77)
NRBC # BLD: 0 /100 WBCS — SIGNIFICANT CHANGE UP (ref 0–0)
PHOSPHATE SERPL-MCNC: 2.3 MG/DL — LOW (ref 2.5–4.5)
PHOSPHATE SERPL-MCNC: 2.3 MG/DL — LOW (ref 2.5–4.5)
PHOSPHATE SERPL-MCNC: 3 MG/DL — SIGNIFICANT CHANGE UP (ref 2.5–4.5)
PLATELET # BLD AUTO: 260 K/UL — SIGNIFICANT CHANGE UP (ref 150–400)
POTASSIUM SERPL-MCNC: 3.5 MMOL/L — SIGNIFICANT CHANGE UP (ref 3.5–5.3)
POTASSIUM SERPL-SCNC: 3.5 MMOL/L — SIGNIFICANT CHANGE UP (ref 3.5–5.3)
PROT SERPL-MCNC: 5.6 G/DL — LOW (ref 6–8.3)
RBC # BLD: 3.37 M/UL — LOW (ref 3.8–5.2)
RBC # FLD: 14.6 % — HIGH (ref 10.3–14.5)
SODIUM SERPL-SCNC: 142 MMOL/L — SIGNIFICANT CHANGE UP (ref 135–145)
WBC # BLD: 8.28 K/UL — SIGNIFICANT CHANGE UP (ref 3.8–10.5)
WBC # FLD AUTO: 8.28 K/UL — SIGNIFICANT CHANGE UP (ref 3.8–10.5)

## 2019-12-30 PROCEDURE — 99232 SBSQ HOSP IP/OBS MODERATE 35: CPT | Mod: GC

## 2019-12-30 RX ORDER — INSULIN GLARGINE 100 [IU]/ML
30 INJECTION, SOLUTION SUBCUTANEOUS EVERY MORNING
Refills: 0 | Status: DISCONTINUED | OUTPATIENT
Start: 2019-12-30 | End: 2019-12-31

## 2019-12-30 RX ORDER — METOCLOPRAMIDE HCL 10 MG
10 TABLET ORAL ONCE
Refills: 0 | Status: DISCONTINUED | OUTPATIENT
Start: 2019-12-30 | End: 2019-12-30

## 2019-12-30 RX ORDER — METOCLOPRAMIDE HCL 10 MG
10 TABLET ORAL ONCE
Refills: 0 | Status: COMPLETED | OUTPATIENT
Start: 2019-12-30 | End: 2019-12-30

## 2019-12-30 RX ORDER — POTASSIUM PHOSPHATE, MONOBASIC POTASSIUM PHOSPHATE, DIBASIC 236; 224 MG/ML; MG/ML
15 INJECTION, SOLUTION INTRAVENOUS ONCE
Refills: 0 | Status: COMPLETED | OUTPATIENT
Start: 2019-12-30 | End: 2019-12-30

## 2019-12-30 RX ORDER — INSULIN LISPRO 100/ML
VIAL (ML) SUBCUTANEOUS
Refills: 0 | Status: DISCONTINUED | OUTPATIENT
Start: 2019-12-30 | End: 2019-12-31

## 2019-12-30 RX ORDER — INSULIN LISPRO 100/ML
6 VIAL (ML) SUBCUTANEOUS
Refills: 0 | Status: DISCONTINUED | OUTPATIENT
Start: 2019-12-30 | End: 2019-12-30

## 2019-12-30 RX ADMIN — Medication 30 MILLILITER(S): at 06:10

## 2019-12-30 RX ADMIN — DEXTROSE MONOHYDRATE, SODIUM CHLORIDE, AND POTASSIUM CHLORIDE 150 MILLILITER(S): 50; .745; 4.5 INJECTION, SOLUTION INTRAVENOUS at 06:10

## 2019-12-30 RX ADMIN — ONDANSETRON 4 MILLIGRAM(S): 8 TABLET, FILM COATED ORAL at 21:40

## 2019-12-30 RX ADMIN — HEPARIN SODIUM 5000 UNIT(S): 5000 INJECTION INTRAVENOUS; SUBCUTANEOUS at 21:16

## 2019-12-30 RX ADMIN — POTASSIUM PHOSPHATE, MONOBASIC POTASSIUM PHOSPHATE, DIBASIC 62.5 MILLIMOLE(S): 236; 224 INJECTION, SOLUTION INTRAVENOUS at 09:35

## 2019-12-30 RX ADMIN — Medication 30 MILLILITER(S): at 11:46

## 2019-12-30 RX ADMIN — PANTOPRAZOLE SODIUM 40 MILLIGRAM(S): 20 TABLET, DELAYED RELEASE ORAL at 06:10

## 2019-12-30 RX ADMIN — Medication 6 UNIT(S): at 12:00

## 2019-12-30 RX ADMIN — HEPARIN SODIUM 5000 UNIT(S): 5000 INJECTION INTRAVENOUS; SUBCUTANEOUS at 14:04

## 2019-12-30 RX ADMIN — Medication 10 MILLIGRAM(S): at 13:56

## 2019-12-30 RX ADMIN — CHLORHEXIDINE GLUCONATE 1 APPLICATION(S): 213 SOLUTION TOPICAL at 06:10

## 2019-12-30 RX ADMIN — HEPARIN SODIUM 5000 UNIT(S): 5000 INJECTION INTRAVENOUS; SUBCUTANEOUS at 06:10

## 2019-12-30 RX ADMIN — INSULIN GLARGINE 30 UNIT(S): 100 INJECTION, SOLUTION SUBCUTANEOUS at 09:34

## 2019-12-30 NOTE — CHART NOTE - NSCHARTNOTEFT_GEN_A_CORE
34/F with DM type I (on tresiba 42 units in AM and novolog 15 units pre-meal), last DKA year ago presented to ED with abdominal pain, vomiting and diarrhea x 2 days. Patient reports having vomiting and abdominal pain starting friday after which she was taken to hospital and was discharge without any blood work. Since then her vomiting and diarrhea worsened and she was not able to tolerate PO so she came to ED today. She also reports epigastric and chest pain associated with cough and vomiting. Patient reports that her last HbA1c level was above 12 and sugars are always hard to control and her endocrinologist is in process of getting pump/ CGM.   On arrival to ed labs significant for WBC of 20.11, BUN/Cr of 17/1.39 (baseline of 6/0.49), AG of 26 and blood glucose level of 412. She was given zofran and started on insulin drip.      Patient will be admitted to ICU for DKA 2/2 gastroenteritis     1. DKA:   She presented with vomiting, diarrhea, abdominal pain and inability to tolerate PO. AG of 26, blood glucose of 412, pH of 7.12. s/p regular insulin 4.5 subq in ed and started on insulin drip, s/p 2 l normal saline bolus. anion gap closed on 12/29 but insulin drip was continued on 12/29 as she was unable to tolerate PO diet. on 12/30, patient nausea improved, bridges in AM with Lantus 30 units.   started on Lantus 30 units in AM, 6 units humalog pre meal with ss coverage.   At home,  she takes Tresiba 42 units, Novolog 15 units pre meal. Follow Dr Peralta for further adjustment of insulin dosage.     2. Gastroenteritis:   presented with vomiting and diarrhea   Diarrhea resolved with conservative management,     3. history on asthma   not on any medications   no acute issues at this time     4. PPx:   heparin subq for DVT prophylaxis and protonix daily for GI  proph    Patient is medically stable for downgrade and case is discussed with the ICU attending and signed out to floor team     Things to follow:   Monitor FS.

## 2019-12-30 NOTE — PROGRESS NOTE ADULT - SUBJECTIVE AND OBJECTIVE BOX
Interval Events:      Allergies    amoxicillin (Rash)    Intolerances      Endocrine/Metabolic Medications:  insulin glargine Injectable (LANTUS) 30 Unit(s) SubCutaneous every morning  insulin lispro (HumaLOG) corrective regimen sliding scale   SubCutaneous Before meals and at bedtime  insulin lispro Injectable (HumaLOG) 6 Unit(s) SubCutaneous three times a day before meals      Vital Signs Last 24 Hrs  T(C): 36.3 (30 Dec 2019 04:00), Max: 37.3 (29 Dec 2019 15:00)  T(F): 97.4 (30 Dec 2019 04:00), Max: 99.2 (29 Dec 2019 18:00)  HR: 89 (30 Dec 2019 11:00) (86 - 111)  BP: 127/91 (30 Dec 2019 11:00) (85/56 - 136/81)  BP(mean): 100 (30 Dec 2019 11:00) (62 - 100)  RR: 16 (30 Dec 2019 11:00) (13 - 24)  SpO2: 100% (30 Dec 2019 11:00) (99% - 100%)      PHYSICAL EXAM  All physical exam findings normal, except those marked:  General:	Alert, active, cooperative, NAD, well hydrated  .		[] Abnormal:  Neck		Normal: supple, no cervical adenopathy, no palpable thyroid  .		[] Abnormal:  Cardiovascular	Normal: regular rate, normal S1, S2, no murmurs  .		[] Abnormal:  Respiratory	Normal: no chest wall deformity, normal respiratory pattern, CTA B/L  .		[] Abnormal:  Abdominal	Normal: soft, ND, NT, bowel sounds present, no masses, no organomegaly  .		[] Abnormal:  		Normal normal genitalia, testes descended, circumcised/uncircumcised  .		Yady stage:			Breast yady:  .		Menstrual history:  .		[] Abnormal:  Extremities	Normal: FROM x4  .		[] Abnormal:  Skin		Normal: intact and not indurated, no rash, no acanthosis nigricans  .		[] Abnormal:  Neurologic	Normal: grossly intact  .		[] Abnormal:    LABS                        9.7    8.28  )-----------( 260      ( 30 Dec 2019 06:13 )             29.4                               142    |  115    |  3                   Calcium: 7.9   / iCa: x      (12-30 @ 06:13)    ----------------------------<  163       Magnesium: 2.0                              3.5     |  19     |  0.53             Phosphorous: 2.3      TPro  5.6    /  Alb  2.7    /  TBili  0.6    /  DBili  x      /  AST  20     /  ALT  19     /  AlkPhos  86     30 Dec 2019 06:13    CAPILLARY BLOOD GLUCOSE      POCT Blood Glucose.: 123 mg/dL (30 Dec 2019 11:22)  POCT Blood Glucose.: 156 mg/dL (30 Dec 2019 08:55)  POCT Blood Glucose.: 164 mg/dL (30 Dec 2019 06:48)  POCT Blood Glucose.: 168 mg/dL (30 Dec 2019 04:58)  POCT Blood Glucose.: 135 mg/dL (30 Dec 2019 03:08)  POCT Blood Glucose.: 154 mg/dL (30 Dec 2019 01:02)  POCT Blood Glucose.: 130 mg/dL (29 Dec 2019 23:19)  POCT Blood Glucose.: 142 mg/dL (29 Dec 2019 22:23)  POCT Blood Glucose.: 142 mg/dL (29 Dec 2019 21:08)  POCT Blood Glucose.: 183 mg/dL (29 Dec 2019 20:04)  POCT Blood Glucose.: 217 mg/dL (29 Dec 2019 18:52)  POCT Blood Glucose.: 181 mg/dL (29 Dec 2019 18:14)  POCT Blood Glucose.: 198 mg/dL (29 Dec 2019 16:11)  POCT Blood Glucose.: 160 mg/dL (29 Dec 2019 15:01)  POCT Blood Glucose.: 192 mg/dL (29 Dec 2019 14:07)  POCT Blood Glucose.: 222 mg/dL (29 Dec 2019 12:51)  POCT Blood Glucose.: 230 mg/dL (29 Dec 2019 12:05)        Assesment/plan Interval Events: Pt feels well , no nausea .vomiting or abd pain . Tolerated diet this am . pt c/o excessive gas/burping. Was started on Lantus 30 U in the morning. Insulin drip was stopped around 11 am.   BS well controlled.      Allergies    amoxicillin (Rash)    Intolerances      Endocrine/Metabolic Medications:  insulin glargine Injectable (LANTUS) 30 Unit(s) SubCutaneous every morning  insulin lispro (HumaLOG) corrective regimen sliding scale   SubCutaneous Before meals and at bedtime  insulin lispro Injectable (HumaLOG) 6 Unit(s) SubCutaneous three times a day before meals      Vital Signs Last 24 Hrs  T(C): 36.3 (30 Dec 2019 04:00), Max: 37.3 (29 Dec 2019 15:00)  T(F): 97.4 (30 Dec 2019 04:00), Max: 99.2 (29 Dec 2019 18:00)  HR: 89 (30 Dec 2019 11:00) (86 - 111)  BP: 127/91 (30 Dec 2019 11:00) (85/56 - 136/81)  BP(mean): 100 (30 Dec 2019 11:00) (62 - 100)  RR: 16 (30 Dec 2019 11:00) (13 - 24)  SpO2: 100% (30 Dec 2019 11:00) (99% - 100%)      PHYSICAL EXAM  All physical exam findings normal, except those marked:  General:	Alert, active, cooperative, NAD, well hydrated  .		[] Abnormal:  Neck		Normal: supple, no cervical adenopathy, no palpable thyroid  .		[] Abnormal:  Cardiovascular	Normal: regular rate, normal S1, S2, no murmurs  .		[] Abnormal:  Respiratory	Normal: no chest wall deformity, normal respiratory pattern, CTA B/L  .		[] Abnormal:  Abdominal	Normal: soft, ND, NT, bowel sounds present, no masses, no organomegaly  .		[] Abnormal:  		Normal normal genitalia, testes descended, circumcised/uncircumcised  .		Yady stage:			Breast yady:  .		Menstrual history:  .		[] Abnormal:  Extremities	Normal: FROM x4  .		[] Abnormal:  Skin		Normal: intact and not indurated, no rash, no acanthosis nigricans  .		[] Abnormal:  Neurologic	Normal: grossly intact  .		[] Abnormal:    LABS                        9.7    8.28  )-----------( 260      ( 30 Dec 2019 06:13 )             29.4                               142    |  115    |  3                   Calcium: 7.9   / iCa: x      (12-30 @ 06:13)    ----------------------------<  163       Magnesium: 2.0                              3.5     |  19     |  0.53             Phosphorous: 2.3      TPro  5.6    /  Alb  2.7    /  TBili  0.6    /  DBili  x      /  AST  20     /  ALT  19     /  AlkPhos  86     30 Dec 2019 06:13    CAPILLARY BLOOD GLUCOSE      POCT Blood Glucose.: 123 mg/dL (30 Dec 2019 11:22)  POCT Blood Glucose.: 156 mg/dL (30 Dec 2019 08:55)  POCT Blood Glucose.: 164 mg/dL (30 Dec 2019 06:48)  POCT Blood Glucose.: 168 mg/dL (30 Dec 2019 04:58)  POCT Blood Glucose.: 135 mg/dL (30 Dec 2019 03:08)  POCT Blood Glucose.: 154 mg/dL (30 Dec 2019 01:02)  POCT Blood Glucose.: 130 mg/dL (29 Dec 2019 23:19)  POCT Blood Glucose.: 142 mg/dL (29 Dec 2019 22:23)  POCT Blood Glucose.: 142 mg/dL (29 Dec 2019 21:08)  POCT Blood Glucose.: 183 mg/dL (29 Dec 2019 20:04)  POCT Blood Glucose.: 217 mg/dL (29 Dec 2019 18:52)  POCT Blood Glucose.: 181 mg/dL (29 Dec 2019 18:14)  POCT Blood Glucose.: 198 mg/dL (29 Dec 2019 16:11)  POCT Blood Glucose.: 160 mg/dL (29 Dec 2019 15:01)  POCT Blood Glucose.: 192 mg/dL (29 Dec 2019 14:07)  POCT Blood Glucose.: 222 mg/dL (29 Dec 2019 12:51)  POCT Blood Glucose.: 230 mg/dL (29 Dec 2019 12:05)        Assesment/plan    Diabetic ketoacidosis without coma associated with type 1 diabetes mellitus.     BS improved  cont lantus 30 units AM   cont Humalog 6 ac tid   fsg ac and hs  consider pump tx as out pt

## 2019-12-30 NOTE — PROGRESS NOTE ADULT - ASSESSMENT
34/F with DM type I (on tresiba 42 units in AM and novolog 15 units pre-meal), last DKA year ago presented to ED with abdominal pain, vomiting and diarrhea x 2 days. Patient reports having vomiting and abdominal pain starting friday after which she was taken to hospital and was discharge without any blood work. Since then her vomiting and diarrhea worsened and she was not able to tolerate PO so she came to ED today. She also reports epigastric and chest pain associated with cough and vomiting. Patient reports that her last HbA1c level was above 12 and sugars are always hard to control and her endocrinologist is in process of getting pump/ CGM.   On arrival to ed labs significant for WBC of 20.11, BUN/Cr of 17/1.39 (baseline of 6/0.49), AG of 26 and blood glucose level of 412. She was given zofran and started on insulin drip.      Patient will be admitted to ICU for DKA.    Plan:     Neuro:   baseline AAOx3   no active issues    Respi:   history on asthma   not on any medications   no acute issues at this time     CVS:   # chest pain   reports epigastric and substernal chest  pain - pain reproducible   likely form vomiting   EKG sinus tachy with T wave inversion in V3-V5  cardiac enzymes x1 negative - follow CE x2        GI:   # Gastroenteritis:   presented with vomiting and diarrhea   follow stool ova/parasite, GI PCR and stool culture   continue with hydration   NPO for now, start PO diet, once she is able to eat.     Heme:   Leukocytosis of 20.11  likely stress induced in setting of DKA   gastroenteritis vs flu  trend CBC     Endo:   # DKA:   presented with vomiting, diarrhea, abdominal pain and inability to tolerate PO  AG of 26, blood glucose of 412, pH of 7.12  s/p regular insulin 4.5 subq in ed and started on insulin drip   s/p 2 l normal saline bolus   Initially started on LR+KCL at 150 cc/hour   FS: trended down to 150s, IV fluids changed to half NS and D5 with K supplement,.  continue with insulin drip - with DKA protocol    Gap is closed now. Will start PO diet once she is able to tolerate.   BMP q4   Consult Endo Dr. Peralta consulted     ID:  Gastroenteritis vs flu   likely viral etiology   WBC of 20.11  will obtain UA, blood culture, flu panel, stool studies   will hold on antibiotics at this time  follow chest xray     Renal:   presented with BUN/Cr of 17/1.39 (baseline of 6/0.49)  pre-renal from dehydration   continue aggressive hydration   trend renal function     PPx:   heparin subq for DVT prophylaxis and protonix daily for GI  proph 34/F with DM type I (on tresiba 42 units in AM and novolog 15 units pre-meal), last DKA year ago presented to ED with abdominal pain, vomiting and diarrhea x 2 days. Patient reports having vomiting and abdominal pain starting friday after which she was taken to hospital and was discharge without any blood work. Since then her vomiting and diarrhea worsened and she was not able to tolerate PO so she came to ED today. She also reports epigastric and chest pain associated with cough and vomiting. Patient reports that her last HbA1c level was above 12 and sugars are always hard to control and her endocrinologist is in process of getting pump/ CGM.   On arrival to ed labs significant for WBC of 20.11, BUN/Cr of 17/1.39 (baseline of 6/0.49), AG of 26 and blood glucose level of 412. She was given zofran and started on insulin drip.      Patient will be admitted to ICU for DKA.    Plan:     Neuro:   baseline AAOx3   no active issues    Respi:   history on asthma   not on any medications   no acute issues at this time     CVS:   # chest pain : most likely due to GERd, Resoled now. c/w Protonix.       GI:   # Gastroenteritis: REsolved   started on PO diet     Heme:   Leukocytosis of 20.11  likely stress induced in setting of DKA, Resolved now     Endo:   # DKA:   presented with vomiting, diarrhea, abdominal pain and inability to tolerate PO  AG of 26, blood glucose of 412, pH of 7.12  ED course: s/p regular insulin 4.5 subq in ed and started on insulin drip, s/p 2 l normal saline bolus   Anion gap closed, patient able to tolerate PO   Bridged with 30 of Lantus.   Off insulin drip and IV fluids   c/w Lantus 30 and Humalog 6 pre meal with SS coverage     ID:  Gastroenteritis: likely viral etiology  resolved     Renal:   presented with BUN/Cr of 17/1.39 (baseline of 6/0.49)  pre-renal from dehydration   Resolved     PPx:   heparin subq for DVT prophylaxis and protonix daily for GI  proph

## 2019-12-30 NOTE — PROGRESS NOTE ADULT - SUBJECTIVE AND OBJECTIVE BOX
INTERVAL HPI/OVERNIGHT EVENTS: ***    PRESSORS: [ ] YES [ ] NO  WHICH:    ANTIBIOTICS:                  DATE STARTED:  ANTIBIOTICS:                  DATE STARTED:  ANTIBIOTICS:                  DATE STARTED:    Antimicrobial:    Cardiovascular:    Pulmonary:    Hematalogic:  heparin  Injectable 5000 Unit(s) SubCutaneous every 8 hours    Other:  acetaminophen    Suspension .. 650 milliGRAM(s) Oral every 6 hours PRN  aluminum hydroxide/magnesium hydroxide/simethicone Suspension 30 milliLiter(s) Oral every 6 hours PRN  chlorhexidine 4% Liquid 1 Application(s) Topical <User Schedule>  dextrose 5% + sodium chloride 0.45% with potassium chloride 20 mEq/L 1000 milliLiter(s) IV Continuous <Continuous>  insulin glargine Injectable (LANTUS) 30 Unit(s) SubCutaneous every morning  insulin lispro (HumaLOG) corrective regimen sliding scale   SubCutaneous Before meals and at bedtime  insulin lispro Injectable (HumaLOG) 6 Unit(s) SubCutaneous three times a day before meals  insulin regular Infusion 4.5 Unit(s)/Hr IV Continuous <Continuous>  ondansetron Injectable 4 milliGRAM(s) IV Push every 6 hours PRN  pantoprazole    Tablet 40 milliGRAM(s) Oral before breakfast  potassium phosphate IVPB 15 milliMole(s) IV Intermittent once    acetaminophen    Suspension .. 650 milliGRAM(s) Oral every 6 hours PRN  aluminum hydroxide/magnesium hydroxide/simethicone Suspension 30 milliLiter(s) Oral every 6 hours PRN  chlorhexidine 4% Liquid 1 Application(s) Topical <User Schedule>  dextrose 5% + sodium chloride 0.45% with potassium chloride 20 mEq/L 1000 milliLiter(s) IV Continuous <Continuous>  heparin  Injectable 5000 Unit(s) SubCutaneous every 8 hours  insulin glargine Injectable (LANTUS) 30 Unit(s) SubCutaneous every morning  insulin lispro (HumaLOG) corrective regimen sliding scale   SubCutaneous Before meals and at bedtime  insulin lispro Injectable (HumaLOG) 6 Unit(s) SubCutaneous three times a day before meals  insulin regular Infusion 4.5 Unit(s)/Hr IV Continuous <Continuous>  ondansetron Injectable 4 milliGRAM(s) IV Push every 6 hours PRN  pantoprazole    Tablet 40 milliGRAM(s) Oral before breakfast  potassium phosphate IVPB 15 milliMole(s) IV Intermittent once    Drug Dosing Weight  Height (cm): 142.2 (29 Dec 2019 05:00)  Weight (kg): 43.6 (29 Dec 2019 05:00)  BMI (kg/m2): 21.6 (29 Dec 2019 05:00)  BSA (m2): 1.3 (29 Dec 2019 05:00)    CENTRAL LINE: [ ] YES [ ] NO  LOCATION:   DATE INSERTED:  REMOVE: [ ] YES [ ] NO  EXPLAIN:    GEORGE: [ ] YES [ ] NO    DATE INSERTED:  REMOVE:  [ ] YES [ ] NO  EXPLAIN:    A-LINE:  [ ] YES [ ] NO  LOCATION:   DATE INSERTED:  REMOVE:  [ ] YES [ ] NO  EXPLAIN:    PMH -reviewed admission note, no change since admission  Heart faliure: acute [ ] chronic [ ] acute or chronic [ ] diastolic [ ] systolic [ ] combied systolic and diastolic[ ]  DESEAN: ATN[ ] renal medullary necrosis [ ] CKD I [ ]CKDII [ ]CKD III [ ]CKD IV [ ]CKD V [ ]Other pathological lesions [ ]  Abdominal Nutrition Status: malnutrition [ ] cachexia [ ] morbid obesity/BMI=40 [ ] Supplement ordered [___________]     ICU Vital Signs Last 24 Hrs  T(C): 36.3 (30 Dec 2019 04:00), Max: 37.3 (29 Dec 2019 15:00)  T(F): 97.4 (30 Dec 2019 04:00), Max: 99.2 (29 Dec 2019 18:00)  HR: 90 (30 Dec 2019 08:00) (86 - 115)  BP: 106/70 (30 Dec 2019 08:00) (85/56 - 136/81)  BP(mean): 77 (30 Dec 2019 08:00) (62 - 94)  ABP: --  ABP(mean): --  RR: 17 (30 Dec 2019 08:00) (13 - 24)  SpO2: 99% (30 Dec 2019 08:00) (99% - 100%)             @ 07:01  -   @ 07:00  --------------------------------------------------------  IN: 4708.8 mL / OUT: 1100 mL / NET: 3608.8 mL            PHYSICAL EXAM:    GENERAL: NAD, well-groomed, well-developed  HEAD:  Atraumatic, Normocephalic  EYES: EOMI, PERRLA, conjunctiva and sclera clear  ENMT: No tonsillar erythema, exudates, or enlargement; Moist mucous membranes, Good dentition, No lesions  NECK: Supple, normal appearance, No JVD; Normal thyroid; Trachea midline  NERVOUS SYSTEM:  Alert & Oriented X3, Good concentration; Motor Strength 5/5 B/L upper and lower extremities; DTRs 2+ intact and symmetric  CHEST/LUNG: No chest deformity; Normal percussion bilaterally; No rales, rhonchi, wheezing   HEART: Regular rate and rhythm; No murmurs, rubs, or gallops  ABDOMEN: Soft, Nontender, Nondistended; Bowel sounds present  EXTREMITIES:  2+ Peripheral Pulses, No clubbing, cyanosis, or edema  LYMPH: No lymphadenopathy noted  SKIN: No rashes or lesions; Good capillary refill      LABS:  CBC Full  -  ( 30 Dec 2019 06:13 )  WBC Count : 8.28 K/uL  RBC Count : 3.37 M/uL  Hemoglobin : 9.7 g/dL  Hematocrit : 29.4 %  Platelet Count - Automated : 260 K/uL  Mean Cell Volume : 87.2 fl  Mean Cell Hemoglobin : 28.8 pg  Mean Cell Hemoglobin Concentration : 33.0 gm/dL  Auto Neutrophil # : 4.98 K/uL  Auto Lymphocyte # : 2.59 K/uL  Auto Monocyte # : 0.65 K/uL  Auto Eosinophil # : 0.01 K/uL  Auto Basophil # : 0.02 K/uL  Auto Neutrophil % : 60.1 %  Auto Lymphocyte % : 31.3 %  Auto Monocyte % : 7.9 %  Auto Eosinophil % : 0.1 %  Auto Basophil % : 0.2 %        142  |  115<H>  |  3<L>  ----------------------------<  163<H>  3.5   |  19<L>  |  0.53    Ca    7.9<L>      30 Dec 2019 06:13  Phos  2.3       Mg     2.0         TPro  5.6<L>  /  Alb  2.7<L>  /  TBili  0.6  /  DBili  x   /  AST  20  /  ALT  19  /  AlkPhos  86        Urinalysis Basic - ( 29 Dec 2019 08:35 )    Color: Yellow / Appearance: Clear / S.020 / pH: x  Gluc: x / Ketone: Large  / Bili: Negative / Urobili: Negative   Blood: x / Protein: 100 / Nitrite: Negative   Leuk Esterase: Negative / RBC: 2-5 /HPF / WBC 0-2 /HPF   Sq Epi: x / Non Sq Epi: Moderate /HPF / Bacteria: Trace /HPF          RADIOLOGY & ADDITIONAL STUDIES REVIEWED:  ***    [ ]GOALS OF CARE DISCUSSION WITH PATIENT/FAMILY/PROXY:    CRITICAL CARE TIME SPENT: 35 minutes INTERVAL HPI/OVERNIGHT EVENTS:   Patient seen and examined at the bedside   No over night events   Able to tolerate Po   Bridge with Lantus 30 units   Dc Insulin drip and IV f in 2 hours of Lantus       PRESSORS: [ ] YES [ x] NO      Antimicrobial:    Cardiovascular:    Pulmonary:    Hematalogic:  heparin  Injectable 5000 Unit(s) SubCutaneous every 8 hours    Other:  acetaminophen    Suspension .. 650 milliGRAM(s) Oral every 6 hours PRN  aluminum hydroxide/magnesium hydroxide/simethicone Suspension 30 milliLiter(s) Oral every 6 hours PRN  chlorhexidine 4% Liquid 1 Application(s) Topical <User Schedule>  dextrose 5% + sodium chloride 0.45% with potassium chloride 20 mEq/L 1000 milliLiter(s) IV Continuous <Continuous>  insulin glargine Injectable (LANTUS) 30 Unit(s) SubCutaneous every morning  insulin lispro (HumaLOG) corrective regimen sliding scale   SubCutaneous Before meals and at bedtime  insulin lispro Injectable (HumaLOG) 6 Unit(s) SubCutaneous three times a day before meals  insulin regular Infusion 4.5 Unit(s)/Hr IV Continuous <Continuous>  ondansetron Injectable 4 milliGRAM(s) IV Push every 6 hours PRN  pantoprazole    Tablet 40 milliGRAM(s) Oral before breakfast  potassium phosphate IVPB 15 milliMole(s) IV Intermittent once    acetaminophen    Suspension .. 650 milliGRAM(s) Oral every 6 hours PRN  aluminum hydroxide/magnesium hydroxide/simethicone Suspension 30 milliLiter(s) Oral every 6 hours PRN  chlorhexidine 4% Liquid 1 Application(s) Topical <User Schedule>  dextrose 5% + sodium chloride 0.45% with potassium chloride 20 mEq/L 1000 milliLiter(s) IV Continuous <Continuous>  heparin  Injectable 5000 Unit(s) SubCutaneous every 8 hours  insulin glargine Injectable (LANTUS) 30 Unit(s) SubCutaneous every morning  insulin lispro (HumaLOG) corrective regimen sliding scale   SubCutaneous Before meals and at bedtime  insulin lispro Injectable (HumaLOG) 6 Unit(s) SubCutaneous three times a day before meals  insulin regular Infusion 4.5 Unit(s)/Hr IV Continuous <Continuous>  ondansetron Injectable 4 milliGRAM(s) IV Push every 6 hours PRN  pantoprazole    Tablet 40 milliGRAM(s) Oral before breakfast  potassium phosphate IVPB 15 milliMole(s) IV Intermittent once    Drug Dosing Weight  Height (cm): 142.2 (29 Dec 2019 05:00)  Weight (kg): 43.6 (29 Dec 2019 05:00)  BMI (kg/m2): 21.6 (29 Dec 2019 05:00)  BSA (m2): 1.3 (29 Dec 2019 05:00)    CENTRAL LINE: [ ] YES [x ] NO  LOCATION:   DATE INSERTED:  REMOVE: [ ] YES [ ] NO  EXPLAIN:    GEORGE: [ ] YES [ x] NO    DATE INSERTED:  REMOVE:  [ ] YES [ ] NO  EXPLAIN:    A-LINE:  [ ] YES [x ] NO  LOCATION:   DATE INSERTED:  REMOVE:  [ ] YES [ ] NO  EXPLAIN:      ICU Vital Signs Last 24 Hrs  T(C): 36.3 (30 Dec 2019 04:00), Max: 37.3 (29 Dec 2019 15:00)  T(F): 97.4 (30 Dec 2019 04:00), Max: 99.2 (29 Dec 2019 18:00)  HR: 90 (30 Dec 2019 08:00) (86 - 115)  BP: 106/70 (30 Dec 2019 08:00) (85/56 - 136/81)  BP(mean): 77 (30 Dec 2019 08:00) (62 - 94)  RR: 17 (30 Dec 2019 08:00) (13 - 24)  SpO2: 99% (30 Dec 2019 08:00) (99% - 100%)       @ 07:01  -   @ 07:00  --------------------------------------------------------  IN: 4708.8 mL / OUT: 1100 mL / NET: 3608.8 mL      PHYSICAL EXAM:    GENERAL: NAD, well-groomed, well-developed  HEAD:  Atraumatic, Normocephalic  EYES: EOMI, PERRLA, conjunctiva and sclera clear  ENMT: No tonsillar erythema, exudates, or enlargement; Moist mucous membranes, Good dentition, No lesions  NECK: Supple, normal appearance, No JVD; Normal thyroid; Trachea midline  NERVOUS SYSTEM:  Alert & Oriented X3, Good concentration; Motor Strength 5/5 B/L upper and lower extremities; DTRs 2+ intact and symmetric  CHEST/LUNG: No chest deformity; Normal percussion bilaterally; No rales, rhonchi, wheezing   HEART: Regular rate and rhythm; No murmurs, rubs, or gallops  ABDOMEN: Soft, Nontender, Nondistended; Bowel sounds present  EXTREMITIES:  2+ Peripheral Pulses, No clubbing, cyanosis, or edema  LYMPH: No lymphadenopathy noted  SKIN: No rashes or lesions; Good capillary refill      LABS:  CBC Full  -  ( 30 Dec 2019 06:13 )  WBC Count : 8.28 K/uL  RBC Count : 3.37 M/uL  Hemoglobin : 9.7 g/dL  Hematocrit : 29.4 %  Platelet Count - Automated : 260 K/uL  Mean Cell Volume : 87.2 fl  Mean Cell Hemoglobin : 28.8 pg  Mean Cell Hemoglobin Concentration : 33.0 gm/dL  Auto Neutrophil # : 4.98 K/uL  Auto Lymphocyte # : 2.59 K/uL  Auto Monocyte # : 0.65 K/uL  Auto Eosinophil # : 0.01 K/uL  Auto Basophil # : 0.02 K/uL  Auto Neutrophil % : 60.1 %  Auto Lymphocyte % : 31.3 %  Auto Monocyte % : 7.9 %  Auto Eosinophil % : 0.1 %  Auto Basophil % : 0.2 %    12    142  |  115<H>  |  3<L>  ----------------------------<  163<H>  3.5   |  19<L>  |  0.53    Ca    7.9<L>      30 Dec 2019 06:13  Phos  2.3     12  Mg     2.0         TPro  5.6<L>  /  Alb  2.7<L>  /  TBili  0.6  /  DBili  x   /  AST  20  /  ALT  19  /  AlkPhos  86  12-30      Urinalysis Basic - ( 29 Dec 2019 08:35 )    Color: Yellow / Appearance: Clear / S.020 / pH: x  Gluc: x / Ketone: Large  / Bili: Negative / Urobili: Negative   Blood: x / Protein: 100 / Nitrite: Negative   Leuk Esterase: Negative / RBC: 2-5 /HPF / WBC 0-2 /HPF   Sq Epi: x / Non Sq Epi: Moderate /HPF / Bacteria: Trace /HPF          RADIOLOGY & ADDITIONAL STUDIES REVIEWED:  yes     [ ]GOALS OF CARE DISCUSSION WITH PATIENT/FAMILY/PROXY: full code     CRITICAL CARE TIME SPENT: 35 minutes

## 2019-12-31 ENCOUNTER — TRANSCRIPTION ENCOUNTER (OUTPATIENT)
Age: 34
End: 2019-12-31

## 2019-12-31 VITALS
TEMPERATURE: 98 F | RESPIRATION RATE: 17 BRPM | SYSTOLIC BLOOD PRESSURE: 109 MMHG | OXYGEN SATURATION: 100 % | DIASTOLIC BLOOD PRESSURE: 76 MMHG | HEART RATE: 87 BPM | WEIGHT: 97.66 LBS

## 2019-12-31 DIAGNOSIS — E10.65 TYPE 1 DIABETES MELLITUS WITH HYPERGLYCEMIA: ICD-10-CM

## 2019-12-31 DIAGNOSIS — J45.909 UNSPECIFIED ASTHMA, UNCOMPLICATED: ICD-10-CM

## 2019-12-31 DIAGNOSIS — Z71.89 OTHER SPECIFIED COUNSELING: ICD-10-CM

## 2019-12-31 DIAGNOSIS — B00.9 HERPESVIRAL INFECTION, UNSPECIFIED: ICD-10-CM

## 2019-12-31 LAB
ALBUMIN SERPL ELPH-MCNC: 2.6 G/DL — LOW (ref 3.5–5)
ALP SERPL-CCNC: 83 U/L — SIGNIFICANT CHANGE UP (ref 40–120)
ALT FLD-CCNC: 19 U/L DA — SIGNIFICANT CHANGE UP (ref 10–60)
ANION GAP SERPL CALC-SCNC: 7 MMOL/L — SIGNIFICANT CHANGE UP (ref 5–17)
AST SERPL-CCNC: 21 U/L — SIGNIFICANT CHANGE UP (ref 10–40)
BASOPHILS # BLD AUTO: 0.02 K/UL — SIGNIFICANT CHANGE UP (ref 0–0.2)
BASOPHILS NFR BLD AUTO: 0.4 % — SIGNIFICANT CHANGE UP (ref 0–2)
BILIRUB SERPL-MCNC: 0.5 MG/DL — SIGNIFICANT CHANGE UP (ref 0.2–1.2)
BUN SERPL-MCNC: 3 MG/DL — LOW (ref 7–18)
CALCIUM SERPL-MCNC: 7.9 MG/DL — LOW (ref 8.4–10.5)
CHLORIDE SERPL-SCNC: 111 MMOL/L — HIGH (ref 96–108)
CO2 SERPL-SCNC: 27 MMOL/L — SIGNIFICANT CHANGE UP (ref 22–31)
CREAT SERPL-MCNC: 0.47 MG/DL — LOW (ref 0.5–1.3)
EOSINOPHIL # BLD AUTO: 0.05 K/UL — SIGNIFICANT CHANGE UP (ref 0–0.5)
EOSINOPHIL NFR BLD AUTO: 0.9 % — SIGNIFICANT CHANGE UP (ref 0–6)
GLUCOSE BLDC GLUCOMTR-MCNC: 163 MG/DL — HIGH (ref 70–99)
GLUCOSE BLDC GLUCOMTR-MCNC: 191 MG/DL — HIGH (ref 70–99)
GLUCOSE SERPL-MCNC: 102 MG/DL — HIGH (ref 70–99)
HCT VFR BLD CALC: 30 % — LOW (ref 34.5–45)
HGB BLD-MCNC: 10.2 G/DL — LOW (ref 11.5–15.5)
IMM GRANULOCYTES NFR BLD AUTO: 0.2 % — SIGNIFICANT CHANGE UP (ref 0–1.5)
LYMPHOCYTES # BLD AUTO: 2.94 K/UL — SIGNIFICANT CHANGE UP (ref 1–3.3)
LYMPHOCYTES # BLD AUTO: 51.7 % — HIGH (ref 13–44)
MAGNESIUM SERPL-MCNC: 2.1 MG/DL — SIGNIFICANT CHANGE UP (ref 1.6–2.6)
MCHC RBC-ENTMCNC: 29 PG — SIGNIFICANT CHANGE UP (ref 27–34)
MCHC RBC-ENTMCNC: 34 GM/DL — SIGNIFICANT CHANGE UP (ref 32–36)
MCV RBC AUTO: 85.2 FL — SIGNIFICANT CHANGE UP (ref 80–100)
MONOCYTES # BLD AUTO: 0.48 K/UL — SIGNIFICANT CHANGE UP (ref 0–0.9)
MONOCYTES NFR BLD AUTO: 8.4 % — SIGNIFICANT CHANGE UP (ref 2–14)
NEUTROPHILS # BLD AUTO: 2.19 K/UL — SIGNIFICANT CHANGE UP (ref 1.8–7.4)
NEUTROPHILS NFR BLD AUTO: 38.4 % — LOW (ref 43–77)
NRBC # BLD: 0 /100 WBCS — SIGNIFICANT CHANGE UP (ref 0–0)
PHOSPHATE SERPL-MCNC: 3.4 MG/DL — SIGNIFICANT CHANGE UP (ref 2.5–4.5)
PLATELET # BLD AUTO: 240 K/UL — SIGNIFICANT CHANGE UP (ref 150–400)
POTASSIUM SERPL-MCNC: 3.2 MMOL/L — LOW (ref 3.5–5.3)
POTASSIUM SERPL-SCNC: 3.2 MMOL/L — LOW (ref 3.5–5.3)
PROT SERPL-MCNC: 5.5 G/DL — LOW (ref 6–8.3)
RBC # BLD: 3.52 M/UL — LOW (ref 3.8–5.2)
RBC # FLD: 14.1 % — SIGNIFICANT CHANGE UP (ref 10.3–14.5)
SODIUM SERPL-SCNC: 145 MMOL/L — SIGNIFICANT CHANGE UP (ref 135–145)
WBC # BLD: 5.69 K/UL — SIGNIFICANT CHANGE UP (ref 3.8–10.5)
WBC # FLD AUTO: 5.69 K/UL — SIGNIFICANT CHANGE UP (ref 3.8–10.5)

## 2019-12-31 PROCEDURE — 87631 RESP VIRUS 3-5 TARGETS: CPT

## 2019-12-31 PROCEDURE — 80053 COMPREHEN METABOLIC PANEL: CPT

## 2019-12-31 PROCEDURE — 85027 COMPLETE CBC AUTOMATED: CPT

## 2019-12-31 PROCEDURE — 36415 COLL VENOUS BLD VENIPUNCTURE: CPT

## 2019-12-31 PROCEDURE — 80048 BASIC METABOLIC PNL TOTAL CA: CPT

## 2019-12-31 PROCEDURE — 83036 HEMOGLOBIN GLYCOSYLATED A1C: CPT

## 2019-12-31 PROCEDURE — 82803 BLOOD GASES ANY COMBINATION: CPT

## 2019-12-31 PROCEDURE — 82962 GLUCOSE BLOOD TEST: CPT

## 2019-12-31 PROCEDURE — 71045 X-RAY EXAM CHEST 1 VIEW: CPT

## 2019-12-31 PROCEDURE — 82550 ASSAY OF CK (CPK): CPT

## 2019-12-31 PROCEDURE — 83735 ASSAY OF MAGNESIUM: CPT

## 2019-12-31 PROCEDURE — 84484 ASSAY OF TROPONIN QUANT: CPT

## 2019-12-31 PROCEDURE — 99239 HOSP IP/OBS DSCHRG MGMT >30: CPT | Mod: GC

## 2019-12-31 PROCEDURE — 81001 URINALYSIS AUTO W/SCOPE: CPT

## 2019-12-31 PROCEDURE — 96374 THER/PROPH/DIAG INJ IV PUSH: CPT

## 2019-12-31 PROCEDURE — 93005 ELECTROCARDIOGRAM TRACING: CPT

## 2019-12-31 PROCEDURE — 99285 EMERGENCY DEPT VISIT HI MDM: CPT | Mod: 25

## 2019-12-31 PROCEDURE — 97161 PT EVAL LOW COMPLEX 20 MIN: CPT

## 2019-12-31 PROCEDURE — 87040 BLOOD CULTURE FOR BACTERIA: CPT

## 2019-12-31 PROCEDURE — 84100 ASSAY OF PHOSPHORUS: CPT

## 2019-12-31 PROCEDURE — 82010 KETONE BODYS QUAN: CPT

## 2019-12-31 PROCEDURE — 82553 CREATINE MB FRACTION: CPT

## 2019-12-31 PROCEDURE — 83690 ASSAY OF LIPASE: CPT

## 2019-12-31 PROCEDURE — 84702 CHORIONIC GONADOTROPIN TEST: CPT

## 2019-12-31 RX ORDER — INSULIN LISPRO 100/ML
6 VIAL (ML) SUBCUTANEOUS
Qty: 100 | Refills: 0
Start: 2019-12-31 | End: 2020-01-29

## 2019-12-31 RX ORDER — ISOPROPYL ALCOHOL, BENZOCAINE .7; .06 ML/ML; ML/ML
1 SWAB TOPICAL
Qty: 100 | Refills: 1
Start: 2019-12-31 | End: 2020-02-18

## 2019-12-31 RX ORDER — POTASSIUM CHLORIDE 20 MEQ
40 PACKET (EA) ORAL ONCE
Refills: 0 | Status: COMPLETED | OUTPATIENT
Start: 2019-12-31 | End: 2019-12-31

## 2019-12-31 RX ORDER — INSULIN GLARGINE 100 [IU]/ML
30 INJECTION, SOLUTION SUBCUTANEOUS
Qty: 3000 | Refills: 0
Start: 2019-12-31 | End: 2020-01-29

## 2019-12-31 RX ADMIN — Medication 40 MILLIEQUIVALENT(S): at 08:56

## 2019-12-31 RX ADMIN — Medication 1: at 08:56

## 2019-12-31 RX ADMIN — INSULIN GLARGINE 30 UNIT(S): 100 INJECTION, SOLUTION SUBCUTANEOUS at 08:57

## 2019-12-31 RX ADMIN — PANTOPRAZOLE SODIUM 40 MILLIGRAM(S): 20 TABLET, DELAYED RELEASE ORAL at 06:04

## 2019-12-31 RX ADMIN — Medication 1: at 11:58

## 2019-12-31 RX ADMIN — CHLORHEXIDINE GLUCONATE 1 APPLICATION(S): 213 SOLUTION TOPICAL at 06:05

## 2019-12-31 NOTE — DISCHARGE NOTE NURSING/CASE MANAGEMENT/SOCIAL WORK - PATIENT PORTAL LINK FT
You can access the FollowMyHealth Patient Portal offered by [facility] by registering at the following website: [email or web address]. By joining [facility]’s FollowMyHealth portal, you will also be able to view your health information using other applications (apps) compatible with our system.

## 2019-12-31 NOTE — DISCHARGE NOTE PROVIDER - NSDCCPCAREPLAN_GEN_ALL_CORE_FT
PRINCIPAL DISCHARGE DIAGNOSIS  Diagnosis: Diabetic ketoacidosis without coma associated with type 1 diabetes mellitus  Assessment and Plan of Treatment: You were admitted for Diabetic Ketoacidosis and managed with insulin drip in ICU. Your sugars have normalized and you are now tolerating diet. Continue with your blood sugar medication. HbAIC was found to be 11.1 on admission.  You must maintain a healthy diet that consist of low sugar, low fat, low sodium diet. Exercise frequently if possible. Consider repeating your Hemoglobin A1c within 3 months after discharge to monitor your average blood glucose control. Follow up with primary care physician in one week after discharge. You have requested to continue following with Dr. Peralta for Endocrinology on discharge. Her contact information and address is provided in this packet. PRINCIPAL DISCHARGE DIAGNOSIS  Diagnosis: Diabetic ketoacidosis without coma associated with type 1 diabetes mellitus  Assessment and Plan of Treatment: You were admitted for Diabetic Ketoacidosis and managed with insulin drip in ICU. Your sugars have normalized and you are now tolerating diet. Continue with your blood sugar medication. HbAIC was found to be 11.1 on admission.  You must maintain a healthy diet that consist of low sugar, low fat, low sodium diet. Exercise frequently if possible. Consider repeating your Hemoglobin A1c within 3 months after discharge to monitor your average blood glucose control. Follow up with primary care physician in one week after discharge. You have requested to continue following with Dr. Peralta for Endocrinology on discharge. Her contact information and address is provided in this packet.      SECONDARY DISCHARGE DIAGNOSES  Diagnosis: Diabetes type 1, uncontrolled  Assessment and Plan of Treatment: As above. Your insulin regimen has been adjusted. Please continue to follow with Dr. Peralta, endocrinology, as outpatient.

## 2019-12-31 NOTE — DISCHARGE NOTE PROVIDER - CARE PROVIDER_API CALL
Rabia Peralta)  EndocrinologyMetabDiabetes  [address] [place] [address]  Phone: [phone]  Fax: [phone]  Follow Up Time: 1 week    Rosita Marie)  Internal Medicine  [address] [place] [address]  Phone: [phone]  Fax: [phone]  Follow Up Time: 2 weeks

## 2019-12-31 NOTE — DISCHARGE NOTE PROVIDER - NSDCMRMEDTOKEN_GEN_ALL_CORE_FT
gabapentin 100 mg oral capsule: 1 cap(s) orally 3 times a day  HumaLOG Cartridge 100 units/mL injectable solution: 15 unit(s) injectable 3 times a day  Tresiba FlexTouch 200 units/mL subcutaneous solution: alcohol swabs : Apply topically to affected area 4 times a day   gabapentin 100 mg oral capsule: 1 cap(s) orally 3 times a day  glucometer (per patient&#x27;s insurance): Test blood sugars four times a day. Dispense #1 glucometer.  HumaLOG Cartridge 100 units/mL injectable solution: 6 unit(s) injectable 3 times a day before meals  insulin glargine 100 units/mL subcutaneous solution: 30 unit(s) subcutaneous once a day (at bedtime)   Insulin Pen Needles, 4mm: 1 application subcutaneously 4 times a day. ** Use with insulin pen **   lancets: 1 application subcutaneously 4 times a day

## 2019-12-31 NOTE — PROGRESS NOTE ADULT - SUBJECTIVE AND OBJECTIVE BOX
Interval Events:      denies complaints  tolerating po intake  POC controlled- without bolus doses and small amount on sliding scale    Allergies    amoxicillin (Rash)    Intolerances      Endocrine/Metabolic Medications:  insulin glargine Injectable (LANTUS) 30 Unit(s) SubCutaneous every morning  insulin lispro (HumaLOG) corrective regimen sliding scale   SubCutaneous Before meals and at bedtime      Vital Signs Last 24 Hrs  T(C): 36.8 (31 Dec 2019 05:38), Max: 37.1 (30 Dec 2019 20:00)  T(F): 98.2 (31 Dec 2019 05:38), Max: 98.7 (30 Dec 2019 20:00)  HR: 87 (31 Dec 2019 05:38) (87 - 103)  BP: 109/76 (31 Dec 2019 05:38) (109/76 - 143/86)  BP(mean): 94 (30 Dec 2019 21:00) (84 - 104)  RR: 17 (31 Dec 2019 05:38) (14 - 25)  SpO2: 100% (31 Dec 2019 05:38) (100% - 100%)      PHYSICAL EXAM  All physical exam findings normal, except those marked:  General:	Alert, active, cooperative, NAD, well hydrated    Neck		Normal: supple, no cervical adenopathy  Cardiovascular	Normal: regular rate  Respiratory	Normal: no chest wall deformity, normal respiratory pattern  Abdominal	Normal: soft    Extremities	Normal: FROM x4    Skin		Normal: intact and not indurated  Neurologic	Normal: grossly intact      LABS                        10.2   5.69  )-----------( 240      ( 31 Dec 2019 03:38 )             30.0                               145    |  111    |  3                   Calcium: 7.9   / iCa: x      (12-31 @ 03:38)    ----------------------------<  102       Magnesium: 2.1                              3.2     |  27     |  0.47             Phosphorous: 3.4      TPro  5.5    /  Alb  2.6    /  TBili  0.5    /  DBili  x      /  AST  21     /  ALT  19     /  AlkPhos  83     31 Dec 2019 03:38    CAPILLARY BLOOD GLUCOSE      POCT Blood Glucose.: 163 mg/dL (31 Dec 2019 08:06)  POCT Blood Glucose.: 128 mg/dL (30 Dec 2019 21:15)  POCT Blood Glucose.: 86 mg/dL (30 Dec 2019 16:12)        Assesment/plan

## 2019-12-31 NOTE — DISCHARGE NOTE PROVIDER - PROVIDER TOKENS
PROVIDER:[TOKEN:[[id number]],FOLLOWUP:[1 week]],PROVIDER:[TOKEN:[[id number]],FOLLOWUP:[2 weeks]]

## 2019-12-31 NOTE — DISCHARGE NOTE PROVIDER - HOSPITAL COURSE
34/F with DM type I (on tresiba 42 units in AM and novolog 15 units pre-meal), last DKA year ago presented to ED with abdominal pain, vomiting and diarrhea x 2 days. Patient reports having vomiting and abdominal pain starting friday after which she was taken to hospital and was discharge without any blood work. Since then her vomiting and diarrhea worsened and she was not able to tolerate PO so she came to ED today. She also reports epigastric and chest pain associated with cough and vomiting. Patient reports that her last HbA1c level was above 12 and sugars are always hard to control and her endocrinologist is in process of getting pump/ CGM.     On arrival to ed labs significant for WBC of 20.11, BUN/Cr of 17/1.39 (baseline of 6/0.49), AG of 26 and blood glucose level of 412. She was given zofran and started on insulin drip.          Patient was admitted to ICU for DKA 2/2 gastroenteritis         1. DKA:     She presented with vomiting, diarrhea, abdominal pain and inability to tolerate PO. AG of 26, blood glucose of 412, pH of 7.12. s/p regular insulin 4.5 subq in ed and started on insulin drip, s/p 2 l normal saline bolus. anion gap closed on 12/29 but insulin drip was continued on 12/29 as she was unable to tolerate PO diet. on 12/30, patient nausea improved, bridges in AM with Lantus 30 units.     started on Lantus 30 units in AM, 6 units humalog pre meal with ss coverage.     At home,  she takes Tresiba 42 units, Novolog 15 units pre meal. She will continue to follow with Dr. Peralta as outpatient.         2. Gastroenteritis:     Presented with vomiting and diarrhea which resolved with conservative management,         3. history on asthma     not on any medications     no acute issues at this time             Patient is medically stable for discharge home with outpatient endocrinology follow up. 34/F with DM type I (on tresiba 42 units in AM and novolog 15 units pre-meal), last DKA year ago presented to ED with abdominal pain, vomiting and diarrhea x 2 days. Patient reports having vomiting and abdominal pain starting friday after which she was taken to hospital and was discharge without any blood work. Since then her vomiting and diarrhea worsened and she was not able to tolerate PO so she came to ED today. She also reports epigastric and chest pain associated with cough and vomiting. Patient reports that her last HbA1c level was above 12 and sugars are always hard to control and her endocrinologist is in process of getting pump/ CGM.     On arrival to ed labs significant for WBC of 20.11, BUN/Cr of 17/1.39 (baseline of 6/0.49), AG of 26 and blood glucose level of 412. She was given zofran and started on insulin drip.          Patient was admitted to ICU for DKA 2/2 gastroenteritis         DKA: s/p regular insulin 4.5 subq in ed and started on insulin drip, s/p 2 l normal saline bolus. anion gap closed on 12/29 but insulin drip was continued on 12/29 as she was unable to tolerate PO diet. on 12/30, patient nausea improved, bridged in AM with Lantus 30 units. Patient's blood sugars better controlled with significant clinical improvement, to be discharged on lantus 30 units in AM, 6 units humalog pre meal with ss coverage.         Patient is medically stable for discharge home with outpatient endocrinology follow up with Dr Peralta.

## 2019-12-31 NOTE — PROGRESS NOTE ADULT - ASSESSMENT
34/F with DM type I (on tresiba 42 units in AM and novolog 15 units pre-meal), last DKA year ago presented to ED with abdominal pain, vomiting and diarrhea x 2 days.

## 2019-12-31 NOTE — PROGRESS NOTE ADULT - ASSESSMENT
34/F with DM type I (on tresiba 42 units in AM and novolog 15 units pre-meal), last DKA year ago presented to ED with abdominal pain, vomiting and diarrhea x 2 days.  found to be in DKA. Last insulin dose 2 days ago. Denies hypoglycemia. Last A1c about 11%.     Diabetic ketoacidosis without coma associated with type 1 diabetes mellitus.  uncontrolled  complicated by DKA  HBA1C: 11%    recommendations:  - can continue lantus 30 units daily  - lower prandial dose to 3 units pre meals  - continue low dose sliding scale  - goal inpatient glucose range: 140-180mg/dl      vomiting, diarrhea  complicated by missed insulin, DKA  likely viral  per primary team  resolved    asthma  not in acute exacerbation  per primary team      Please call - Dr Coco Vieyra- (Covering for Dr Peralta) with questions  Please arrange outpatient follow up with Dr. Peralta.    Discussed with patient and primary team.

## 2019-12-31 NOTE — PROGRESS NOTE ADULT - SUBJECTIVE AND OBJECTIVE BOX
Patient is a 34y old  Female who presents with a chief complaint of DKA (31 Dec 2019 11:47)      INTERVAL HPI/OVERNIGHT EVENTS: seen and examined. She is s/p ICU for DKA.   No complains today. BG controlled.       MEDICATIONS  (STANDING):  chlorhexidine 4% Liquid 1 Application(s) Topical <User Schedule>  heparin  Injectable 5000 Unit(s) SubCutaneous every 8 hours  insulin glargine Injectable (LANTUS) 30 Unit(s) SubCutaneous every morning  insulin lispro (HumaLOG) corrective regimen sliding scale   SubCutaneous Before meals and at bedtime  pantoprazole    Tablet 40 milliGRAM(s) Oral before breakfast    MEDICATIONS  (PRN):  acetaminophen    Suspension .. 650 milliGRAM(s) Oral every 6 hours PRN Temp greater or equal to 38C (100.4F), Mild Pain (1 - 3)  aluminum hydroxide/magnesium hydroxide/simethicone Suspension 30 milliLiter(s) Oral every 6 hours PRN Dyspepsia  ondansetron Injectable 4 milliGRAM(s) IV Push every 6 hours PRN Nausea and/or Vomiting      Allergies    amoxicillin (Rash)    Intolerances        REVIEW OF SYSTEMS:  CONSTITUTIONAL: No fever, weight loss, or fatigue  RESPIRATORY: No cough, wheezing, chills or hemoptysis; No shortness of breath  CARDIOVASCULAR: No chest pain, palpitations, dizziness, or leg swelling  GASTROINTESTINAL: No abdominal or epigastric pain. No nausea, vomiting, or hematemesis; No diarrhea or constipation. No melena or hematochezia.  NEUROLOGICAL: No headaches, memory loss, loss of strength, numbness, or tremors  MUSCULOSKELETAL: No joint pain or swelling; No muscle, back, or extremity pain      Vital Signs Last 24 Hrs  T(C): 36.8 (31 Dec 2019 05:38), Max: 37.1 (30 Dec 2019 20:00)  T(F): 98.2 (31 Dec 2019 05:38), Max: 98.7 (30 Dec 2019 20:00)  HR: 87 (31 Dec 2019 05:38) (87 - 103)  BP: 109/76 (31 Dec 2019 05:38) (109/76 - 143/86)  BP(mean): 94 (30 Dec 2019 21:00) (87 - 104)  RR: 17 (31 Dec 2019 05:38) (15 - 25)  SpO2: 100% (31 Dec 2019 05:38) (100% - 100%)    PHYSICAL EXAM:  GENERAL: NAD, well-groomed, well-developed  HEAD:  Atraumatic, Normocephalic  EYES: EOMI, PERRLA, conjunctiva and sclera clear  NECK: Supple, No JVD, Normal thyroid  NERVOUS SYSTEM:  Alert & Oriented X3, No gross focal deficits  CHEST/LUNG: Clear to percussion bilaterally; No rales, rhonchi, wheezing, or rubs  HEART: Regular rate and rhythm; No murmurs, rubs, or gallops  ABDOMEN: Soft, Nontender, Nondistended; Bowel sounds present  EXTREMITIES:  No clubbing, cyanosis, or edema  SKIN: No rashes or lesions    LABS:                        10.2   5.69  )-----------( 240      ( 31 Dec 2019 03:38 )             30.0     12-31    145  |  111<H>  |  3<L>  ----------------------------<  102<H>  3.2<L>   |  27  |  0.47<L>    Ca    7.9<L>      31 Dec 2019 03:38  Phos  3.4     12-31  Mg     2.1     12-31    TPro  5.5<L>  /  Alb  2.6<L>  /  TBili  0.5  /  DBili  x   /  AST  21  /  ALT  19  /  AlkPhos  83  12-31        CAPILLARY BLOOD GLUCOSE      POCT Blood Glucose.: 191 mg/dL (31 Dec 2019 11:48)  POCT Blood Glucose.: 163 mg/dL (31 Dec 2019 08:06)  POCT Blood Glucose.: 128 mg/dL (30 Dec 2019 21:15)  POCT Blood Glucose.: 86 mg/dL (30 Dec 2019 16:12)      RADIOLOGY & ADDITIONAL TESTS:    Imaging Personally Reviewed:  [ ] YES  [ ] NO    Consultant(s) Notes Reviewed:  [ ] YES  [ ] NO    Care Discussed with Consultants/Other Providers [ ] YES  [ ] NO    Plan of Care discussed with Housestaff [ ]YES [ ] NO

## 2019-12-31 NOTE — PROGRESS NOTE ADULT - PROBLEM SELECTOR PLAN 2
BP controlled   c/w Lantus 30 and Humalog per SS   She will follow up with Dr. Peralta endocrinologist

## 2019-12-31 NOTE — PROGRESS NOTE ADULT - ATTENDING COMMENTS
DKA , Type 1 DM secondary to gastroenteritis    DKA Resolved  Transition to SQ insulin now that she is tolerating PO  PT/OT  OOB to chair  DVT prophylaxis
DKA , Type 1 DM secondary to gastroenteritis    DKA resolving  Transition to SQ insulin once able to tolerate PO  Replace electrolytes including potassium  Monitor for signs of infection  Advance diet  DVT prophylaxis  Endocrinology consult
Stable for discharge

## 2020-01-01 ENCOUNTER — OUTPATIENT (OUTPATIENT)
Dept: OUTPATIENT SERVICES | Facility: HOSPITAL | Age: 35
LOS: 1 days | End: 2020-01-01
Payer: MEDICAID

## 2020-01-01 DIAGNOSIS — Z98.51 TUBAL LIGATION STATUS: Chronic | ICD-10-CM

## 2020-01-01 DIAGNOSIS — Z98.49 CATARACT EXTRACTION STATUS, UNSPECIFIED EYE: Chronic | ICD-10-CM

## 2020-01-01 DIAGNOSIS — Z98.89 OTHER SPECIFIED POSTPROCEDURAL STATES: Chronic | ICD-10-CM

## 2020-01-01 DIAGNOSIS — Z90.49 ACQUIRED ABSENCE OF OTHER SPECIFIED PARTS OF DIGESTIVE TRACT: Chronic | ICD-10-CM

## 2020-01-07 DIAGNOSIS — Z76.89 PERSONS ENCOUNTERING HEALTH SERVICES IN OTHER SPECIFIED CIRCUMSTANCES: ICD-10-CM

## 2020-03-01 PROCEDURE — G9005: CPT

## 2020-08-31 NOTE — PHYSICAL THERAPY INITIAL EVALUATION ADULT - LONG TERM MEMORY, REHAB EVAL
COPD/PN Week 4 Survey      Respons[facility]   [facility]   COVID-19 Test Status  Confirmed   Does the patient have one of the following disease processes/diagnoses(primary or secondary)?  COPD/Pneumonia   Was the primary reason for admission:  Pneumonia   Week 4 attempt successful?  Yes   Call start time  1221   Call end time  1225   Is patient permission given to speak with other caregiver?  Yes   List who call center can speak with  son or wife   Person spoke with today (if not patient) and relationship  wife-Etta   Meds reviewed with patient/caregiver?  Yes   Is the patient having any side effects they believe may be caused by any medication additions or changes?  No   Is the patient taking all medications as directed (includes completed medication regime)?  Yes   Has the patient kept scheduled appointments due by today?  Yes   Is the patient still receiving Home Health Services?  N/A   Pulse Ox monitoring  None   Psychosocial issues?  No   What is the patient's perception of their health status since discharge?  Improving   Nursing Interventions  Nurse provided patient education   Is the patient/caregiver able to teach back the hierarchy of who to call/visit for symptoms/problems? PCP, Specialist, Home health nurse, Urgent Care, ED, 911  Yes   Is the patient/caregiver able to teach back signs and symptoms of worsening condition:  Fever/chills, Shortness of breath, Chest pain   Week 4 call completed?  Yes   Would the patient like one additional call?  No   Graduated  Yes   Wrap up additional comments  Wife states has improved, and is at work today. States still some SOA on exertion. Denies any edema. States will consider purchasing home BP monitor and keep record for appts. Denies any needs today. States will call if any questions/concerns.          Baylee Giraldo RN  
intact

## 2020-10-26 NOTE — DIETITIAN INITIAL EVALUATION ADULT. - NUTRITIONGOAL OUTCOME1
1. Trigger point with back pain  2. Trigger point of shoulder region, unspecified laterality    Physical therapy referral sent  - they will call with date/time of appointment.    -- Start [place] / Myofascial therapy.        START:   - methocarbamol (ROBAXIN) 500 MG tablet; Take 1-2 tablets (500-1,000 mg) by mouth 4 times daily as needed for muscle spasms --- Use 1st, if covered.. otherwise use Flexeril ---  Dispense: 80 tablet; Refill: 1  - cyclobenzaprine (FLEXERIL) 10 MG tablet; Take 1 tablet (10 mg) by mouth 2 times daily as needed for muscle spasms  Dispense: 60 tablet; Refill: 3    Return as needed for follow-up with Dr. Curiel.    Clinic : [phone]  Appointment line: [phone]   
To meet nutrition needs

## 2021-03-29 ENCOUNTER — RESULT REVIEW (OUTPATIENT)
Age: 36
End: 2021-03-29

## 2021-08-31 NOTE — PHYSICAL THERAPY INITIAL EVALUATION ADULT - LEVEL OF INDEPENDENCE, REHAB EVAL
independent Azithromycin Counseling:  I discussed with the patient the risks of azithromycin including but not limited to GI upset, allergic reaction, drug rash, diarrhea, and yeast infections.

## 2022-01-29 NOTE — DISCHARGE NOTE ADULT - HOSPITAL COURSE
33 yo female from home lives with 2 children. PMH Type 1 DM (for the past 14 years), asthma, HLD, IBS, gastritis, depression, s/p , tubal ligation, cholecystectomy, acne (on abx) and bilateral cataracts. Pt was  BIBA for 3 days of vomiting, diarrhea, abdominal pain.  Patient is somnolent but able to provide some history; additional medical history obtained from her PMD, Dr Salvatore Louis, [phone].  Endorses 3 days of chills, NBNB vomiting, nonbloody diarrhea and generalized abdominal pain x 3 days.  Patient usually takes long acting insulin Tresiba 90U daily and sliding scale Humalog but has not taken Tresiba x past 2 days, states took 25U short acting insulin one day prior to admission.  Patient saw PMD day prior to admission, who told her to come to ED.  Currently, also endorses chest pain and SOB. Patient was admitted due to DKA found on admission with anion gap of 23    Pt was started on insulin drip at 4 cc/hr and transferred to ICU. Patient's gap closed and was later downgraded to the medical floor. HbA1c was 12.4, indicating medication non-compliance. Nausea/vomiting/ diarrhea resolved, likely due to uncontrolled diabetes with underlying DKA.   Pt continued on gabapentin due to neuropathy.   Pt continued on bronchodilators for asthma.   Pt continued on protonix for GI prophylaxis. 33 yo female from home lives with 2 children. PMH Type 1 DM (for the past 14 years), asthma, HLD, IBS, gastritis, depression, s/p , tubal ligation, cholecystectomy, acne (on abx) and bilateral cataracts. Pt was  BIBA for 3 days of vomiting, diarrhea, abdominal pain.  Patient is somnolent but able to provide some history; additional medical history obtained from her PMD, Dr Salavtore Louis, [phone].  Endorses 3 days of chills, NBNB vomiting, nonbloody diarrhea and generalized abdominal pain x 3 days.  Patient usually takes long acting insulin Tresiba 90U daily and sliding scale Humalog but has not taken Tresiba x past 2 days, states took 25U short acting insulin one day prior to admission.  Patient saw PMD day prior to admission, who told her to come to ED.  Currently, also endorses chest pain and SOB. Patient was admitted due to DKA found on admission with anion gap of 23    Pt was started on insulin drip at 4 cc/hr and transferred to ICU. Patient's gap closed and was later downgraded to the medical floor. HbA1c was 12.4, indicating medication non-compliance. Nausea/vomiting/ diarrhea resolved, likely due to uncontrolled diabetes with underlying DKA.   Pt continued on gabapentin due to neuropathy.   Pt continued on bronchodilators for asthma.   Pt continued on protonix for GI prophylaxis.      Pt to be discharged on Tresiba 25 units at bedtime with humalog 4 units 3 times daily with meals. Discussed with patient in detail.     Pt is stable for discharge to shelter. Patient to continue to monitor fingersticks and follow-up with primary care doctor in 1-2 weeks for further insulin adjustment as necessary. Patient to consider resuming insulin pump for increased medication compliance. Discussed above with attending. The patient is a 89y Female complaining of shortness of breath.

## 2022-06-20 NOTE — ED PROVIDER NOTE - CCCP TRG CHIEF CMPLNT
4+ out of 5 EHL B/L LE, normal sensation to light touch, no pain to calf squeeze. hyperglycemic  417/vomiting

## 2023-01-31 NOTE — ED PROVIDER NOTE - DIAGNOSIS COUNSELING, MDM
· Continue atorvastatin conducted a detailed discussion... I had a detailed discussion with the patient and/or guardian regarding the historical points, exam findings, and any diagnostic results supporting the discharge/admit diagnosis.

## 2023-06-12 ENCOUNTER — EMERGENCY (EMERGENCY)
Facility: HOSPITAL | Age: 38
LOS: 1 days | Discharge: HOME | End: 2023-06-12
Attending: EMERGENCY MEDICINE
Payer: MEDICAID

## 2023-06-12 VITALS
TEMPERATURE: 98 F | OXYGEN SATURATION: 100 % | HEART RATE: 100 BPM | DIASTOLIC BLOOD PRESSURE: 97 MMHG | RESPIRATION RATE: 18 BRPM | HEIGHT: 59 IN | SYSTOLIC BLOOD PRESSURE: 144 MMHG | WEIGHT: 99.21 LBS

## 2023-06-12 DIAGNOSIS — Z90.49 ACQUIRED ABSENCE OF OTHER SPECIFIED PARTS OF DIGESTIVE TRACT: Chronic | ICD-10-CM

## 2023-06-12 DIAGNOSIS — Z98.49 CATARACT EXTRACTION STATUS, UNSPECIFIED EYE: Chronic | ICD-10-CM

## 2023-06-12 DIAGNOSIS — Z98.51 TUBAL LIGATION STATUS: Chronic | ICD-10-CM

## 2023-06-12 DIAGNOSIS — Z98.89 OTHER SPECIFIED POSTPROCEDURAL STATES: Chronic | ICD-10-CM

## 2023-06-12 PROCEDURE — 99284 EMERGENCY DEPT VISIT MOD MDM: CPT

## 2023-06-12 NOTE — ED PROVIDER NOTE - CLINICAL SUMMARY MEDICAL DECISION MAKING FREE TEXT BOX
CT reported Thick-walled urinary bladder, compatible cystitis. Questionable presence of bilateral striated nephrograms which may   indicate ascending urinary tract infection and pyelonephritis.  Question mild increased enhancement of the gastric mucosa, which may   represent gastritis.      7:55 AM patient feels better no guarding to full palpation of abdomen patient able to drink fluids without vomiting.  Patient describes having angioedema like reaction to amoxicillin.  I will therefore avoid providing patient with beta-lactam antibiotics.  Urine culture sent.  I will Levaquin for possible pyelonephritis as per radiology report.    Pt is well appearing, has no new complaints and able to walk with normal gait. Pt is stable for discharge and follow up with medical doctor. Pt educated on care and need for follow up. Discussed anticipatory guidance and return precautions. Questions answered. I had a detailed discussion with the patient regarding the historical points, exam findings, and any diagnostic results supporting the discharge diagnosis.

## 2023-06-12 NOTE — ED ADULT NURSE NOTE - NS ED NURSE LEVEL OF CONSCIOUSNESS MENTAL STATUS
"Subjective:      Tony Han is a 71 y.o. male who returns today regarding his recurrent UTI symptoms.     He was initially seen in late 2016 for several months of near constant UTI symptoms with multiple courses of abx.      He had workup with primarily finding of left renal stone. He is now s/p left ESWL on 3/23/17.     He was last treated for UTI by ER on 4/9/17 (cipro x 14 days). He opted for prophylactic abx at that time and started daily Keflex, which he will complete in a few days.    He remains on flomax and finasteride for BPH. Is bothered by sexual side effects.    He continues to have LUTS with AUASS 17/3.    Today he reports that he is doing much better. No UTIs in last 3 months.     The following portions of the patient's history were reviewed and updated as appropriate: allergies, current medications, past family history, past medical history, past social history, past surgical history and problem list.    Review of Systems  A comprehensive multipoint review of systems was negative except as otherwise stated in the HPI.     Objective:   Vitals: /72 (BP Location: Right arm, Patient Position: Sitting, BP Method: Large (Automatic))   Pulse 91   Ht 5' 9" (1.753 m)   Wt 90.4 kg (199 lb 3 oz)   BMI 29.41 kg/m²     Physical Exam   General: alert and oriented, no acute distress  Respiratory: Symmetric expansion, non-labored breathing  Neuro: no gross deficits  Psych: normal judgment and insight, normal mood/affect and non-anxious    Bladder Scan PVR: 103cc      Lab Review   Lab Results   Component Value Date    WBC 7.89 07/14/2017    HGB 13.8 (L) 07/14/2017    HCT 40.2 07/14/2017    MCV 96 07/14/2017     07/14/2017     Lab Results   Component Value Date    CREATININE 0.9 07/14/2017    BUN 15 07/14/2017     Lab Results   Component Value Date    PSA 3.2 03/15/2017    PSADIAG 0.60 07/14/2017     Imaging   None today     Assessment and Plan:   Recurrent UTI  BPH (benign prostatic hypertrophy) with " urinary obstruction  -- Will complete 6 mos daily keflex this week  -- Again explained that if UTIs recur, we should further address his BPH and HARLAN, as per below.  -- Discussed alternatives to meds for his BPH, including Urolift. He is again interested but wishes to consider further. Will need repeat cysto prior to scheduling.    Nephrolithiasis  -- S/p ESWL  -- Stone still present on KUB and CT, though appearance c/w residual debris  -- Consider further treatment only if UTIs recur (likely will address BPH first)    FU 3 mos   Awake/Alert/Cooperative

## 2023-06-12 NOTE — ED PROVIDER NOTE - PATIENT PORTAL LINK FT
You can access the FollowMyHealth Patient Portal offered by [facility] by registering at the following website: [email or web address]. By joining [employer]’s FollowMyHealth portal, you will also be able to view your health information using other applications (apps) compatible with our system.

## 2023-06-12 NOTE — ED ADULT NURSE NOTE - OBJECTIVE STATEMENT
patient reports mid abd. pain, dizziness, N&V x 4hrs prior to arrival. pain scale 6/10 medication given as  ordered. bld.drawn and sent to labs

## 2023-06-12 NOTE — ED PROVIDER NOTE - RAPID ASSESSMENT
Attending: Dr. Saleh- Patient was rapidly assessed via telemedicine encounter; a limited history and physical exam was performed. The patient will be seen and further worked up in the main emergency department and their care will be completed by the main emergency department team. Receiving team will follow up on labs, analgesia, any clinical imaging, and perform reassessment and disposition of the patient as clinically indicated. All decisions regarding the progression of care will be made at their discretion. patient presents for back pain that is causing diarrhea and vomiting. Pain began on and off but now getting worse. Nausea started today with associated vomiting. vomited approx 5 times in the last hour described as yellow (bilious) and phlegm.  no blood in vomiting or diarrhea.

## 2023-06-12 NOTE — ED PROVIDER NOTE - NS ED ROS FT
Pt points to RLQ as location of tenderness.   No icterus, no jaundice, no guarding to full palpation of abdomen.

## 2023-06-12 NOTE — ED PROVIDER NOTE - NSFOLLOWUPCLINICS_GEN_ALL_ED_FT
[facility]  Internal Medicine  [address] [place] [address]  Phone: [phone]  Fax: [phone]

## 2023-06-12 NOTE — ED ADULT NURSE NOTE - ED STAT RN HANDOFF DETAILS
pt discharged to home as per MD order. IV access removed, no redness, bleeding, or swelling noted at site. All discharge instructions and follow-up visits provided to pt, pt verbalized understanding, leaving ambulatory in no acute distress

## 2023-06-12 NOTE — ED ADULT NURSE NOTE - NSFALLUNIVINTERV_ED_ALL_ED
Bed/Stretcher in lowest position, wheels locked, appropriate side rails in place/Call bell, personal items and telephone in reach/Instruct patient to call for assistance before getting out of bed/chair/stretcher/Non-slip footwear applied when patient is off stretcher/[place] to call system/Physically safe environment - no spills, clutter or unnecessary equipment/Purposeful proactive rounding/Room/bathroom lighting operational, light cord in reach

## 2023-06-12 NOTE — ED PROVIDER NOTE - PHYSICAL EXAMINATION
Pt points to right lower abdominal area as location of tenderness, no guarding, no rebound, no rigidity.

## 2023-06-12 NOTE — ED PROVIDER NOTE - NSFOLLOWUPINSTRUCTIONS_ED_ALL_ED_FT
Pyelonephritis, Adult  Body outline showing the urinary system, with a close-up of a normal kidney and an infected kidney.  Pyelonephritis is an infection that occurs in the kidney. The kidneys are the organs that filter a person's blood and move waste out of the bloodstream and into the urine. Urine passes from the kidneys, through tubes called ureters, and into the bladder. There are two main types of pyelonephritis:  Infections that come on quickly without any warning (acute pyelonephritis).  Infections that last for a long period of time (chronic pyelonephritis).  In most cases, the infection clears up with treatment and does not cause further problems. More severe infections or chronic infections can sometimes spread to the bloodstream or lead to other problems with the kidneys.    What are the causes?  This condition is usually caused by:  Bacteria traveling from the bladder up to the kidney. This may occur after having a bladder infection (cystitis) or urinary tract infection (UTI).  Bladder infections caused from bacteria traveling from the bloodstream to the kidney.  What increases the risk?  This condition is more likely to develop in:  Pregnant women.  Older people.  People who have any of these conditions:  Diabetes.  Inflammation of the prostate gland (prostatitis), in males.  Kidney stones or bladder stones.  Other abnormalities of the kidney or ureter.  Cancer.  People who have a catheter placed in the bladder.  People who are sexually active.  Women who use spermicides.  People who have had a prior UTI.  What are the signs or symptoms?  Symptoms of this condition include:  Frequent urination.  Strong or persistent urge to urinate.  Burning or stinging when urinating.  Abdominal pain.  Back pain.  Pain in the side or flank area.  Fever or chills.  Blood in the urine, or dark urine.  Nausea or vomiting.  How is this diagnosed?  This condition may be diagnosed based on:  Your medical history and a physical exam.  Urine tests.  Blood tests.  You may also have imaging tests of the kidneys, such as an ultrasound or CT scan.    How is this treated?  Treatment for this condition may depend on the severity of the infection.  If the infection is mild and is found early, you may be treated with antibiotic medicines taken by mouth (orally). You will need to drink fluids to remain hydrated.  If the infection is more severe, you may need to stay in the hospital and receive antibiotics given directly into a vein through an IV. You may also need to receive fluids through an IV if you are not able to remain hydrated. After your hospital stay, you may need to take oral antibiotics for a period of time.  Other treatments may be required, depending on the cause of the infection.    Follow these instructions at home:  Medicines    Take your antibiotic medicine as told by your health care provider. Do not stop taking the antibiotic even if you start to feel better.  Take over-the-counter and prescription medicines only as told by your health care provider.  General instructions    Three cups showing dark yellow, yellow, and pale yellow urine.  Drink enough fluid to keep your urine pale yellow.  Avoid caffeine, tea, and carbonated beverages. They tend to irritate the bladder.  Urinate often. Avoid holding in urine for long periods of time.  Urinate before and after sex.  After a bowel movement, women should cleanse from front to back. Use each tissue only once.  Keep all follow-up visits as told by your health care provider. This is important.  Contact a health care provider if:  Your symptoms do not get better after 2 days of treatment.  Your symptoms get worse.  You have a fever.  Get help right away if you:  Are unable to take your antibiotics or fluids.  Have shaking chills.  Vomit.  Have severe flank or back pain.  Have extreme weakness or fainting.  Summary  Pyelonephritis is a urinary tract infection (UTI) that occurs in the kidney.  Treatment for this condition may depend on the severity of the infection.  Take your antibiotic medicine as told by your health care provider. Do not stop taking the antibiotic even if you start to feel better.  Drink enough fluid to keep your urine pale yellow.  Keep all follow-up visits as told by your health care provider. This is important.  This information is not intended to replace advice given to you by your health care provider. Make sure you discuss any questions you have with your health care provider.

## 2023-06-12 NOTE — ED PROVIDER NOTE - OBJECTIVE STATEMENT
88-year-old female chief complaint of nausea and vomiting and right-sided abdominal discomfort since yesterday afternoon.  No reported fever, no chest pain, no shortness of breath.  Patient has past surgical history for cholecystectomy.  Pt is diabetic has glucose sensor and takes Afrezza inhaled insulin if glucose > 250. 38-year-old female chief complaint of nausea and vomiting and right-sided abdominal discomfort since yesterday afternoon.  No reported fever, no chest pain, no shortness of breath.  Patient has past surgical history for cholecystectomy.  Pt is diabetic has glucose sensor and takes Afrezza inhaled insulin if glucose > 250.

## 2023-06-12 NOTE — ED PROVIDER NOTE - NSICDXPASTSURGICALHX_GEN_ALL_CORE_FT
PAST SURGICAL HISTORY:  H/O colonoscopy     H/O tubal ligation     History of      S/P cataract surgery     S/P cholecystectomy     S/P endoscopy

## 2023-06-13 VITALS
RESPIRATION RATE: 17 BRPM | DIASTOLIC BLOOD PRESSURE: 89 MMHG | SYSTOLIC BLOOD PRESSURE: 126 MMHG | OXYGEN SATURATION: 97 % | TEMPERATURE: 98 F | HEART RATE: 99 BPM

## 2023-06-13 LAB
ALBUMIN SERPL ELPH-MCNC: 3.7 G/DL — SIGNIFICANT CHANGE UP (ref 3.5–5)
ALP SERPL-CCNC: 95 U/L — SIGNIFICANT CHANGE UP (ref 40–120)
ALT FLD-CCNC: 26 U/L DA — SIGNIFICANT CHANGE UP (ref 10–60)
ANION GAP SERPL CALC-SCNC: 6 MMOL/L — SIGNIFICANT CHANGE UP (ref 5–17)
APPEARANCE UR: CLEAR — SIGNIFICANT CHANGE UP
AST SERPL-CCNC: 16 U/L — SIGNIFICANT CHANGE UP (ref 10–40)
BACTERIA # UR AUTO: ABNORMAL /HPF
BASOPHILS # BLD AUTO: 0.03 K/UL — SIGNIFICANT CHANGE UP (ref 0–0.2)
BASOPHILS NFR BLD AUTO: 0.2 % — SIGNIFICANT CHANGE UP (ref 0–2)
BILIRUB SERPL-MCNC: 0.8 MG/DL — SIGNIFICANT CHANGE UP (ref 0.2–1.2)
BILIRUB UR-MCNC: NEGATIVE — SIGNIFICANT CHANGE UP
BUN SERPL-MCNC: 31 MG/DL — HIGH (ref 7–18)
CALCIUM SERPL-MCNC: 9.2 MG/DL — SIGNIFICANT CHANGE UP (ref 8.4–10.5)
CHLORIDE SERPL-SCNC: 106 MMOL/L — SIGNIFICANT CHANGE UP (ref 96–108)
CO2 SERPL-SCNC: 27 MMOL/L — SIGNIFICANT CHANGE UP (ref 22–31)
COLOR SPEC: YELLOW — SIGNIFICANT CHANGE UP
COMMENT - URINE: SIGNIFICANT CHANGE UP
CREAT SERPL-MCNC: 1.36 MG/DL — HIGH (ref 0.5–1.3)
DIFF PNL FLD: ABNORMAL
EGFR: 52 ML/MIN/1.73M2 — LOW
EOSINOPHIL # BLD AUTO: 0.01 K/UL — SIGNIFICANT CHANGE UP (ref 0–0.5)
EOSINOPHIL NFR BLD AUTO: 0.1 % — SIGNIFICANT CHANGE UP (ref 0–6)
EPI CELLS # UR: SIGNIFICANT CHANGE UP /HPF
GLUCOSE SERPL-MCNC: 136 MG/DL — HIGH (ref 70–99)
GLUCOSE UR QL: NEGATIVE — SIGNIFICANT CHANGE UP
HCG SERPL-ACNC: <1 MIU/ML — SIGNIFICANT CHANGE UP
HCT VFR BLD CALC: 34.4 % — LOW (ref 34.5–45)
HGB BLD-MCNC: 11.7 G/DL — SIGNIFICANT CHANGE UP (ref 11.5–15.5)
IMM GRANULOCYTES NFR BLD AUTO: 0.6 % — SIGNIFICANT CHANGE UP (ref 0–0.9)
KETONES UR-MCNC: NEGATIVE — SIGNIFICANT CHANGE UP
LEUKOCYTE ESTERASE UR-ACNC: ABNORMAL
LIDOCAIN IGE QN: 37 U/L — LOW (ref 73–393)
LYMPHOCYTES # BLD AUTO: 1 K/UL — SIGNIFICANT CHANGE UP (ref 1–3.3)
LYMPHOCYTES # BLD AUTO: 5.7 % — LOW (ref 13–44)
MCHC RBC-ENTMCNC: 29.8 PG — SIGNIFICANT CHANGE UP (ref 27–34)
MCHC RBC-ENTMCNC: 34 GM/DL — SIGNIFICANT CHANGE UP (ref 32–36)
MCV RBC AUTO: 87.5 FL — SIGNIFICANT CHANGE UP (ref 80–100)
MONOCYTES # BLD AUTO: 0.73 K/UL — SIGNIFICANT CHANGE UP (ref 0–0.9)
MONOCYTES NFR BLD AUTO: 4.2 % — SIGNIFICANT CHANGE UP (ref 2–14)
NEUTROPHILS # BLD AUTO: 15.72 K/UL — HIGH (ref 1.8–7.4)
NEUTROPHILS NFR BLD AUTO: 89.2 % — HIGH (ref 43–77)
NITRITE UR-MCNC: NEGATIVE — SIGNIFICANT CHANGE UP
NRBC # BLD: 0 /100 WBCS — SIGNIFICANT CHANGE UP (ref 0–0)
PH UR: 7 — SIGNIFICANT CHANGE UP (ref 5–8)
PLATELET # BLD AUTO: 327 K/UL — SIGNIFICANT CHANGE UP (ref 150–400)
POTASSIUM SERPL-MCNC: 3.7 MMOL/L — SIGNIFICANT CHANGE UP (ref 3.5–5.3)
POTASSIUM SERPL-SCNC: 3.7 MMOL/L — SIGNIFICANT CHANGE UP (ref 3.5–5.3)
PROT SERPL-MCNC: 7.7 G/DL — SIGNIFICANT CHANGE UP (ref 6–8.3)
PROT UR-MCNC: 100 MG/DL
RBC # BLD: 3.93 M/UL — SIGNIFICANT CHANGE UP (ref 3.8–5.2)
RBC # FLD: 12.3 % — SIGNIFICANT CHANGE UP (ref 10.3–14.5)
RBC CASTS # UR COMP ASSIST: ABNORMAL /HPF (ref 0–2)
SODIUM SERPL-SCNC: 139 MMOL/L — SIGNIFICANT CHANGE UP (ref 135–145)
SP GR SPEC: 1 — LOW (ref 1.01–1.02)
UROBILINOGEN FLD QL: NEGATIVE — SIGNIFICANT CHANGE UP
WBC # BLD: 17.59 K/UL — HIGH (ref 3.8–10.5)
WBC # FLD AUTO: 17.59 K/UL — HIGH (ref 3.8–10.5)
WBC UR QL: ABNORMAL /HPF (ref 0–5)

## 2023-06-13 PROCEDURE — 99284 EMERGENCY DEPT VISIT MOD MDM: CPT | Mod: 25

## 2023-06-13 PROCEDURE — 96366 THER/PROPH/DIAG IV INF ADDON: CPT

## 2023-06-13 PROCEDURE — 84702 CHORIONIC GONADOTROPIN TEST: CPT

## 2023-06-13 PROCEDURE — 83690 ASSAY OF LIPASE: CPT

## 2023-06-13 PROCEDURE — 80053 COMPREHEN METABOLIC PANEL: CPT

## 2023-06-13 PROCEDURE — 81001 URINALYSIS AUTO W/SCOPE: CPT

## 2023-06-13 PROCEDURE — 96375 TX/PRO/DX INJ NEW DRUG ADDON: CPT

## 2023-06-13 PROCEDURE — 87086 URINE CULTURE/COLONY COUNT: CPT

## 2023-06-13 PROCEDURE — 96365 THER/PROPH/DIAG IV INF INIT: CPT

## 2023-06-13 PROCEDURE — 74177 CT ABD & PELVIS W/CONTRAST: CPT | Mod: 26,MA

## 2023-06-13 PROCEDURE — 85025 COMPLETE CBC W/AUTO DIFF WBC: CPT

## 2023-06-13 PROCEDURE — 36415 COLL VENOUS BLD VENIPUNCTURE: CPT

## 2023-06-13 PROCEDURE — 74177 CT ABD & PELVIS W/CONTRAST: CPT | Mod: MA

## 2023-06-13 RX ORDER — ONDANSETRON 8 MG/1
4 TABLET, FILM COATED ORAL ONCE
Refills: 0 | Status: COMPLETED | OUTPATIENT
Start: 2023-06-13 | End: 2023-06-13

## 2023-06-13 RX ORDER — FAMOTIDINE 10 MG/ML
1 INJECTION INTRAVENOUS
Qty: 20 | Refills: 0
Start: 2023-06-13

## 2023-06-13 RX ORDER — GENTAMICIN SULFATE 40 MG/ML
76 VIAL (ML) INJECTION ONCE
Refills: 0 | Status: COMPLETED | OUTPATIENT
Start: 2023-06-13 | End: 2023-06-13

## 2023-06-13 RX ORDER — KETOROLAC TROMETHAMINE 30 MG/ML
30 SYRINGE (ML) INJECTION ONCE
Refills: 0 | Status: DISCONTINUED | OUTPATIENT
Start: 2023-06-13 | End: 2023-06-13

## 2023-06-13 RX ORDER — LEVOFLOXACIN 5 MG/ML
1 INJECTION, SOLUTION INTRAVENOUS
Qty: 5 | Refills: 0
Start: 2023-06-13 | End: 2023-06-17

## 2023-06-13 RX ADMIN — ONDANSETRON 4 MILLIGRAM(S): 8 TABLET, FILM COATED ORAL at 03:38

## 2023-06-13 RX ADMIN — Medication 30 MILLIGRAM(S): at 03:38

## 2023-06-13 RX ADMIN — Medication 30 MILLIGRAM(S): at 03:59

## 2023-06-13 RX ADMIN — Medication 76 MILLIGRAM(S): at 07:19

## 2023-06-13 RX ADMIN — Medication 203.8 MILLIGRAM(S): at 04:48

## 2023-06-15 LAB
CULTURE RESULTS: SIGNIFICANT CHANGE UP
SPECIMEN SOURCE: SIGNIFICANT CHANGE UP

## 2024-03-06 NOTE — DIETITIAN INITIAL EVALUATION ADULT. - ETIOLOGY
Dear Rosemarie Michel,    We are sorry you are not feeling well. Based on the responses you provided, it is recommended that you be seen in-person in urgent care so we can better evaluate your symptoms. Please click here to find the nearest urgent care location to you.   You will not be charged for this Visit. Thank you for trusting us with your care.    Greg Contreras MD    
DKA with type 1 diabetes & multiple comorbidities

## 2024-07-23 NOTE — PROGRESS NOTE ADULT - PROBLEM SELECTOR PLAN 7
Detail Level: Detailed
Quality 226: Preventive Care And Screening: Tobacco Use: Screening And Cessation Intervention: Patient screened for tobacco use and is an ex/non-smoker
Improve VTE score: 1 (No need of chem ppx)  [ ] Previous VTE                                               3  [ ] Thrombophilia                                             2  [ ] Lower limb paralysis                                   2    [ ] Current Cancer                                           2   [x] Immobilization > 24 hrs                              1  [ ] ICU/CCU stay > 24 hours                            1  [ ] Age > 60                                                       1
Quality 130: Documentation Of Current Medications In The Medical Record: Current Medications Documented
Improve VTE score: 1 (No need of chem ppx)  [ ] Previous VTE                                               3  [ ] Thrombophilia                                             2  [ ] Lower limb paralysis                                   2    [ ] Current Cancer                                           2   [x] Immobilization > 24 hrs                              1  [ ] ICU/CCU stay > 24 hours                            1  [ ] Age > 60                                                       1

## 2024-08-22 NOTE — ED ADULT TRIAGE NOTE - BP NONINVASIVE SYSTOLIC (MM HG)
Anesthesia Pre Eval Note    Anesthesia ROS/Med Hx        Anesthetic Complication History:    Patient does not have a history of anesthetic complications      Pulmonary Review:  Patient does not have a pulmonary history      Neuro/Psych Review:  Patient does not have a neuro/psych history         Cardiovascular Review:  Patient does not have a cardiovascular history       GI/HEPATIC/RENAL Review:  Patient does not have a GI/hepatic/renalhistory       End/Other Review:    Positive for cancer  Additional Results:      ALLERGIES:   -- Shellfish Allergy   (Food Or Med) -- Other (See Comments)    --  Skin reaction/eye itching   Last Labs        Component                Value               Date/Time                  WBC                      6.6                 08/13/2024 0942            RBC                      4.78                08/13/2024 0942            HGB                      14.5                08/13/2024 0942            HCT                      43.9                08/13/2024 0942            MCV                      91.8                08/13/2024 0942            MCH                      30.3                08/13/2024 0942            MCHC                     33.0                08/13/2024 0942            RDW-CV                   14.0                08/13/2024 0942            Sodium                   136                 08/13/2024 0942            Potassium                4.3                 08/13/2024 0942            Chloride                 109                 08/13/2024 0942            Carbon Dioxide           24                  08/13/2024 0942            Glucose                  106 (H)             08/13/2024 0942            BUN                      17                  08/13/2024 0942            Creatinine               0.86                08/13/2024 0942            Glomerular Filtrati*     81                  08/13/2024 0942            Calcium                  9.3                 08/13/2024 0942            PLT                       274                 08/13/2024 0942        Past Medical History:  2024: Cervical cyst  No date: Liposarcoma  (CMD)  No date: Postmenopausal bleeding  No date: Uterine fibroid  Past Surgical History:  No date: Hysteroscopy endometrial ablation  No date: Tubal ligation   Prior to Admission medications :  Not on File       Patient Vitals for the past 24 hrs:   BP Temp Temp src Pulse Resp SpO2 Height Weight   08/22/24 1254 131/82 36.8 °C (98.2 °F) Temporal 63 16 98 % 5' 7\" (1.702 m) 93.1 kg (205 lb 4 oz)       Social history reviewed:  Social History     Tobacco Use   Smoking Status Never   Smokeless Tobacco Never        E-Cigarette/Vaping Substances & Devices    E-Cigarette/Vaping Use Never Used        Social History     Substance and Sexual Activity   Alcohol Use Yes    Comment: occasionally           Relevant Problems   No relevant active problems       Physical Exam     Airway   Mallampati: II  TM Distance: >3 FB  Neck ROM: Full  Neck: Non-tender and Able to place in sniff position  TMJ Mobility: Good    Cardiovascular  Cardiovascular exam normal  Cardio Rhythm: Regular  Cardio Rate: Normal    Head Assessment  Head assessment: Normocephalic and Atraumatic    General Assessment  General Assessment: Alert and oriented and No acute distress    Dental Exam  Dental exam normal    Pulmonary Exam  Pulmonary exam normal  Breath sounds clear to auscultation:  Yes    Abdominal Exam  Abdominal exam normal      Anesthesia Plan:    ASA Status: 2  Anesthesia Type: General    Induction: Intravenous  Preferred Airway Type: LMA  Maintenance: Inhalational  Premedication: IV      Post-op Pain Management: Per Surgeon      Checklist  Reviewed: NPO Status, Allergies, Medications, Problem list and Past Med History  Consent/Risks Discussed Statement:  The proposed anesthetic plan, including its risks and benefits, have been discussed with the Patient along with the risks and benefits of alternatives. Questions were  encouraged and answered and the patient and/or representative understands and agrees to proceed.        I discussed with the patient (and/or patient's legal representative) the risks and benefits of the proposed anesthesia plan, General, which may include services performed by other anesthesia providers.    Alternative anesthesia plans, if available, were reviewed with the patient (and/or patient's legal representative). Discussion has been held with the patient (and/or patient's legal representative) regarding risks of anesthesia, which include vomiting, nausea and dental injury and emergent situations that may require change in anesthesia plan.    The patient (and/or patient's legal representative) has indicated understanding, his/her questions have been answered, and he/she wishes to proceed with the planned anesthetic.    Blood Products: Not Anticipated     130

## 2024-10-01 NOTE — PATIENT PROFILE ADULT - LIVES WITH
Medication:     atorvastatin (LIPITOR) 40 MG tablet     Last office visit date: 09/11/2024  Medication Refill Protocol Failed.  Medication Refill Protocol Failed. Courtesy Refill provided after chart review per protocol guidelines. Writer confirmed, courtesy refill was not a duplicate.       Hmg CoA Reductase Inhibitors (Statin) Refill Protocol - 12 Month Protocol [id number]/01/2024 09:42 AM   Protocol Details Lipid Panel or Direct LDL resulted within last 15 months -- IF CRITERIA FAILED REFER TO PROTOCOL DETAILS        Upcoming appt 09/10/2025  
Shelter

## 2025-02-14 NOTE — DISCHARGE NOTE PROVIDER - NSDCHC_MEDRECSTATUS_GEN_ALL_CORE
Admission Reconciliation is Completed  Discharge Reconciliation is Not Complete Admission Reconciliation is Completed  Discharge Reconciliation is Completed None

## 2025-04-24 NOTE — ED PROVIDER NOTE - CHPI ED SYMPTOMS NEG
Session ID: [id number]  Language: Anastasiia Cuenca   ID: #[id number]   Name: Carl no fever/no burning urination/no dysuria